# Patient Record
Sex: MALE | Race: WHITE | Employment: OTHER | URBAN - NONMETROPOLITAN AREA
[De-identification: names, ages, dates, MRNs, and addresses within clinical notes are randomized per-mention and may not be internally consistent; named-entity substitution may affect disease eponyms.]

---

## 2019-07-01 RX ORDER — MELOXICAM 15 MG/1
15 TABLET ORAL DAILY
Status: ON HOLD | COMMUNITY
End: 2019-07-11 | Stop reason: HOSPADM

## 2019-07-01 RX ORDER — TIZANIDINE 2 MG/1
2 TABLET ORAL 3 TIMES DAILY
COMMUNITY
End: 2020-01-22 | Stop reason: ALTCHOICE

## 2019-07-01 RX ORDER — PRAVASTATIN SODIUM 10 MG
10 TABLET ORAL NIGHTLY
COMMUNITY

## 2019-07-01 RX ORDER — GABAPENTIN 300 MG/1
300 CAPSULE ORAL 3 TIMES DAILY
COMMUNITY
End: 2020-01-22 | Stop reason: ALTCHOICE

## 2019-07-01 SDOH — HEALTH STABILITY: MENTAL HEALTH: HOW OFTEN DO YOU HAVE A DRINK CONTAINING ALCOHOL?: NEVER

## 2019-07-09 ENCOUNTER — APPOINTMENT (OUTPATIENT)
Dept: GENERAL RADIOLOGY | Age: 69
DRG: 467 | End: 2019-07-09
Attending: ORTHOPAEDIC SURGERY
Payer: MEDICARE

## 2019-07-09 ENCOUNTER — HOSPITAL ENCOUNTER (INPATIENT)
Age: 69
LOS: 3 days | Discharge: NURSING, REHAB OR LONG-TERM CARE | DRG: 467 | End: 2019-07-12
Attending: ORTHOPAEDIC SURGERY | Admitting: ORTHOPAEDIC SURGERY
Payer: MEDICARE

## 2019-07-09 ENCOUNTER — ANESTHESIA (OUTPATIENT)
Dept: OPERATING ROOM | Age: 69
DRG: 467 | End: 2019-07-09
Payer: MEDICARE

## 2019-07-09 ENCOUNTER — ANESTHESIA EVENT (OUTPATIENT)
Dept: OPERATING ROOM | Age: 69
DRG: 467 | End: 2019-07-09
Payer: MEDICARE

## 2019-07-09 VITALS — SYSTOLIC BLOOD PRESSURE: 103 MMHG | DIASTOLIC BLOOD PRESSURE: 58 MMHG | TEMPERATURE: 97.7 F | OXYGEN SATURATION: 94 %

## 2019-07-09 DIAGNOSIS — T84.7XXA INFECTED ORTHOPEDIC IMPLANT, INITIAL ENCOUNTER (HCC): Primary | ICD-10-CM

## 2019-07-09 LAB
ABO: NORMAL
ANTIBODY SCREEN: NORMAL
GLUCOSE BLD-MCNC: 148 MG/DL (ref 70–108)
GLUCOSE BLD-MCNC: 151 MG/DL (ref 70–108)
GLUCOSE BLD-MCNC: 205 MG/DL (ref 70–108)
RH FACTOR: NORMAL

## 2019-07-09 PROCEDURE — 6360000002 HC RX W HCPCS: Performed by: PHYSICIAN ASSISTANT

## 2019-07-09 PROCEDURE — 2580000003 HC RX 258: Performed by: PHYSICIAN ASSISTANT

## 2019-07-09 PROCEDURE — 89060 EXAM SYNOVIAL FLUID CRYSTALS: CPT

## 2019-07-09 PROCEDURE — 3E1U38Z IRRIGATION OF JOINTS USING IRRIGATING SUBSTANCE, PERCUTANEOUS APPROACH: ICD-10-PCS | Performed by: ORTHOPAEDIC SURGERY

## 2019-07-09 PROCEDURE — 82948 REAGENT STRIP/BLOOD GLUCOSE: CPT

## 2019-07-09 PROCEDURE — 86900 BLOOD TYPING SEROLOGIC ABO: CPT

## 2019-07-09 PROCEDURE — 6370000000 HC RX 637 (ALT 250 FOR IP): Performed by: PHYSICIAN ASSISTANT

## 2019-07-09 PROCEDURE — L1851 KO SINGLE UPRIGHT PREFAB OTS: HCPCS | Performed by: ORTHOPAEDIC SURGERY

## 2019-07-09 PROCEDURE — 87158 CULTURE TYPING ADDED METHOD: CPT

## 2019-07-09 PROCEDURE — 2709999900 HC NON-CHARGEABLE SUPPLY: Performed by: ORTHOPAEDIC SURGERY

## 2019-07-09 PROCEDURE — 6360000002 HC RX W HCPCS: Performed by: NURSE ANESTHETIST, CERTIFIED REGISTERED

## 2019-07-09 PROCEDURE — 3700000001 HC ADD 15 MINUTES (ANESTHESIA): Performed by: ORTHOPAEDIC SURGERY

## 2019-07-09 PROCEDURE — 87116 MYCOBACTERIA CULTURE: CPT

## 2019-07-09 PROCEDURE — 3600000015 HC SURGERY LEVEL 5 ADDTL 15MIN: Performed by: ORTHOPAEDIC SURGERY

## 2019-07-09 PROCEDURE — 0SPD0JZ REMOVAL OF SYNTHETIC SUBSTITUTE FROM LEFT KNEE JOINT, OPEN APPROACH: ICD-10-PCS | Performed by: ORTHOPAEDIC SURGERY

## 2019-07-09 PROCEDURE — 0SRD0EZ REPLACEMENT OF LEFT KNEE JOINT WITH ARTICULATING SPACER, OPEN APPROACH: ICD-10-PCS | Performed by: ORTHOPAEDIC SURGERY

## 2019-07-09 PROCEDURE — 86901 BLOOD TYPING SEROLOGIC RH(D): CPT

## 2019-07-09 PROCEDURE — 2720000010 HC SURG SUPPLY STERILE: Performed by: ORTHOPAEDIC SURGERY

## 2019-07-09 PROCEDURE — 88112 CYTOPATH CELL ENHANCE TECH: CPT

## 2019-07-09 PROCEDURE — 3600000005 HC SURGERY LEVEL 5 BASE: Performed by: ORTHOPAEDIC SURGERY

## 2019-07-09 PROCEDURE — 7100000000 HC PACU RECOVERY - FIRST 15 MIN: Performed by: ORTHOPAEDIC SURGERY

## 2019-07-09 PROCEDURE — 3700000000 HC ANESTHESIA ATTENDED CARE: Performed by: ORTHOPAEDIC SURGERY

## 2019-07-09 PROCEDURE — 6360000002 HC RX W HCPCS: Performed by: ORTHOPAEDIC SURGERY

## 2019-07-09 PROCEDURE — 87205 SMEAR GRAM STAIN: CPT

## 2019-07-09 PROCEDURE — 6360000002 HC RX W HCPCS: Performed by: INTERNAL MEDICINE

## 2019-07-09 PROCEDURE — C1713 ANCHOR/SCREW BN/BN,TIS/BN: HCPCS | Performed by: ORTHOPAEDIC SURGERY

## 2019-07-09 PROCEDURE — 87070 CULTURE OTHR SPECIMN AEROBIC: CPT

## 2019-07-09 PROCEDURE — 6370000000 HC RX 637 (ALT 250 FOR IP): Performed by: ORTHOPAEDIC SURGERY

## 2019-07-09 PROCEDURE — L1810 KO ELASTIC WITH JOINTS: HCPCS | Performed by: ORTHOPAEDIC SURGERY

## 2019-07-09 PROCEDURE — 2500000003 HC RX 250 WO HCPCS: Performed by: ORTHOPAEDIC SURGERY

## 2019-07-09 PROCEDURE — 88305 TISSUE EXAM BY PATHOLOGIST: CPT

## 2019-07-09 PROCEDURE — 87102 FUNGUS ISOLATION CULTURE: CPT

## 2019-07-09 PROCEDURE — 2500000003 HC RX 250 WO HCPCS: Performed by: PHYSICIAN ASSISTANT

## 2019-07-09 PROCEDURE — 2580000003 HC RX 258: Performed by: ORTHOPAEDIC SURGERY

## 2019-07-09 PROCEDURE — 1200000000 HC SEMI PRIVATE

## 2019-07-09 PROCEDURE — 86850 RBC ANTIBODY SCREEN: CPT

## 2019-07-09 PROCEDURE — 87075 CULTR BACTERIA EXCEPT BLOOD: CPT

## 2019-07-09 PROCEDURE — 73560 X-RAY EXAM OF KNEE 1 OR 2: CPT

## 2019-07-09 PROCEDURE — 36415 COLL VENOUS BLD VENIPUNCTURE: CPT

## 2019-07-09 PROCEDURE — 7100000001 HC PACU RECOVERY - ADDTL 15 MIN: Performed by: ORTHOPAEDIC SURGERY

## 2019-07-09 DEVICE — CEMENT BNE 40GM W/ GENT HI VISC RADPQ FOR REV SURG: Type: IMPLANTABLE DEVICE | Site: KNEE | Status: FUNCTIONAL

## 2019-07-09 RX ORDER — MORPHINE SULFATE 2 MG/ML
2 INJECTION, SOLUTION INTRAMUSCULAR; INTRAVENOUS
Status: ACTIVE | OUTPATIENT
Start: 2019-07-09 | End: 2019-07-10

## 2019-07-09 RX ORDER — MIDAZOLAM HYDROCHLORIDE 1 MG/ML
INJECTION INTRAMUSCULAR; INTRAVENOUS PRN
Status: DISCONTINUED | OUTPATIENT
Start: 2019-07-09 | End: 2019-07-09 | Stop reason: SDUPTHER

## 2019-07-09 RX ORDER — PYRIDOSTIGMINE BROMIDE 60 MG/1
TABLET ORAL
COMMUNITY

## 2019-07-09 RX ORDER — DEXTROSE MONOHYDRATE 25 G/50ML
12.5 INJECTION, SOLUTION INTRAVENOUS PRN
Status: DISCONTINUED | OUTPATIENT
Start: 2019-07-09 | End: 2019-07-12 | Stop reason: HOSPADM

## 2019-07-09 RX ORDER — SODIUM CHLORIDE 9 MG/ML
INJECTION, SOLUTION INTRAVENOUS CONTINUOUS
Status: DISCONTINUED | OUTPATIENT
Start: 2019-07-09 | End: 2019-07-09

## 2019-07-09 RX ORDER — ACETAMINOPHEN 500 MG
1000 TABLET ORAL ONCE
Status: COMPLETED | OUTPATIENT
Start: 2019-07-09 | End: 2019-07-09

## 2019-07-09 RX ORDER — SODIUM CHLORIDE 0.9 % (FLUSH) 0.9 %
10 SYRINGE (ML) INJECTION EVERY 12 HOURS SCHEDULED
Status: DISCONTINUED | OUTPATIENT
Start: 2019-07-09 | End: 2019-07-09 | Stop reason: HOSPADM

## 2019-07-09 RX ORDER — GABAPENTIN 300 MG/1
300 CAPSULE ORAL 3 TIMES DAILY
Status: DISCONTINUED | OUTPATIENT
Start: 2019-07-09 | End: 2019-07-12 | Stop reason: HOSPADM

## 2019-07-09 RX ORDER — SODIUM CHLORIDE 0.9 % (FLUSH) 0.9 %
10 SYRINGE (ML) INJECTION PRN
Status: DISCONTINUED | OUTPATIENT
Start: 2019-07-09 | End: 2019-07-09 | Stop reason: HOSPADM

## 2019-07-09 RX ORDER — TRAMADOL HYDROCHLORIDE 50 MG/1
50 TABLET ORAL EVERY 6 HOURS PRN
Status: DISCONTINUED | OUTPATIENT
Start: 2019-07-09 | End: 2019-07-12 | Stop reason: HOSPADM

## 2019-07-09 RX ORDER — NICOTINE POLACRILEX 4 MG
15 LOZENGE BUCCAL PRN
Status: DISCONTINUED | OUTPATIENT
Start: 2019-07-09 | End: 2019-07-12 | Stop reason: HOSPADM

## 2019-07-09 RX ORDER — ONDANSETRON 2 MG/ML
4 INJECTION INTRAMUSCULAR; INTRAVENOUS EVERY 6 HOURS PRN
Status: DISCONTINUED | OUTPATIENT
Start: 2019-07-09 | End: 2019-07-12 | Stop reason: HOSPADM

## 2019-07-09 RX ORDER — FENTANYL CITRATE 50 UG/ML
INJECTION, SOLUTION INTRAMUSCULAR; INTRAVENOUS PRN
Status: DISCONTINUED | OUTPATIENT
Start: 2019-07-09 | End: 2019-07-09 | Stop reason: SDUPTHER

## 2019-07-09 RX ORDER — DEXTROSE MONOHYDRATE 50 MG/ML
100 INJECTION, SOLUTION INTRAVENOUS PRN
Status: DISCONTINUED | OUTPATIENT
Start: 2019-07-09 | End: 2019-07-12 | Stop reason: HOSPADM

## 2019-07-09 RX ORDER — TIZANIDINE 4 MG/1
2 TABLET ORAL 3 TIMES DAILY
Status: DISCONTINUED | OUTPATIENT
Start: 2019-07-09 | End: 2019-07-12 | Stop reason: HOSPADM

## 2019-07-09 RX ORDER — GABAPENTIN 300 MG/1
300 CAPSULE ORAL ONCE
Status: DISCONTINUED | OUTPATIENT
Start: 2019-07-09 | End: 2019-07-09 | Stop reason: HOSPADM

## 2019-07-09 RX ORDER — SODIUM CHLORIDE 0.9 % (FLUSH) 0.9 %
10 SYRINGE (ML) INJECTION EVERY 12 HOURS SCHEDULED
Status: DISCONTINUED | OUTPATIENT
Start: 2019-07-09 | End: 2019-07-12 | Stop reason: HOSPADM

## 2019-07-09 RX ORDER — PROMETHAZINE HYDROCHLORIDE 25 MG/ML
12.5 INJECTION, SOLUTION INTRAMUSCULAR; INTRAVENOUS
Status: DISCONTINUED | OUTPATIENT
Start: 2019-07-09 | End: 2019-07-09 | Stop reason: HOSPADM

## 2019-07-09 RX ORDER — SODIUM CHLORIDE 0.9 % (FLUSH) 0.9 %
10 SYRINGE (ML) INJECTION PRN
Status: DISCONTINUED | OUTPATIENT
Start: 2019-07-09 | End: 2019-07-12 | Stop reason: HOSPADM

## 2019-07-09 RX ORDER — LABETALOL 20 MG/4 ML (5 MG/ML) INTRAVENOUS SYRINGE
10 EVERY 10 MIN PRN
Status: DISCONTINUED | OUTPATIENT
Start: 2019-07-09 | End: 2019-07-09 | Stop reason: HOSPADM

## 2019-07-09 RX ORDER — DEXAMETHASONE SODIUM PHOSPHATE 4 MG/ML
8 INJECTION, SOLUTION INTRA-ARTICULAR; INTRALESIONAL; INTRAMUSCULAR; INTRAVENOUS; SOFT TISSUE ONCE
Status: COMPLETED | OUTPATIENT
Start: 2019-07-09 | End: 2019-07-09

## 2019-07-09 RX ORDER — DOCUSATE SODIUM 100 MG/1
100 CAPSULE, LIQUID FILLED ORAL 2 TIMES DAILY
Status: DISCONTINUED | OUTPATIENT
Start: 2019-07-09 | End: 2019-07-12 | Stop reason: HOSPADM

## 2019-07-09 RX ORDER — MORPHINE SULFATE 2 MG/ML
1 INJECTION, SOLUTION INTRAMUSCULAR; INTRAVENOUS EVERY 5 MIN PRN
Status: DISCONTINUED | OUTPATIENT
Start: 2019-07-09 | End: 2019-07-09 | Stop reason: HOSPADM

## 2019-07-09 RX ORDER — VANCOMYCIN HYDROCHLORIDE 1 G/20ML
INJECTION, POWDER, LYOPHILIZED, FOR SOLUTION INTRAVENOUS PRN
Status: DISCONTINUED | OUTPATIENT
Start: 2019-07-09 | End: 2019-07-09 | Stop reason: ALTCHOICE

## 2019-07-09 RX ORDER — FAMOTIDINE 20 MG/1
20 TABLET, FILM COATED ORAL 2 TIMES DAILY
Status: DISCONTINUED | OUTPATIENT
Start: 2019-07-09 | End: 2019-07-12 | Stop reason: HOSPADM

## 2019-07-09 RX ORDER — SODIUM CHLORIDE 9 MG/ML
INJECTION, SOLUTION INTRAVENOUS CONTINUOUS
Status: DISCONTINUED | OUTPATIENT
Start: 2019-07-09 | End: 2019-07-12 | Stop reason: HOSPADM

## 2019-07-09 RX ORDER — PRAVASTATIN SODIUM 10 MG
10 TABLET ORAL NIGHTLY
Status: DISCONTINUED | OUTPATIENT
Start: 2019-07-09 | End: 2019-07-12 | Stop reason: HOSPADM

## 2019-07-09 RX ORDER — PYRIDOSTIGMINE BROMIDE 60 MG/1
30 TABLET ORAL PRN
Status: DISCONTINUED | OUTPATIENT
Start: 2019-07-09 | End: 2019-07-09

## 2019-07-09 RX ORDER — PYRIDOSTIGMINE BROMIDE 60 MG/1
60 TABLET ORAL 3 TIMES DAILY PRN
Status: DISCONTINUED | OUTPATIENT
Start: 2019-07-09 | End: 2019-07-12 | Stop reason: HOSPADM

## 2019-07-09 RX ORDER — HYDROCODONE BITARTRATE AND ACETAMINOPHEN 5; 325 MG/1; MG/1
1 TABLET ORAL EVERY 4 HOURS PRN
Status: DISCONTINUED | OUTPATIENT
Start: 2019-07-09 | End: 2019-07-12 | Stop reason: HOSPADM

## 2019-07-09 RX ORDER — CELECOXIB 100 MG/1
100 CAPSULE ORAL ONCE
Status: COMPLETED | OUTPATIENT
Start: 2019-07-09 | End: 2019-07-09

## 2019-07-09 RX ORDER — FENTANYL CITRATE 50 UG/ML
50 INJECTION, SOLUTION INTRAMUSCULAR; INTRAVENOUS EVERY 5 MIN PRN
Status: DISCONTINUED | OUTPATIENT
Start: 2019-07-09 | End: 2019-07-09 | Stop reason: HOSPADM

## 2019-07-09 RX ORDER — BUPIVACAINE HYDROCHLORIDE 5 MG/ML
INJECTION, SOLUTION PERINEURAL PRN
Status: DISCONTINUED | OUTPATIENT
Start: 2019-07-09 | End: 2019-07-09 | Stop reason: ALTCHOICE

## 2019-07-09 RX ORDER — TRANEXAMIC ACID 100 MG/ML
INJECTION, SOLUTION INTRAVENOUS PRN
Status: DISCONTINUED | OUTPATIENT
Start: 2019-07-09 | End: 2019-07-09 | Stop reason: ALTCHOICE

## 2019-07-09 RX ORDER — PYRIDOSTIGMINE BROMIDE 60 MG/1
180 TABLET ORAL EVERY MORNING
Status: DISCONTINUED | OUTPATIENT
Start: 2019-07-10 | End: 2019-07-12 | Stop reason: HOSPADM

## 2019-07-09 RX ORDER — PROPOFOL 10 MG/ML
INJECTION, EMULSION INTRAVENOUS CONTINUOUS PRN
Status: DISCONTINUED | OUTPATIENT
Start: 2019-07-09 | End: 2019-07-09 | Stop reason: SDUPTHER

## 2019-07-09 RX ORDER — PROPOFOL 10 MG/ML
INJECTION, EMULSION INTRAVENOUS PRN
Status: DISCONTINUED | OUTPATIENT
Start: 2019-07-09 | End: 2019-07-09 | Stop reason: SDUPTHER

## 2019-07-09 RX ADMIN — INSULIN LISPRO 2 UNITS: 100 INJECTION, SOLUTION INTRAVENOUS; SUBCUTANEOUS at 16:39

## 2019-07-09 RX ADMIN — MIDAZOLAM HYDROCHLORIDE 2 MG: 1 INJECTION, SOLUTION INTRAMUSCULAR; INTRAVENOUS at 11:35

## 2019-07-09 RX ADMIN — TRANEXAMIC ACID 1 G: 1 INJECTION, SOLUTION INTRAVENOUS at 12:00

## 2019-07-09 RX ADMIN — SODIUM CHLORIDE: 9 INJECTION, SOLUTION INTRAVENOUS at 10:34

## 2019-07-09 RX ADMIN — GABAPENTIN 300 MG: 300 CAPSULE ORAL at 21:30

## 2019-07-09 RX ADMIN — FENTANYL CITRATE 50 MCG: 50 INJECTION INTRAMUSCULAR; INTRAVENOUS at 11:43

## 2019-07-09 RX ADMIN — PROPOFOL 40 MG: 10 INJECTION, EMULSION INTRAVENOUS at 11:57

## 2019-07-09 RX ADMIN — INSULIN LISPRO 2 UNITS: 100 INJECTION, SOLUTION INTRAVENOUS; SUBCUTANEOUS at 21:34

## 2019-07-09 RX ADMIN — PROPOFOL 40 MG: 10 INJECTION, EMULSION INTRAVENOUS at 12:06

## 2019-07-09 RX ADMIN — RIVAROXABAN 10 MG: 10 TABLET, FILM COATED ORAL at 21:30

## 2019-07-09 RX ADMIN — Medication 10 ML: at 21:31

## 2019-07-09 RX ADMIN — ONDANSETRON 4 MG: 2 INJECTION INTRAMUSCULAR; INTRAVENOUS at 16:32

## 2019-07-09 RX ADMIN — PROPOFOL 125 MCG/KG/MIN: 10 INJECTION, EMULSION INTRAVENOUS at 11:57

## 2019-07-09 RX ADMIN — SODIUM CHLORIDE: 9 INJECTION, SOLUTION INTRAVENOUS at 15:13

## 2019-07-09 RX ADMIN — Medication 3 G: at 21:12

## 2019-07-09 RX ADMIN — DOCUSATE SODIUM 100 MG: 100 CAPSULE, LIQUID FILLED ORAL at 21:33

## 2019-07-09 RX ADMIN — FENTANYL CITRATE 50 MCG: 50 INJECTION INTRAMUSCULAR; INTRAVENOUS at 11:35

## 2019-07-09 RX ADMIN — PRAVASTATIN SODIUM 10 MG: 10 TABLET ORAL at 21:30

## 2019-07-09 RX ADMIN — FAMOTIDINE 20 MG: 20 TABLET ORAL at 21:33

## 2019-07-09 RX ADMIN — ACETAMINOPHEN 1000 MG: 500 TABLET ORAL at 10:52

## 2019-07-09 RX ADMIN — DEXAMETHASONE SODIUM PHOSPHATE 8 MG: 4 INJECTION, SOLUTION INTRA-ARTICULAR; INTRALESIONAL; INTRAMUSCULAR; INTRAVENOUS; SOFT TISSUE at 10:53

## 2019-07-09 RX ADMIN — CELECOXIB 100 MG: 100 CAPSULE ORAL at 10:53

## 2019-07-09 RX ADMIN — Medication 2 G: at 12:00

## 2019-07-09 RX ADMIN — TIZANIDINE 2 MG: 4 TABLET ORAL at 21:28

## 2019-07-09 ASSESSMENT — PULMONARY FUNCTION TESTS
PIF_VALUE: 0

## 2019-07-09 ASSESSMENT — PAIN SCALES - GENERAL
PAINLEVEL_OUTOF10: 8
PAINLEVEL_OUTOF10: 8
PAINLEVEL_OUTOF10: 0
PAINLEVEL_OUTOF10: 8
PAINLEVEL_OUTOF10: 0

## 2019-07-09 ASSESSMENT — PAIN DESCRIPTION - DESCRIPTORS: DESCRIPTORS: ACHING

## 2019-07-09 ASSESSMENT — PAIN - FUNCTIONAL ASSESSMENT: PAIN_FUNCTIONAL_ASSESSMENT: 0-10

## 2019-07-09 NOTE — H&P
[address] [address]  History and Physical Update    Pt Name: Katlin Edgar  MRN: [id number]  YOB: 1950  Date of evaluation: 7/9/2019    I have examined the patient and reviewed the H&P/Consult and there are no changes to the patient or plans.       Samantha Rubio  Electronically signed 7/9/2019 at 11:36 AM
06/24/2019 11:37:07     [email or web address]  Job#: [id number]     Doc#: [id number]    CC:

## 2019-07-10 LAB
ANION GAP SERPL CALCULATED.3IONS-SCNC: 12 MEQ/L (ref 8–16)
BUN BLDV-MCNC: 24 MG/DL (ref 7–22)
C-REACTIVE PROTEIN: 2.04 MG/DL (ref 0–1)
CALCIUM SERPL-MCNC: 8.8 MG/DL (ref 8.5–10.5)
CHLORIDE BLD-SCNC: 103 MEQ/L (ref 98–111)
CO2: 22 MEQ/L (ref 23–33)
CREAT SERPL-MCNC: 1 MG/DL (ref 0.4–1.2)
CRYSTALS, FLUID: NORMAL
ERYTHROCYTE [DISTWIDTH] IN BLOOD BY AUTOMATED COUNT: 14.6 % (ref 11.5–14.5)
ERYTHROCYTE [DISTWIDTH] IN BLOOD BY AUTOMATED COUNT: 45.1 FL (ref 35–45)
GFR SERPL CREATININE-BSD FRML MDRD: 74 ML/MIN/1.73M2
GLUCOSE BLD-MCNC: 290 MG/DL (ref 70–108)
HCT VFR BLD CALC: 36.1 % (ref 42–52)
HEMOGLOBIN: 11.9 GM/DL (ref 14–18)
MCH RBC QN AUTO: 27.9 PG (ref 26–33)
MCHC RBC AUTO-ENTMCNC: 33 GM/DL (ref 32.2–35.5)
MCV RBC AUTO: 84.7 FL (ref 80–94)
PLATELET # BLD: 182 THOU/MM3 (ref 130–400)
PMV BLD AUTO: 10.2 FL (ref 9.4–12.4)
POTASSIUM SERPL-SCNC: 4.7 MEQ/L (ref 3.5–5.2)
RBC # BLD: 4.26 MILL/MM3 (ref 4.7–6.1)
SEDIMENTATION RATE, ERYTHROCYTE: 23 MM/HR (ref 0–10)
SODIUM BLD-SCNC: 137 MEQ/L (ref 135–145)
WBC # BLD: 10.4 THOU/MM3 (ref 4.8–10.8)

## 2019-07-10 PROCEDURE — 97166 OT EVAL MOD COMPLEX 45 MIN: CPT

## 2019-07-10 PROCEDURE — 2580000003 HC RX 258: Performed by: ORTHOPAEDIC SURGERY

## 2019-07-10 PROCEDURE — 2580000003 HC RX 258: Performed by: INTERNAL MEDICINE

## 2019-07-10 PROCEDURE — 6370000000 HC RX 637 (ALT 250 FOR IP): Performed by: ORTHOPAEDIC SURGERY

## 2019-07-10 PROCEDURE — 97530 THERAPEUTIC ACTIVITIES: CPT

## 2019-07-10 PROCEDURE — 85027 COMPLETE CBC AUTOMATED: CPT

## 2019-07-10 PROCEDURE — 36415 COLL VENOUS BLD VENIPUNCTURE: CPT

## 2019-07-10 PROCEDURE — 51798 US URINE CAPACITY MEASURE: CPT

## 2019-07-10 PROCEDURE — 86140 C-REACTIVE PROTEIN: CPT

## 2019-07-10 PROCEDURE — 85651 RBC SED RATE NONAUTOMATED: CPT

## 2019-07-10 PROCEDURE — 1200000000 HC SEMI PRIVATE

## 2019-07-10 PROCEDURE — 97163 PT EVAL HIGH COMPLEX 45 MIN: CPT

## 2019-07-10 PROCEDURE — 97110 THERAPEUTIC EXERCISES: CPT

## 2019-07-10 PROCEDURE — 97116 GAIT TRAINING THERAPY: CPT

## 2019-07-10 PROCEDURE — 80048 BASIC METABOLIC PNL TOTAL CA: CPT

## 2019-07-10 PROCEDURE — 97535 SELF CARE MNGMENT TRAINING: CPT

## 2019-07-10 PROCEDURE — 6360000002 HC RX W HCPCS: Performed by: INTERNAL MEDICINE

## 2019-07-10 RX ORDER — ACETAMINOPHEN 325 MG/1
650 TABLET ORAL EVERY 4 HOURS PRN
Status: DISCONTINUED | OUTPATIENT
Start: 2019-07-10 | End: 2019-07-12 | Stop reason: HOSPADM

## 2019-07-10 RX ADMIN — FAMOTIDINE 20 MG: 20 TABLET ORAL at 21:11

## 2019-07-10 RX ADMIN — GABAPENTIN 300 MG: 300 CAPSULE ORAL at 15:01

## 2019-07-10 RX ADMIN — Medication 3 G: at 12:01

## 2019-07-10 RX ADMIN — Medication 10 ML: at 21:13

## 2019-07-10 RX ADMIN — INSULIN LISPRO 6 UNITS: 100 INJECTION, SOLUTION INTRAVENOUS; SUBCUTANEOUS at 09:12

## 2019-07-10 RX ADMIN — INSULIN LISPRO 4 UNITS: 100 INJECTION, SOLUTION INTRAVENOUS; SUBCUTANEOUS at 21:15

## 2019-07-10 RX ADMIN — ACETAMINOPHEN 650 MG: 325 TABLET ORAL at 20:28

## 2019-07-10 RX ADMIN — TIZANIDINE 2 MG: 4 TABLET ORAL at 15:01

## 2019-07-10 RX ADMIN — ACETAMINOPHEN 650 MG: 325 TABLET ORAL at 13:48

## 2019-07-10 RX ADMIN — DOCUSATE SODIUM 100 MG: 100 CAPSULE, LIQUID FILLED ORAL at 21:11

## 2019-07-10 RX ADMIN — PYRIDOSTIGMINE BROMIDE 180 MG: 60 TABLET ORAL at 09:13

## 2019-07-10 RX ADMIN — INSULIN HUMAN 160 UNITS: 100 INJECTION, SUSPENSION SUBCUTANEOUS at 09:12

## 2019-07-10 RX ADMIN — TIZANIDINE 2 MG: 4 TABLET ORAL at 21:11

## 2019-07-10 RX ADMIN — PRAVASTATIN SODIUM 10 MG: 10 TABLET ORAL at 21:11

## 2019-07-10 RX ADMIN — DOCUSATE SODIUM 100 MG: 100 CAPSULE, LIQUID FILLED ORAL at 09:13

## 2019-07-10 RX ADMIN — METFORMIN HYDROCHLORIDE 500 MG: 500 TABLET ORAL at 09:13

## 2019-07-10 RX ADMIN — HYDROCODONE BITARTRATE AND ACETAMINOPHEN 1 TABLET: 5; 325 TABLET ORAL at 21:24

## 2019-07-10 RX ADMIN — Medication 3 G: at 21:08

## 2019-07-10 RX ADMIN — FAMOTIDINE 20 MG: 20 TABLET ORAL at 09:13

## 2019-07-10 RX ADMIN — TIZANIDINE 2 MG: 4 TABLET ORAL at 09:13

## 2019-07-10 RX ADMIN — RIVAROXABAN 10 MG: 10 TABLET, FILM COATED ORAL at 20:26

## 2019-07-10 RX ADMIN — GABAPENTIN 300 MG: 300 CAPSULE ORAL at 21:11

## 2019-07-10 RX ADMIN — GABAPENTIN 300 MG: 300 CAPSULE ORAL at 09:13

## 2019-07-10 RX ADMIN — Medication 3 G: at 04:30

## 2019-07-10 ASSESSMENT — PAIN DESCRIPTION - LOCATION
LOCATION: KNEE

## 2019-07-10 ASSESSMENT — PAIN - FUNCTIONAL ASSESSMENT
PAIN_FUNCTIONAL_ASSESSMENT: PREVENTS OR INTERFERES SOME ACTIVE ACTIVITIES AND ADLS
PAIN_FUNCTIONAL_ASSESSMENT: PREVENTS OR INTERFERES SOME ACTIVE ACTIVITIES AND ADLS

## 2019-07-10 ASSESSMENT — PAIN DESCRIPTION - PAIN TYPE
TYPE: SURGICAL PAIN

## 2019-07-10 ASSESSMENT — PAIN SCALES - GENERAL
PAINLEVEL_OUTOF10: 2
PAINLEVEL_OUTOF10: 3
PAINLEVEL_OUTOF10: 2
PAINLEVEL_OUTOF10: 0
PAINLEVEL_OUTOF10: 0
PAINLEVEL_OUTOF10: 1

## 2019-07-10 ASSESSMENT — PAIN DESCRIPTION - DESCRIPTORS
DESCRIPTORS: DULL
DESCRIPTORS: ACHING

## 2019-07-10 ASSESSMENT — PAIN DESCRIPTION - PROGRESSION
CLINICAL_PROGRESSION: GRADUALLY IMPROVING
CLINICAL_PROGRESSION: GRADUALLY IMPROVING

## 2019-07-10 ASSESSMENT — PAIN DESCRIPTION - ONSET
ONSET: GRADUAL
ONSET: GRADUAL

## 2019-07-10 ASSESSMENT — PAIN DESCRIPTION - ORIENTATION
ORIENTATION: LEFT

## 2019-07-10 ASSESSMENT — PAIN DESCRIPTION - FREQUENCY
FREQUENCY: INTERMITTENT
FREQUENCY: INTERMITTENT

## 2019-07-10 NOTE — PROGRESS NOTES
[address] [address]  INPATIENT PHYSICAL THERAPY  EVALUATION  [facility] ORTHOPEDICS 7K - 7K-28/028-A    Time In: 8694  Time Out: 9856  Timed Code Treatment Minutes: 45 Minutes  Minutes: 48          Date: 7/10/2019  Patient Name: Nikki South,  Gender:  male        MRN: [id number]  : 1950  (77 y.o.)      Referring Practitioner: Dr Briseida Lemons  Diagnosis: Infected orthopedic implant  Additional Pertinent Hx: Pt admitted with infected TKR so came in for sx 7-9 for removal of hardware and I and D of left knee and placement of antibiotic spacer. H/O mysthenia gravis, DM, 6 back sx's. Past Medical History:   Diagnosis Date    Arthritis     Diabetes mellitus ([facility]     History of blood transfusion     History of thymectomy     Hyperlipidemia     Myasthenia gravis ([facility]     Thyroid disease      Past Surgical History:   Procedure Laterality Date    APPENDECTOMY      BACK SURGERY      x6    CARPAL TUNNEL RELEASE      CHOLECYSTECTOMY      JOINT REPLACEMENT Left     hip and knee    THYMECTOMY      THYROIDECTOMY, PARTIAL         Restrictions/Precautions:  Weight Bearing, Surgical Protocols, Up as Tolerated, Fall Risk           Left Lower Extremity Weight Bearing: Weight Bearing As Tolerated        Other position/activity restrictions: hiinged brace on when ambulating( to continue with this )-confirming with ortho if allowed to have off when in bed, WBAT left  Left Lower Extremity Brace: Knee Brace  LLE Brace Type: hinged knee brace 0-90 with ambulation for sure     Subjective:  Chart Reviewed: Yes  Patient assessed for rehabilitation services?: Yes  Family / Caregiver Present: No  Subjective: Pt in bed motivatedf for therapy and wanting to get up and move in the room and then stay up in chair. Pt had brace on left LE. This was readjusted by the therapist prior to getting OOB.      General:  Overall Orientation Status: Within Normal Limits  Follows Commands: Within Functional Limits    Vision: Within

## 2019-07-10 NOTE — PROGRESS NOTES
Progress Note    Subjective:     Post-Operative Day: 1 Status Post left 1st stage revision Knee with abx spacer   Systemic or Specific Complaints:NO pain. Dysuria requiring gaffney catheter   Culture - no growth     Objective:   VITALS:  /70   Pulse 66   Temp 97.9 °F (36.6 °C) (Oral)   Resp 16   Ht 6' 3\" (1.905 m)   Wt 265 lb (120.2 kg)   SpO2 95%   BMI 33.12 kg/m²        General: alert, appears stated age and cooperative   Wound: No Drainage   DVT Exam: Negative Amada's sign. Knee swollen but thigh soft to palpation. Moving foot and ankle. Good distal pulses. Data Review  CBC:   Lab Results   Component Value Date    WBC 10.4 07/10/2019    RBC 4.26 07/10/2019    HGB 11.9 07/10/2019    HCT 36.1 07/10/2019     07/10/2019     BMP: No results found for: NA, K, CL, CO2, BUN, CREATININE, GLUCOSE    Assessment:     Status Post left 1st stage revision knee. Doing well postoperatively. Plan:      1:  Continue Total Knee post-op protocol.    2:  Continue Deep venous thrombosis prophylaxis  3:  Continue physical therapy  4:  Continue Pain Control  5:  Continue abx per infectious dz

## 2019-07-10 NOTE — PROGRESS NOTES
[address]  INPATIENT OCCUPATIONAL THERAPY  [facility] ORTHOPEDICS 7K  EVALUATION    Time:   Time In: 4682  Time Out: 1450  Timed Code Treatment Minutes: 35 Minutes  Minutes: 43          Date: 7/10/2019  Patient Name: Sunita Madera,   Gender: male      MRN: [id number]  : 1950  (77 y.o.)  Referring Practitioner: Dr. Dominick Badillo  Diagnosis: infected orthopedic implant  Additional Pertinent Hx: Pt admitted with infected TKR so came in for sx 7-9 for removal of hardware and I and D of left knee and placement of antibiotic spacer. H/O mysthenia gravis, DM, 6 back sx's.      Restrictions/Precautions:  Restrictions/Precautions: Weight Bearing, Surgical Protocols, Up as Tolerated, Fall Risk  Left Lower Extremity Weight Bearing: Weight Bearing As Tolerated  Required Braces or Orthoses  Left Lower Extremity Brace: Knee Brace  LLE Brace Type: hinged knee brace 0-90 with ambulation for sure   Position Activity Restriction  Other position/activity restrictions: hiinged brace on when ambulating( to continue with this )-confirming with ortho if allowed to have off when in bed, WBAT left    Subjective  Chart Reviewed: Yes, Operative Notes, Orders, Progress Notes, History and Physical  Patient assessed for rehabilitation services?: Yes  Family / Caregiver Present: Yes    Subjective: Pleasant, cooperative, talkative  Comments: RN ok'd OT    Pain:  Pain Assessment  Patient Currently in Pain: Yes  Pain Assessment: 0-10  Pain Level: 2  Pain Type: Surgical pain  Pain Location: Knee  Pain Orientation: Left    Social/Functional History:  Lives With: Spouse  Type of Home: House  Home Layout: One level, Work area in basement  Home Access: Stairs to enter with rails(3ste)  Entrance Stairs - Number of Steps: has platform steps that are 3 inches aas well as another entrance has 3 standard steps   Home Equipment: 4 wheeled walker, Standard walker, Cane, Long-handled shoehorn, Electric scooter   Bathroom Shower/Tub: Walk-in shower  Bathroom Toilet: Handicap height  Bathroom Accessibility: Accessible       ADL Assistance: Independent(on bad days pt's wife will assist with socks and shoes)  Homemaking Assistance: Independent(pt does exterior, 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)  Ambulation Assistance: Independent(with cane)  Transfer Assistance: Independent    Active : Yes  Occupation: Retired  Additional Comments: Pt uses scooter to [employer], was using cane prior to admit    Cognition/Orientation:  Overall Orientation Status: Within Functional Limits  Overall Cognitive Status: WFL    ADL;s:  LE Dressing: Maximum assistance(donning hinged knee brace)  Toileting: (gaffney)       Functional Mobility:  Bed mobility  Supine to Sit: Minimal assistance(at LLE, HOB raised)  Sit to Supine: Minimal assistance(at LLE)  Scooting: Contact guard assistance(to EOB)    Functional Mobility  Functional - Mobility Device: Standard Walker  Activity: To/from bathroom  Assist Level: Contact guard assistance  Functional Mobility Comments: min unsteady, no significant LOB     Balance:  Balance  Sitting Balance: Stand by assistance(sitting EOB)  Standing Balance: Contact guard assistance  Standing Balance  Time: 45 seconds  Activity: prep for mobility    Transfers:  Sit to stand: Contact guard assistance  Stand to sit: Contact guard assistance  Transfer Comments: to/from EOB  Toilet Transfers  Toilet - Technique: Ambulating  Equipment Used: Extra wide bedside commode  Toilet Transfer: Contact guard assistance  Toilet Transfers Comments: cues for safety    Upper Extremity Assessment:   LUE AROM : WFL  RUE AROM : WFL    LUE Strength  Gross LUE Strength: WFL  RUE Strength  Gross RUE Strength: WFL            Activity Tolerance: Patient Tolerated treatment well       Assessment:  Assessment: Pt would continue to benefit from skilled OT intervention to maximize pt safety and independence with performing self care tasks and functional mobility following L knee surgery and to

## 2019-07-10 NOTE — PROGRESS NOTES
HYDROcodone 5 mg - acetaminophen, glucose, dextrose, glucagon (rDNA), dextrose, traMADol, pyridostigmine      LABS:     CBC:   Recent Labs     07/10/19  0635   WBC 10.4   HGB 11.9*        BMP:    Recent Labs     07/10/19  0635      K 4.7      CO2 22*   BUN 24*   CREATININE 1.0   GLUCOSE 290*     Calcium:  Recent Labs     07/10/19  0635   CALCIUM 8.8     Ionized Calcium:No results for input(s): IONCA in the last 72 hours. Magnesium:No results for input(s): MG in the last 72 hours. Phosphorus:No results for input(s): PHOS in the last 72 hours. BNP:No results for input(s): BNP in the last 72 hours. Glucose:  Recent Labs     07/09/19  1039 07/09/19  1352 07/09/19  1552   POCGLU 148* 151* 205*           Problem list of patient:     Patient Active Problem List   Diagnosis Code    In[facility] T84. 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

## 2019-07-10 NOTE — PROCEDURES
A Bladder scan was performed at 0520 . The patient's last void was at 8 hours ago . The residual amount was measured to be 590 ML. Report of results was given to [facility].

## 2019-07-11 PROCEDURE — 97530 THERAPEUTIC ACTIVITIES: CPT

## 2019-07-11 PROCEDURE — 2580000003 HC RX 258: Performed by: INTERNAL MEDICINE

## 2019-07-11 PROCEDURE — 97116 GAIT TRAINING THERAPY: CPT

## 2019-07-11 PROCEDURE — 1200000000 HC SEMI PRIVATE

## 2019-07-11 PROCEDURE — 6360000002 HC RX W HCPCS: Performed by: INTERNAL MEDICINE

## 2019-07-11 PROCEDURE — 97110 THERAPEUTIC EXERCISES: CPT

## 2019-07-11 PROCEDURE — 6370000000 HC RX 637 (ALT 250 FOR IP): Performed by: ORTHOPAEDIC SURGERY

## 2019-07-11 PROCEDURE — 2580000003 HC RX 258: Performed by: ORTHOPAEDIC SURGERY

## 2019-07-11 RX ORDER — CEFTRIAXONE 500 MG/1
2 INJECTION, POWDER, FOR SOLUTION INTRAMUSCULAR; INTRAVENOUS EVERY 24 HOURS
Qty: 70000 MG | Refills: 0 | Status: SHIPPED | OUTPATIENT
Start: 2019-07-11 | End: 2019-07-12 | Stop reason: HOSPADM

## 2019-07-11 RX ORDER — HYDROCODONE BITARTRATE AND ACETAMINOPHEN 5; 325 MG/1; MG/1
1 TABLET ORAL EVERY 4 HOURS PRN
Qty: 42 TABLET | Refills: 0 | Status: SHIPPED | OUTPATIENT
Start: 2019-07-11 | End: 2019-07-18

## 2019-07-11 RX ADMIN — INSULIN LISPRO 2 UNITS: 100 INJECTION, SOLUTION INTRAVENOUS; SUBCUTANEOUS at 09:26

## 2019-07-11 RX ADMIN — METFORMIN HYDROCHLORIDE 500 MG: 500 TABLET ORAL at 09:22

## 2019-07-11 RX ADMIN — INSULIN HUMAN 160 UNITS: 100 INJECTION, SUSPENSION SUBCUTANEOUS at 09:27

## 2019-07-11 RX ADMIN — FAMOTIDINE 20 MG: 20 TABLET ORAL at 09:31

## 2019-07-11 RX ADMIN — RIVAROXABAN 10 MG: 10 TABLET, FILM COATED ORAL at 17:02

## 2019-07-11 RX ADMIN — HYDROCODONE BITARTRATE AND ACETAMINOPHEN 1 TABLET: 5; 325 TABLET ORAL at 15:34

## 2019-07-11 RX ADMIN — DOCUSATE SODIUM 100 MG: 100 CAPSULE, LIQUID FILLED ORAL at 21:03

## 2019-07-11 RX ADMIN — FAMOTIDINE 20 MG: 20 TABLET ORAL at 21:04

## 2019-07-11 RX ADMIN — TIZANIDINE 2 MG: 4 TABLET ORAL at 15:27

## 2019-07-11 RX ADMIN — DOCUSATE SODIUM 100 MG: 100 CAPSULE, LIQUID FILLED ORAL at 09:31

## 2019-07-11 RX ADMIN — HYDROCODONE BITARTRATE AND ACETAMINOPHEN 1 TABLET: 5; 325 TABLET ORAL at 07:41

## 2019-07-11 RX ADMIN — ACETAMINOPHEN 650 MG: 325 TABLET ORAL at 18:52

## 2019-07-11 RX ADMIN — Medication 3 G: at 04:30

## 2019-07-11 RX ADMIN — INSULIN LISPRO 2 UNITS: 100 INJECTION, SOLUTION INTRAVENOUS; SUBCUTANEOUS at 17:00

## 2019-07-11 RX ADMIN — GABAPENTIN 300 MG: 300 CAPSULE ORAL at 21:04

## 2019-07-11 RX ADMIN — GABAPENTIN 300 MG: 300 CAPSULE ORAL at 09:23

## 2019-07-11 RX ADMIN — Medication 3 G: at 21:00

## 2019-07-11 RX ADMIN — PRAVASTATIN SODIUM 10 MG: 10 TABLET ORAL at 21:03

## 2019-07-11 RX ADMIN — INSULIN LISPRO 6 UNITS: 100 INJECTION, SOLUTION INTRAVENOUS; SUBCUTANEOUS at 12:10

## 2019-07-11 RX ADMIN — HYDROCODONE BITARTRATE AND ACETAMINOPHEN 1 TABLET: 5; 325 TABLET ORAL at 19:30

## 2019-07-11 RX ADMIN — TIZANIDINE 2 MG: 4 TABLET ORAL at 09:23

## 2019-07-11 RX ADMIN — Medication 10 ML: at 21:00

## 2019-07-11 RX ADMIN — GABAPENTIN 300 MG: 300 CAPSULE ORAL at 15:27

## 2019-07-11 RX ADMIN — Medication 3 G: at 12:10

## 2019-07-11 RX ADMIN — TIZANIDINE 2 MG: 4 TABLET ORAL at 21:03

## 2019-07-11 RX ADMIN — Medication 10 ML: at 09:32

## 2019-07-11 RX ADMIN — PYRIDOSTIGMINE BROMIDE 180 MG: 60 TABLET ORAL at 09:23

## 2019-07-11 RX ADMIN — HYDROCODONE BITARTRATE AND ACETAMINOPHEN 1 TABLET: 5; 325 TABLET ORAL at 02:26

## 2019-07-11 ASSESSMENT — PAIN SCALES - GENERAL
PAINLEVEL_OUTOF10: 6
PAINLEVEL_OUTOF10: 2
PAINLEVEL_OUTOF10: 2
PAINLEVEL_OUTOF10: 3
PAINLEVEL_OUTOF10: 10
PAINLEVEL_OUTOF10: 7
PAINLEVEL_OUTOF10: 2
PAINLEVEL_OUTOF10: 6
PAINLEVEL_OUTOF10: 3
PAINLEVEL_OUTOF10: 4

## 2019-07-11 ASSESSMENT — PAIN - FUNCTIONAL ASSESSMENT
PAIN_FUNCTIONAL_ASSESSMENT: ACTIVITIES ARE NOT PREVENTED
PAIN_FUNCTIONAL_ASSESSMENT: ACTIVITIES ARE NOT PREVENTED
PAIN_FUNCTIONAL_ASSESSMENT: PREVENTS OR INTERFERES SOME ACTIVE ACTIVITIES AND ADLS
PAIN_FUNCTIONAL_ASSESSMENT: ACTIVITIES ARE NOT PREVENTED
PAIN_FUNCTIONAL_ASSESSMENT: 0-10

## 2019-07-11 ASSESSMENT — PAIN DESCRIPTION - DESCRIPTORS
DESCRIPTORS: ACHING

## 2019-07-11 ASSESSMENT — PAIN DESCRIPTION - LOCATION
LOCATION: KNEE

## 2019-07-11 ASSESSMENT — PAIN DESCRIPTION - PROGRESSION
CLINICAL_PROGRESSION: NOT CHANGED

## 2019-07-11 ASSESSMENT — PAIN DESCRIPTION - ORIENTATION
ORIENTATION: LEFT

## 2019-07-11 ASSESSMENT — PAIN DESCRIPTION - PAIN TYPE
TYPE: ACUTE PAIN;SURGICAL PAIN

## 2019-07-11 ASSESSMENT — PAIN DESCRIPTION - ONSET
ONSET: ON-GOING

## 2019-07-11 ASSESSMENT — PAIN DESCRIPTION - FREQUENCY
FREQUENCY: CONTINUOUS
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
## 2019-07-11 NOTE — PROGRESS NOTES
Progress note: Infectious diseases    Patient - Deedee Shahid,  Age - 76 y.o.    - 1950      Room Number - 7K-28/028-A   MRN -  [id number]   Acct # - [de-identified]  Date of Admission -  2019  9:23 AM    SUBJECTIVE:   No new issues  Culture fluid so far negative. Synovial fluid from the joint shows staph aureus: culture so far negative. Patient wants to go home  OBJECTIVE   VITALS    height is 6' 3\" (1.905 m) and weight is 265 lb (120.2 kg). His oral temperature is 97.9 °F (36.6 °C). His blood pressure is 135/64 and his pulse is 66. His respiration is 18 and oxygen saturation is 95%. Wt Readings from Last 3 Encounters:   19 265 lb (120.2 kg)       I/O (24 Hours)    Intake/Output Summary (Last 24 hours) at 2019 1408  Last data filed at 2019 1321  Gross per 24 hour   Intake 3090.63 ml   Output 4075 ml   Net -984.37 ml       General Appearance  Awake, alert, oriented,  not  In acute distress. HEENT - normocephalic, atraumatic, pink conjunctiva,  anicteric sclera  Neck - Supple, no mass  Lungs -  Bilateral   air entry, no rhonchi, no wheeze  Cardiovascular - Heart sounds are normal.     Abdomen - soft, not distended, non tender.   Neurologic - oriented  Skin - No bruising or bleeding  Extremities - dressed left knee (post surgery)    MEDICATIONS:      gabapentin  300 mg Oral TID    insulin NPH  160 Units Subcutaneous QAM    metFORMIN  500 mg Oral Daily with breakfast    pravastatin  10 mg Oral Nightly    tiZANidine  2 mg Oral TID    sodium chloride flush  10 mL Intravenous 2 times per day    docusate sodium  100 mg Oral BID    famotidine  20 mg Oral BID    ceFAZolin (ANCEF) IVPB  3 g Intravenous Q8H    rivaroxaban  10 mg Oral Daily    insulin lispro  0-12 Units Subcutaneous TID WC    insulin lispro  0-6 Units Subcutaneous Nightly    pyridostigmine  180 mg Oral QAM      sodium

## 2019-07-11 NOTE — PROGRESS NOTES
Second call with message left with Dr. Jeff Moseley office regarding culture report request by Dr. Jaycob Conrad. Await call back.

## 2019-07-11 NOTE — PROGRESS NOTES
Message left with Dr. Nydia Pat office regarding needing culture report at this time. Await call back.

## 2019-07-12 VITALS
TEMPERATURE: 98 F | SYSTOLIC BLOOD PRESSURE: 120 MMHG | RESPIRATION RATE: 16 BRPM | DIASTOLIC BLOOD PRESSURE: 63 MMHG | OXYGEN SATURATION: 98 % | HEIGHT: 75 IN | WEIGHT: 265 LBS | HEART RATE: 66 BPM | BODY MASS INDEX: 32.95 KG/M2

## 2019-07-12 PROCEDURE — 6370000000 HC RX 637 (ALT 250 FOR IP): Performed by: ORTHOPAEDIC SURGERY

## 2019-07-12 PROCEDURE — 6360000002 HC RX W HCPCS: Performed by: INTERNAL MEDICINE

## 2019-07-12 PROCEDURE — 2580000003 HC RX 258: Performed by: ORTHOPAEDIC SURGERY

## 2019-07-12 PROCEDURE — 97535 SELF CARE MNGMENT TRAINING: CPT

## 2019-07-12 PROCEDURE — 97110 THERAPEUTIC EXERCISES: CPT

## 2019-07-12 PROCEDURE — 97530 THERAPEUTIC ACTIVITIES: CPT

## 2019-07-12 PROCEDURE — 97116 GAIT TRAINING THERAPY: CPT

## 2019-07-12 RX ADMIN — ACETAMINOPHEN 650 MG: 325 TABLET ORAL at 08:27

## 2019-07-12 RX ADMIN — PYRIDOSTIGMINE BROMIDE 180 MG: 60 TABLET ORAL at 08:28

## 2019-07-12 RX ADMIN — FAMOTIDINE 20 MG: 20 TABLET ORAL at 08:27

## 2019-07-12 RX ADMIN — GABAPENTIN 300 MG: 300 CAPSULE ORAL at 08:27

## 2019-07-12 RX ADMIN — HYDROCODONE BITARTRATE AND ACETAMINOPHEN 1 TABLET: 5; 325 TABLET ORAL at 05:44

## 2019-07-12 RX ADMIN — HYDROCODONE BITARTRATE AND ACETAMINOPHEN 1 TABLET: 5; 325 TABLET ORAL at 14:36

## 2019-07-12 RX ADMIN — METFORMIN HYDROCHLORIDE 500 MG: 500 TABLET ORAL at 08:27

## 2019-07-12 RX ADMIN — GABAPENTIN 300 MG: 300 CAPSULE ORAL at 14:36

## 2019-07-12 RX ADMIN — TIZANIDINE 2 MG: 4 TABLET ORAL at 08:28

## 2019-07-12 RX ADMIN — HYDROCODONE BITARTRATE AND ACETAMINOPHEN 1 TABLET: 5; 325 TABLET ORAL at 01:43

## 2019-07-12 RX ADMIN — INSULIN HUMAN 160 UNITS: 100 INJECTION, SUSPENSION SUBCUTANEOUS at 11:00

## 2019-07-12 RX ADMIN — Medication 3 G: at 04:12

## 2019-07-12 RX ADMIN — Medication 10 ML: at 08:28

## 2019-07-12 RX ADMIN — DOCUSATE SODIUM 100 MG: 100 CAPSULE, LIQUID FILLED ORAL at 08:27

## 2019-07-12 ASSESSMENT — PAIN SCALES - GENERAL
PAINLEVEL_OUTOF10: 1
PAINLEVEL_OUTOF10: 6
PAINLEVEL_OUTOF10: 1
PAINLEVEL_OUTOF10: 4
PAINLEVEL_OUTOF10: 1

## 2019-07-12 ASSESSMENT — PAIN DESCRIPTION - LOCATION: LOCATION: HEAD

## 2019-07-12 ASSESSMENT — PAIN DESCRIPTION - ORIENTATION: ORIENTATION: ANTERIOR

## 2019-07-12 ASSESSMENT — PAIN DESCRIPTION - DESCRIPTORS: DESCRIPTORS: NAGGING

## 2019-07-12 ASSESSMENT — PAIN DESCRIPTION - FREQUENCY: FREQUENCY: CONTINUOUS

## 2019-07-12 ASSESSMENT — PAIN DESCRIPTION - PAIN TYPE: TYPE: ACUTE PAIN

## 2019-07-12 NOTE — DISCHARGE INSTR - COC
[facility]    · Phone: [phone]  · Fax: [phone]     Dialysis Facility (if applicable)   · Name:  · Address:  · Dialysis Schedule:  · Phone:  · Fax:    / signature: Electronically signed by MAXI John on 7/12/2019 at 10:49 AM     PHYSICIAN SECTION    Prognosis: Good    Condition at Discharge: Stable    Rehab Potential (if transferring to Rehab): Good    Recommended Labs or Other Treatments After Discharge: none    Physician Certification: I certify the above information and transfer of Yovany Velarde  is necessary for the continuing treatment of the diagnosis listed and that he requires [facility] for greater 30 days.      Update Admission H&P: No change in H&P    PHYSICIAN SIGNATURE:  Electronically signed by Les Savage MD on 7/12/19 at 12:31 PM

## 2019-07-12 NOTE — PROGRESS NOTES
FUNCTIONAL MOBILITY:  Assistive Device: 4 Wheeled Walker  Assist Level:  Contact Guard Assistance. Distance: To and from bathroom  Completed functional mobility to/from BR within pt room at fair pace, no LOB noted. Pt requires min cues for walker safety maneuvering within tight spaces- seated rest break after trial of mobility, min fatigue noted and requesting to lie back in bed. ADDITIONAL ACTIVITIES:  Completed BUE exercises x10 reps x1 set using mod resistance band in all joints/planes to increase strength and endurance required for ADLs. Pt required rest break between each exercise and min v/c for proper technique. ASSESSMENT:  Activity Tolerance:  Patient tolerance of  treatment: good. Discharge Recommendations: Continue to assess pending progress, Home with assist PRN  Equipment Recommendations: Other: monitor for LHAE and BSC  Plan: Times per week: 6x  Current Treatment Recommendations: Strengthening, Functional Mobility Training, Endurance Training, Balance Training, Self-Care / ADL, Safety Education & Training, Patient/Caregiver Education & Training, Equipment Evaluation, Education, & procurement, Home Management Training    Patient Education  Patient Education: Safety with transfers and mobility, ADL strategies, BUE exercises. Goals  Short term goals  Time Frame for Short term goals: By discharge  Short term goal 1: Pt to complete LB ADLs using LHAE with no greater than SBA for inc indep with self cares  Short term goal 2: Pt to demo standing tolerance greater than 4 mins with 1-2 UE release at SBA for inc indep with grooming  Short term goal 3: Pt to complete HH distance mobility and various ADL transfers at SBA with 0 cues for safety for inc indep with toileting  Long term goals  Time Frame for Long term goals : NA d/t ELOS    Following session, patient left in safe position with all fall risk precautions in place.

## 2019-07-12 NOTE — PROGRESS NOTES
Report called to Larry Bullard at [facility]. Blue packet for ECF given to patient.  Wife transferring patient

## 2019-07-12 NOTE — CARE COORDINATION
7/12/19, 9:27 AM    DISCHARGE BARRIERS      Spoke with Duane and his wife. They are feeling that an ecf stay would be best before returning home, as Duane's mobility is limited. They prefer [facility] [place], second choice is [facility]. Referral made to [facility] [place], however, facility does not have a bed open until next Wednesday at the earliest.   Referral made to [facility]. Discussed plan to continue solaris outpt at [facility], with wife providing transportation, and IV antibiotic plan. [facility] will review for possible admission.

## 2019-07-14 LAB
AEROBIC CULTURE: NORMAL
ANAEROBIC CULTURE: NORMAL
GRAM STAIN RESULT: NORMAL

## 2019-07-15 LAB
A/G RATIO: 1.6
ALBUMIN SERPL-MCNC: 3.5 G/DL
ALP BLD-CCNC: 79 U/L
ALT SERPL-CCNC: 12 U/L
AST SERPL-CCNC: 17 U/L
BASOPHILS ABSOLUTE: 0.1 /ΜL
BASOPHILS RELATIVE PERCENT: 1.9 %
BILIRUB SERPL-MCNC: 0.6 MG/DL (ref 0.1–1.4)
BILIRUBIN DIRECT: 0.1 MG/DL
BILIRUBIN, INDIRECT: NORMAL
BUN BLDV-MCNC: 19 MG/DL
CALCIUM SERPL-MCNC: 9 MG/DL
CHLORIDE BLD-SCNC: 103 MMOL/L
CO2: 27 MMOL/L
CREAT SERPL-MCNC: 0.9 MG/DL
EOSINOPHILS ABSOLUTE: 0.4 /ΜL
EOSINOPHILS RELATIVE PERCENT: 6.2 %
GFR CALCULATED: 84
GLOBULIN: NORMAL
GLUCOSE BLD-MCNC: 194 MG/DL
HCT VFR BLD CALC: 38.6 % (ref 41–53)
HEMOGLOBIN: 12.4 G/DL (ref 13.5–17.5)
LYMPHOCYTES ABSOLUTE: 1 /ΜL
LYMPHOCYTES RELATIVE PERCENT: 16.2 %
MCH RBC QN AUTO: 27 PG
MCHC RBC AUTO-ENTMCNC: 32.1 G/DL
MCV RBC AUTO: 84.1 FL
MONOCYTES ABSOLUTE: 0.4 /ΜL
MONOCYTES RELATIVE PERCENT: 6.7 %
NEUTROPHILS ABSOLUTE: 4.3 /ΜL
NEUTROPHILS RELATIVE PERCENT: 69 %
PLATELET # BLD: 200 K/ΜL
PMV BLD AUTO: 8.9 FL
POTASSIUM SERPL-SCNC: 4 MMOL/L
PROTEIN TOTAL: 5.7 G/DL
RBC # BLD: 4.59 10^6/ΜL
SODIUM BLD-SCNC: 138 MMOL/L
WBC # BLD: 6.3 10^3/ML

## 2019-07-22 LAB
BASOPHILS ABSOLUTE: ABNORMAL /ΜL
BASOPHILS RELATIVE PERCENT: 0.6 %
BUN BLDV-MCNC: 17 MG/DL
CALCIUM SERPL-MCNC: 9 MG/DL
CHLORIDE BLD-SCNC: 104 MMOL/L
CO2: 32 MMOL/L
CREAT SERPL-MCNC: 0.9 MG/DL
EOSINOPHILS ABSOLUTE: 0.3 /ΜL
EOSINOPHILS RELATIVE PERCENT: 4.9 %
GFR CALCULATED: 84
GLUCOSE BLD-MCNC: 186 MG/DL
HCT VFR BLD CALC: 36.3 % (ref 41–53)
HEMOGLOBIN: 11.9 G/DL (ref 13.5–17.5)
LYMPHOCYTES ABSOLUTE: 0.9 /ΜL
LYMPHOCYTES RELATIVE PERCENT: 15 %
MCH RBC QN AUTO: 27.3 PG
MCHC RBC AUTO-ENTMCNC: 32.9 G/DL
MCV RBC AUTO: 83 FL
MONOCYTES ABSOLUTE: 0.4 /ΜL
MONOCYTES RELATIVE PERCENT: 7.3 %
NEUTROPHILS ABSOLUTE: 4.3 /ΜL
NEUTROPHILS RELATIVE PERCENT: 72.2 %
PLATELET # BLD: 217 K/ΜL
PMV BLD AUTO: 8.5 FL
POTASSIUM SERPL-SCNC: 4.1 MMOL/L
RBC # BLD: 4.37 10^6/ΜL
SODIUM BLD-SCNC: 139 MMOL/L
WBC # BLD: 5.9 10^3/ML

## 2019-08-06 ENCOUNTER — OFFICE VISIT (OUTPATIENT)
Dept: INFECTIOUS DISEASES | Age: 69
End: 2019-08-06
Payer: MEDICARE

## 2019-08-06 VITALS
WEIGHT: 264.99 LBS | HEIGHT: 75 IN | DIASTOLIC BLOOD PRESSURE: 71 MMHG | BODY MASS INDEX: 32.95 KG/M2 | SYSTOLIC BLOOD PRESSURE: 128 MMHG | TEMPERATURE: 98.5 F | HEART RATE: 76 BPM

## 2019-08-06 DIAGNOSIS — T84.7XXA INFECTED ORTHOPEDIC IMPLANT, INITIAL ENCOUNTER (HCC): ICD-10-CM

## 2019-08-06 DIAGNOSIS — T84.7XXA INFECTED ORTHOPEDIC IMPLANT, INITIAL ENCOUNTER (HCC): Primary | ICD-10-CM

## 2019-08-06 PROCEDURE — 99213 OFFICE O/P EST LOW 20 MIN: CPT | Performed by: INTERNAL MEDICINE

## 2019-08-06 RX ORDER — CYCLOBENZAPRINE HCL 10 MG
10 TABLET ORAL 3 TIMES DAILY PRN
COMMUNITY

## 2019-08-06 RX ORDER — ACETAMINOPHEN 325 MG/1
650 TABLET ORAL EVERY 4 HOURS PRN
Status: ON HOLD | COMMUNITY
End: 2019-09-05 | Stop reason: HOSPADM

## 2019-08-06 RX ORDER — DOCUSATE SODIUM 100 MG/1
100 CAPSULE, LIQUID FILLED ORAL DAILY PRN
COMMUNITY
End: 2019-11-27 | Stop reason: ALTCHOICE

## 2019-08-06 NOTE — PROGRESS NOTES
[facility] Infectious Disease         Progress Note      Duane Scheiderer  MEDICAL RECORD NUMBER:  [id number]  AGE: 76 y.o. GENDER: male  : 1950  EPISODE DATE:  2019    Subjective:     Chief Complaint   Patient presents with    Follow-Up from Hospital     Infected left knee prosthesis          HISTORY of PRESENT ILLNESS HPI  Patient is a 66-year-old male patient who has infected left knee prosthesis. He had first stage revision. He is currently on IV Ancef. He is at the nursing home. His blood work was reviewed. He has history of myasthenia gravis he had hx of thymectomy , he is  on Solar[employer]. .  He has no fever or chills he is diabetic and has hyperlipidemia. His blood glucose has been high  After his insulin was adjusted.     PAST MEDICAL HISTORY        Diagnosis Date    Arthritis     Diabetes mellitus ([address]     History of blood transfusion     History of thymectomy     Hyperlipidemia     [facility]ia [facility]     Thyroid disease        PAST SURGICAL HISTORY    Past Surgical History:   Procedure Laterality Date    APPENDECTOMY      BACK SURGERY      x6    CARPAL TUNNEL RELEASE      CHOLECYSTECTOMY      JOINT REPLACEMENT Left     hip and knee    REVISION TOTAL KNEE ARTHROPLASTY Left 2019    FIRST STAGE REVISION LEFT TOTAL KNEE ARTHROPLASTY, ORIF LEFT PATELLA [facility]         FAMILY HISTORY    Family History   Problem Relation Age of Onset    Diabetes Mother     Heart Disease Mother        SOCIAL HISTORY    Social History     Tobacco Use    Smoking status: Never Smoker    Smokeless tobacco: Never Used   Substance Use Topics    Alcohol use: Never     Frequency: Never    Drug use: Never       ALLERGIES    Allergies   Allergen Reactions    Latex Itching and Rash    Bee Venom Shortness Of Breath    Dilaudid [Hydromorphone Hcl] Other (See Comments)     Convulsions      Adhesive Tape patient and signed by patient or POA.              Electronically signed by Zeina Cox MD,FACP@ TD@ at 1:39 PM

## 2019-08-19 LAB
FUNGUS IDENTIFIED: ABNORMAL
FUNGUS IDENTIFIED: ABNORMAL
FUNGUS SMEAR: ABNORMAL
MISC REFERENCE: NORMAL
ORGANISM: ABNORMAL

## 2019-08-26 LAB — AFB CULTURE & SMEAR: NORMAL

## 2019-08-28 NOTE — PROGRESS NOTES
NPO after midnight  [employer] and drivers license  Wear comfortable clean clothing  Do not bring jewelry   Shower night before and morning of surgery with a liquid antibacterial soap  Bring medications in original bottles  Follow all instructions give by your physician   needed at discharge  Call [phone] for any questions

## 2019-08-28 NOTE — PROGRESS NOTES
In preparation for their surgical procedure above patient was screened for Obstructive Sleep Apnea (LENCHO) using the STOP-Bang Questionnaire by the Pre-Admission Testing department. This is a pre-surgical screening tool for patient safety and serves as a recommendation, this WILL NOT cause cancellation of surgery.     STOP-Bang Questionnaire  * Do you currently see a pulmonologist?  No       Has sleep apnea but does not use his cpap

## 2019-08-29 DIAGNOSIS — T84.7XXA INFECTED ORTHOPEDIC IMPLANT, INITIAL ENCOUNTER (HCC): ICD-10-CM

## 2019-09-03 ENCOUNTER — APPOINTMENT (OUTPATIENT)
Dept: GENERAL RADIOLOGY | Age: 69
DRG: 468 | End: 2019-09-03
Attending: ORTHOPAEDIC SURGERY
Payer: MEDICARE

## 2019-09-03 ENCOUNTER — ANESTHESIA (OUTPATIENT)
Dept: OPERATING ROOM | Age: 69
DRG: 468 | End: 2019-09-03
Payer: MEDICARE

## 2019-09-03 ENCOUNTER — HOSPITAL ENCOUNTER (INPATIENT)
Age: 69
LOS: 3 days | Discharge: HOME | DRG: 468 | End: 2019-09-06
Attending: ORTHOPAEDIC SURGERY | Admitting: ORTHOPAEDIC SURGERY
Payer: MEDICARE

## 2019-09-03 ENCOUNTER — ANESTHESIA EVENT (OUTPATIENT)
Dept: OPERATING ROOM | Age: 69
DRG: 468 | End: 2019-09-03
Payer: MEDICARE

## 2019-09-03 VITALS
TEMPERATURE: 96.8 F | OXYGEN SATURATION: 99 % | DIASTOLIC BLOOD PRESSURE: 114 MMHG | RESPIRATION RATE: 2 BRPM | SYSTOLIC BLOOD PRESSURE: 160 MMHG

## 2019-09-03 DIAGNOSIS — T84.019A FAILED TOTAL JOINT REPLACEMENT, INITIAL ENCOUNTER (HCC): ICD-10-CM

## 2019-09-03 DIAGNOSIS — T84.7XXA INFECTED ORTHOPEDIC IMPLANT, INITIAL ENCOUNTER (HCC): Primary | ICD-10-CM

## 2019-09-03 LAB
ABO: NORMAL
ANTIBODY SCREEN: NORMAL
CHARACTER,SYN.FL: ABNORMAL
COLOR FLUID: ABNORMAL
CRYSTALS, FLUID: ABNORMAL
INTERPRETATION: ABNORMAL
MONONUCLEAR CELLS SYNOVIAL FLUID: 33.5 % (ref 0–74)
PATHOLOGIST REVIEW: ABNORMAL
POLYMORPHONUCLEAR CELLS SYNOVIAL FLUID: 66.5 % (ref 0–24)
RH FACTOR: NORMAL
TOTAL NUCLEATED CELLS SYNOVIAL: 6422 /CUMM (ref 0–150)
TOTAL VOLUME RECEIVED SYNOVIAL: 8 ML

## 2019-09-03 PROCEDURE — 2580000003 HC RX 258: Performed by: PHYSICIAN ASSISTANT

## 2019-09-03 PROCEDURE — 2580000003 HC RX 258: Performed by: ANESTHESIOLOGY

## 2019-09-03 PROCEDURE — 2720000010 HC SURG SUPPLY STERILE: Performed by: ORTHOPAEDIC SURGERY

## 2019-09-03 PROCEDURE — 3600000015 HC SURGERY LEVEL 5 ADDTL 15MIN: Performed by: ORTHOPAEDIC SURGERY

## 2019-09-03 PROCEDURE — C1713 ANCHOR/SCREW BN/BN,TIS/BN: HCPCS | Performed by: ORTHOPAEDIC SURGERY

## 2019-09-03 PROCEDURE — 6360000002 HC RX W HCPCS: Performed by: ORTHOPAEDIC SURGERY

## 2019-09-03 PROCEDURE — 7100000000 HC PACU RECOVERY - FIRST 15 MIN: Performed by: ORTHOPAEDIC SURGERY

## 2019-09-03 PROCEDURE — 6360000002 HC RX W HCPCS: Performed by: ANESTHESIOLOGY

## 2019-09-03 PROCEDURE — 87077 CULTURE AEROBIC IDENTIFY: CPT

## 2019-09-03 PROCEDURE — 87075 CULTR BACTERIA EXCEPT BLOOD: CPT

## 2019-09-03 PROCEDURE — 87070 CULTURE OTHR SPECIMN AEROBIC: CPT

## 2019-09-03 PROCEDURE — 7100000001 HC PACU RECOVERY - ADDTL 15 MIN: Performed by: ORTHOPAEDIC SURGERY

## 2019-09-03 PROCEDURE — C1776 JOINT DEVICE (IMPLANTABLE): HCPCS | Performed by: ORTHOPAEDIC SURGERY

## 2019-09-03 PROCEDURE — 6370000000 HC RX 637 (ALT 250 FOR IP): Performed by: ORTHOPAEDIC SURGERY

## 2019-09-03 PROCEDURE — 2500000003 HC RX 250 WO HCPCS: Performed by: ANESTHESIOLOGY

## 2019-09-03 PROCEDURE — 86850 RBC ANTIBODY SCREEN: CPT

## 2019-09-03 PROCEDURE — 3700000000 HC ANESTHESIA ATTENDED CARE: Performed by: ORTHOPAEDIC SURGERY

## 2019-09-03 PROCEDURE — 89060 EXAM SYNOVIAL FLUID CRYSTALS: CPT

## 2019-09-03 PROCEDURE — 0SPD08Z REMOVAL OF SPACER FROM LEFT KNEE JOINT, OPEN APPROACH: ICD-10-PCS | Performed by: ORTHOPAEDIC SURGERY

## 2019-09-03 PROCEDURE — 87158 CULTURE TYPING ADDED METHOD: CPT

## 2019-09-03 PROCEDURE — 87102 FUNGUS ISOLATION CULTURE: CPT

## 2019-09-03 PROCEDURE — 64447 NJX AA&/STRD FEMORAL NRV IMG: CPT | Performed by: ANESTHESIOLOGY

## 2019-09-03 PROCEDURE — 87206 SMEAR FLUORESCENT/ACID STAI: CPT

## 2019-09-03 PROCEDURE — 3700000001 HC ADD 15 MINUTES (ANESTHESIA): Performed by: ORTHOPAEDIC SURGERY

## 2019-09-03 PROCEDURE — 3600000005 HC SURGERY LEVEL 5 BASE: Performed by: ORTHOPAEDIC SURGERY

## 2019-09-03 PROCEDURE — 89050 BODY FLUID CELL COUNT: CPT

## 2019-09-03 PROCEDURE — 73560 X-RAY EXAM OF KNEE 1 OR 2: CPT

## 2019-09-03 PROCEDURE — 87205 SMEAR GRAM STAIN: CPT

## 2019-09-03 PROCEDURE — 86901 BLOOD TYPING SEROLOGIC RH(D): CPT

## 2019-09-03 PROCEDURE — 36415 COLL VENOUS BLD VENIPUNCTURE: CPT

## 2019-09-03 PROCEDURE — 86900 BLOOD TYPING SEROLOGIC ABO: CPT

## 2019-09-03 PROCEDURE — 88305 TISSUE EXAM BY PATHOLOGIST: CPT

## 2019-09-03 PROCEDURE — 0SRD069 REPLACEMENT OF LEFT KNEE JOINT WITH OXIDIZED ZIRCONIUM ON POLYETHYLENE SYNTHETIC SUBSTITUTE, CEMENTED, OPEN APPROACH: ICD-10-PCS | Performed by: ORTHOPAEDIC SURGERY

## 2019-09-03 PROCEDURE — 6370000000 HC RX 637 (ALT 250 FOR IP): Performed by: PHYSICIAN ASSISTANT

## 2019-09-03 PROCEDURE — 2500000003 HC RX 250 WO HCPCS: Performed by: ORTHOPAEDIC SURGERY

## 2019-09-03 PROCEDURE — 1200000000 HC SEMI PRIVATE

## 2019-09-03 PROCEDURE — 88331 PATH CONSLTJ SURG 1 BLK 1SPC: CPT

## 2019-09-03 PROCEDURE — 2709999900 HC NON-CHARGEABLE SUPPLY: Performed by: ORTHOPAEDIC SURGERY

## 2019-09-03 PROCEDURE — 2709999900 HC NON-CHARGEABLE SUPPLY

## 2019-09-03 PROCEDURE — 87186 SC STD MICRODIL/AGAR DIL: CPT

## 2019-09-03 PROCEDURE — 3E0T3BZ INTRODUCTION OF ANESTHETIC AGENT INTO PERIPHERAL NERVES AND PLEXI, PERCUTANEOUS APPROACH: ICD-10-PCS | Performed by: ANESTHESIOLOGY

## 2019-09-03 DEVICE — LEGION OFFSET COUPLER 2MM
Type: IMPLANTABLE DEVICE | Site: KNEE | Status: FUNCTIONAL
Brand: LEGION

## 2019-09-03 DEVICE — GENESIS II POSTERIOR STABILIZED                                    HIGH FLEXION INSERT SIZE 5-6 11MM
Type: IMPLANTABLE DEVICE | Site: KNEE | Status: FUNCTIONAL
Brand: GENESIS II

## 2019-09-03 DEVICE — LEGION PRESSFIT STEM 18MM X 160MM
Type: IMPLANTABLE DEVICE | Site: KNEE | Status: FUNCTIONAL
Brand: LEGION

## 2019-09-03 DEVICE — VERSABOND 40 GRAMS FORMULATION 2
Type: IMPLANTABLE DEVICE | Site: KNEE | Status: FUNCTIONAL
Brand: VERSABOND

## 2019-09-03 DEVICE — LEGION REVISION TIBIAL BASEPLATE                                    SIZE 6 LEFT
Type: IMPLANTABLE DEVICE | Site: KNEE | Status: FUNCTIONAL
Brand: LEGION

## 2019-09-03 DEVICE — LEGION PRESSFIT STEM 16MM X 160MM
Type: IMPLANTABLE DEVICE | Site: KNEE | Status: FUNCTIONAL
Brand: LEGION

## 2019-09-03 DEVICE — LEGION SCREW-ON DISTAL FEMORAL                                    WEDGE SIZE 7 5MM
Type: IMPLANTABLE DEVICE | Site: KNEE | Status: FUNCTIONAL
Brand: LEGION

## 2019-09-03 DEVICE — LEGION OXINIUM CONSTRAINED FEMORAL                                    SIZE 7 LEFT
Type: IMPLANTABLE DEVICE | Site: KNEE | Status: FUNCTIONAL
Brand: LEGION

## 2019-09-03 RX ORDER — DEXTROSE MONOHYDRATE 25 G/50ML
INJECTION, SOLUTION INTRAVENOUS PRN
Status: DISCONTINUED | OUTPATIENT
Start: 2019-09-03 | End: 2019-09-03 | Stop reason: SDUPTHER

## 2019-09-03 RX ORDER — BUPIVACAINE HYDROCHLORIDE 5 MG/ML
INJECTION, SOLUTION EPIDURAL; INTRACAUDAL
Status: DISCONTINUED | OUTPATIENT
Start: 2019-09-03 | End: 2019-09-03 | Stop reason: SDUPTHER

## 2019-09-03 RX ORDER — HYDROCODONE BITARTRATE AND ACETAMINOPHEN 5; 325 MG/1; MG/1
1 TABLET ORAL EVERY 4 HOURS PRN
Status: DISCONTINUED | OUTPATIENT
Start: 2019-09-03 | End: 2019-09-04

## 2019-09-03 RX ORDER — CELECOXIB 100 MG/1
100 CAPSULE ORAL ONCE
Status: COMPLETED | OUTPATIENT
Start: 2019-09-03 | End: 2019-09-03

## 2019-09-03 RX ORDER — ACETAMINOPHEN 500 MG
1000 TABLET ORAL ONCE
Status: COMPLETED | OUTPATIENT
Start: 2019-09-03 | End: 2019-09-03

## 2019-09-03 RX ORDER — BUPIVACAINE HYDROCHLORIDE 5 MG/ML
INJECTION, SOLUTION EPIDURAL; INTRACAUDAL PRN
Status: DISCONTINUED | OUTPATIENT
Start: 2019-09-03 | End: 2019-09-03 | Stop reason: ALTCHOICE

## 2019-09-03 RX ORDER — FENTANYL CITRATE 50 UG/ML
50 INJECTION, SOLUTION INTRAMUSCULAR; INTRAVENOUS EVERY 5 MIN PRN
Status: DISCONTINUED | OUTPATIENT
Start: 2019-09-03 | End: 2019-09-03 | Stop reason: HOSPADM

## 2019-09-03 RX ORDER — FENTANYL CITRATE 50 UG/ML
INJECTION, SOLUTION INTRAMUSCULAR; INTRAVENOUS PRN
Status: DISCONTINUED | OUTPATIENT
Start: 2019-09-03 | End: 2019-09-03 | Stop reason: SDUPTHER

## 2019-09-03 RX ORDER — LIDOCAINE HYDROCHLORIDE 10 MG/ML
INJECTION, SOLUTION INFILTRATION; PERINEURAL PRN
Status: DISCONTINUED | OUTPATIENT
Start: 2019-09-03 | End: 2019-09-03 | Stop reason: SDUPTHER

## 2019-09-03 RX ORDER — SODIUM CHLORIDE 9 MG/ML
INJECTION, SOLUTION INTRAVENOUS CONTINUOUS
Status: DISCONTINUED | OUTPATIENT
Start: 2019-09-03 | End: 2019-09-03

## 2019-09-03 RX ORDER — PYRIDOSTIGMINE BROMIDE 60 MG/1
30 TABLET ORAL 2 TIMES DAILY
Status: DISCONTINUED | OUTPATIENT
Start: 2019-09-03 | End: 2019-09-03 | Stop reason: ALTCHOICE

## 2019-09-03 RX ORDER — TRAMADOL HYDROCHLORIDE 50 MG/1
50 TABLET ORAL EVERY 6 HOURS PRN
Status: DISCONTINUED | OUTPATIENT
Start: 2019-09-03 | End: 2019-09-06 | Stop reason: HOSPADM

## 2019-09-03 RX ORDER — MORPHINE SULFATE 2 MG/ML
2 INJECTION, SOLUTION INTRAMUSCULAR; INTRAVENOUS
Status: DISPENSED | OUTPATIENT
Start: 2019-09-03 | End: 2019-09-04

## 2019-09-03 RX ORDER — TRANEXAMIC ACID 100 MG/ML
INJECTION, SOLUTION INTRAVENOUS PRN
Status: DISCONTINUED | OUTPATIENT
Start: 2019-09-03 | End: 2019-09-03 | Stop reason: ALTCHOICE

## 2019-09-03 RX ORDER — DEXTROSE MONOHYDRATE 25 G/50ML
12.5 INJECTION, SOLUTION INTRAVENOUS PRN
Status: DISCONTINUED | OUTPATIENT
Start: 2019-09-03 | End: 2019-09-06 | Stop reason: HOSPADM

## 2019-09-03 RX ORDER — NICOTINE POLACRILEX 4 MG
15 LOZENGE BUCCAL PRN
Status: DISCONTINUED | OUTPATIENT
Start: 2019-09-03 | End: 2019-09-06 | Stop reason: HOSPADM

## 2019-09-03 RX ORDER — DOCUSATE SODIUM 100 MG/1
100 CAPSULE, LIQUID FILLED ORAL 2 TIMES DAILY
Status: DISCONTINUED | OUTPATIENT
Start: 2019-09-03 | End: 2019-09-06 | Stop reason: HOSPADM

## 2019-09-03 RX ORDER — PROMETHAZINE HYDROCHLORIDE 25 MG/ML
12.5 INJECTION, SOLUTION INTRAMUSCULAR; INTRAVENOUS
Status: DISCONTINUED | OUTPATIENT
Start: 2019-09-03 | End: 2019-09-03 | Stop reason: HOSPADM

## 2019-09-03 RX ORDER — TIZANIDINE 4 MG/1
2 TABLET ORAL 3 TIMES DAILY
Status: DISCONTINUED | OUTPATIENT
Start: 2019-09-03 | End: 2019-09-06 | Stop reason: HOSPADM

## 2019-09-03 RX ORDER — ROCURONIUM BROMIDE 10 MG/ML
INJECTION, SOLUTION INTRAVENOUS PRN
Status: DISCONTINUED | OUTPATIENT
Start: 2019-09-03 | End: 2019-09-03 | Stop reason: SDUPTHER

## 2019-09-03 RX ORDER — GABAPENTIN 300 MG/1
300 CAPSULE ORAL ONCE
Status: COMPLETED | OUTPATIENT
Start: 2019-09-03 | End: 2019-09-03

## 2019-09-03 RX ORDER — SODIUM CHLORIDE 0.9 % (FLUSH) 0.9 %
10 SYRINGE (ML) INJECTION PRN
Status: DISCONTINUED | OUTPATIENT
Start: 2019-09-03 | End: 2019-09-03

## 2019-09-03 RX ORDER — PYRIDOSTIGMINE BROMIDE 60 MG/1
180 TABLET ORAL DAILY
Status: DISCONTINUED | OUTPATIENT
Start: 2019-09-03 | End: 2019-09-06 | Stop reason: HOSPADM

## 2019-09-03 RX ORDER — PRAVASTATIN SODIUM 10 MG
10 TABLET ORAL NIGHTLY
Status: DISCONTINUED | OUTPATIENT
Start: 2019-09-03 | End: 2019-09-06 | Stop reason: HOSPADM

## 2019-09-03 RX ORDER — LABETALOL 20 MG/4 ML (5 MG/ML) INTRAVENOUS SYRINGE
5 EVERY 10 MIN PRN
Status: DISCONTINUED | OUTPATIENT
Start: 2019-09-03 | End: 2019-09-03 | Stop reason: HOSPADM

## 2019-09-03 RX ORDER — PROPOFOL 10 MG/ML
INJECTION, EMULSION INTRAVENOUS CONTINUOUS PRN
Status: DISCONTINUED | OUTPATIENT
Start: 2019-09-03 | End: 2019-09-03 | Stop reason: SDUPTHER

## 2019-09-03 RX ORDER — SODIUM CHLORIDE 0.9 % (FLUSH) 0.9 %
10 SYRINGE (ML) INJECTION EVERY 12 HOURS SCHEDULED
Status: DISCONTINUED | OUTPATIENT
Start: 2019-09-03 | End: 2019-09-06 | Stop reason: HOSPADM

## 2019-09-03 RX ORDER — MIDAZOLAM HYDROCHLORIDE 1 MG/ML
INJECTION INTRAMUSCULAR; INTRAVENOUS PRN
Status: DISCONTINUED | OUTPATIENT
Start: 2019-09-03 | End: 2019-09-03 | Stop reason: SDUPTHER

## 2019-09-03 RX ORDER — VANCOMYCIN HYDROCHLORIDE 500 MG/10ML
INJECTION, POWDER, LYOPHILIZED, FOR SOLUTION INTRAVENOUS PRN
Status: DISCONTINUED | OUTPATIENT
Start: 2019-09-03 | End: 2019-09-03 | Stop reason: ALTCHOICE

## 2019-09-03 RX ORDER — ONDANSETRON 2 MG/ML
4 INJECTION INTRAMUSCULAR; INTRAVENOUS EVERY 6 HOURS PRN
Status: DISCONTINUED | OUTPATIENT
Start: 2019-09-03 | End: 2019-09-06 | Stop reason: HOSPADM

## 2019-09-03 RX ORDER — GABAPENTIN 300 MG/1
300 CAPSULE ORAL 3 TIMES DAILY
Status: DISCONTINUED | OUTPATIENT
Start: 2019-09-03 | End: 2019-09-06 | Stop reason: HOSPADM

## 2019-09-03 RX ORDER — SODIUM CHLORIDE 0.9 % (FLUSH) 0.9 %
10 SYRINGE (ML) INJECTION PRN
Status: DISCONTINUED | OUTPATIENT
Start: 2019-09-03 | End: 2019-09-06 | Stop reason: HOSPADM

## 2019-09-03 RX ORDER — SODIUM CHLORIDE 9 MG/ML
INJECTION, SOLUTION INTRAVENOUS CONTINUOUS
Status: DISCONTINUED | OUTPATIENT
Start: 2019-09-03 | End: 2019-09-06 | Stop reason: HOSPADM

## 2019-09-03 RX ORDER — DOCUSATE SODIUM 100 MG/1
100 CAPSULE, LIQUID FILLED ORAL DAILY PRN
Status: DISCONTINUED | OUTPATIENT
Start: 2019-09-03 | End: 2019-09-06 | Stop reason: HOSPADM

## 2019-09-03 RX ORDER — PYRIDOSTIGMINE BROMIDE 60 MG/1
60 TABLET ORAL NIGHTLY PRN
Status: DISCONTINUED | OUTPATIENT
Start: 2019-09-03 | End: 2019-09-06 | Stop reason: HOSPADM

## 2019-09-03 RX ORDER — MORPHINE SULFATE 0.5 MG/ML
INJECTION, SOLUTION EPIDURAL; INTRATHECAL; INTRAVENOUS PRN
Status: DISCONTINUED | OUTPATIENT
Start: 2019-09-03 | End: 2019-09-03 | Stop reason: SDUPTHER

## 2019-09-03 RX ORDER — MEPERIDINE HYDROCHLORIDE 25 MG/ML
12.5 INJECTION INTRAMUSCULAR; INTRAVENOUS; SUBCUTANEOUS EVERY 5 MIN PRN
Status: DISCONTINUED | OUTPATIENT
Start: 2019-09-03 | End: 2019-09-03 | Stop reason: HOSPADM

## 2019-09-03 RX ORDER — FAMOTIDINE 20 MG/1
20 TABLET, FILM COATED ORAL 2 TIMES DAILY
Status: DISCONTINUED | OUTPATIENT
Start: 2019-09-03 | End: 2019-09-06 | Stop reason: HOSPADM

## 2019-09-03 RX ORDER — PROPOFOL 10 MG/ML
INJECTION, EMULSION INTRAVENOUS PRN
Status: DISCONTINUED | OUTPATIENT
Start: 2019-09-03 | End: 2019-09-03 | Stop reason: SDUPTHER

## 2019-09-03 RX ORDER — TRAMADOL HYDROCHLORIDE 50 MG/1
50 TABLET ORAL EVERY 6 HOURS PRN
COMMUNITY

## 2019-09-03 RX ORDER — DEXTROSE MONOHYDRATE 50 MG/ML
100 INJECTION, SOLUTION INTRAVENOUS PRN
Status: DISCONTINUED | OUTPATIENT
Start: 2019-09-03 | End: 2019-09-06 | Stop reason: HOSPADM

## 2019-09-03 RX ORDER — SODIUM CHLORIDE 0.9 % (FLUSH) 0.9 %
10 SYRINGE (ML) INJECTION EVERY 12 HOURS SCHEDULED
Status: DISCONTINUED | OUTPATIENT
Start: 2019-09-03 | End: 2019-09-03

## 2019-09-03 RX ORDER — FENTANYL CITRATE 50 UG/ML
25 INJECTION, SOLUTION INTRAMUSCULAR; INTRAVENOUS EVERY 5 MIN PRN
Status: DISCONTINUED | OUTPATIENT
Start: 2019-09-03 | End: 2019-09-03 | Stop reason: HOSPADM

## 2019-09-03 RX ADMIN — ROCURONIUM BROMIDE 40 MG: 10 INJECTION INTRAVENOUS at 10:18

## 2019-09-03 RX ADMIN — FENTANYL CITRATE 50 MCG: 50 INJECTION INTRAMUSCULAR; INTRAVENOUS at 13:10

## 2019-09-03 RX ADMIN — Medication 2 G: at 18:13

## 2019-09-03 RX ADMIN — HYDROMORPHONE HYDROCHLORIDE 0.5 MG: 1 INJECTION, SOLUTION INTRAMUSCULAR; INTRAVENOUS; SUBCUTANEOUS at 13:35

## 2019-09-03 RX ADMIN — FENTANYL CITRATE 100 MCG: 50 INJECTION INTRAMUSCULAR; INTRAVENOUS at 10:49

## 2019-09-03 RX ADMIN — MIDAZOLAM HYDROCHLORIDE 2 MG: 1 INJECTION, SOLUTION INTRAMUSCULAR; INTRAVENOUS at 09:56

## 2019-09-03 RX ADMIN — FENTANYL CITRATE: 50 INJECTION INTRAMUSCULAR; INTRAVENOUS at 12:45

## 2019-09-03 RX ADMIN — TIZANIDINE 2 MG: 4 TABLET ORAL at 18:11

## 2019-09-03 RX ADMIN — RIVAROXABAN 10 MG: 10 TABLET, FILM COATED ORAL at 20:32

## 2019-09-03 RX ADMIN — BUPIVACAINE HYDROCHLORIDE 30 ML: 5 INJECTION, SOLUTION EPIDURAL; INTRACAUDAL; PERINEURAL at 13:15

## 2019-09-03 RX ADMIN — PRAVASTATIN SODIUM 10 MG: 10 TABLET ORAL at 20:33

## 2019-09-03 RX ADMIN — MORPHINE SULFATE 2 MG: 2 INJECTION, SOLUTION INTRAMUSCULAR; INTRAVENOUS at 19:29

## 2019-09-03 RX ADMIN — DEXTROSE MONOHYDRATE 10 G: 25 INJECTION, SOLUTION INTRAVENOUS at 11:50

## 2019-09-03 RX ADMIN — HYDROCODONE BITARTRATE AND ACETAMINOPHEN 1 TABLET: 5; 325 TABLET ORAL at 22:50

## 2019-09-03 RX ADMIN — FENTANYL CITRATE 75 MCG: 50 INJECTION INTRAMUSCULAR; INTRAVENOUS at 10:19

## 2019-09-03 RX ADMIN — SUGAMMADEX 200 MG: 100 INJECTION, SOLUTION INTRAVENOUS at 12:18

## 2019-09-03 RX ADMIN — GABAPENTIN 300 MG: 300 CAPSULE ORAL at 18:11

## 2019-09-03 RX ADMIN — TIZANIDINE 2 MG: 4 TABLET ORAL at 20:33

## 2019-09-03 RX ADMIN — PROPOFOL 150 MCG/KG/MIN: 10 INJECTION, EMULSION INTRAVENOUS at 10:18

## 2019-09-03 RX ADMIN — PROPOFOL 200 MG: 10 INJECTION, EMULSION INTRAVENOUS at 10:18

## 2019-09-03 RX ADMIN — SODIUM CHLORIDE: 9 INJECTION, SOLUTION INTRAVENOUS at 08:01

## 2019-09-03 RX ADMIN — FENTANYL CITRATE 25 MCG: 50 INJECTION INTRAMUSCULAR; INTRAVENOUS at 10:23

## 2019-09-03 RX ADMIN — MORPHINE SULFATE 5 MG: 0.5 INJECTION, SOLUTION EPIDURAL; INTRATHECAL; INTRAVENOUS at 10:24

## 2019-09-03 RX ADMIN — GABAPENTIN 300 MG: 300 CAPSULE ORAL at 08:02

## 2019-09-03 RX ADMIN — GABAPENTIN 300 MG: 300 CAPSULE ORAL at 20:33

## 2019-09-03 RX ADMIN — LABETALOL 20 MG/4 ML (5 MG/ML) INTRAVENOUS SYRINGE 5 MG: at 13:10

## 2019-09-03 RX ADMIN — FENTANYL CITRATE 100 MCG: 50 INJECTION INTRAMUSCULAR; INTRAVENOUS at 10:27

## 2019-09-03 RX ADMIN — CELECOXIB 100 MG: 100 CAPSULE ORAL at 08:02

## 2019-09-03 RX ADMIN — DOCUSATE SODIUM 100 MG: 100 CAPSULE, LIQUID FILLED ORAL at 20:33

## 2019-09-03 RX ADMIN — FENTANYL CITRATE 0.5 MCG: 50 INJECTION INTRAMUSCULAR; INTRAVENOUS at 09:56

## 2019-09-03 RX ADMIN — FAMOTIDINE 20 MG: 20 TABLET ORAL at 20:33

## 2019-09-03 RX ADMIN — ACETAMINOPHEN 1000 MG: 500 TABLET ORAL at 08:02

## 2019-09-03 RX ADMIN — LIDOCAINE HYDROCHLORIDE 40 MG: 10 INJECTION, SOLUTION INFILTRATION; PERINEURAL at 10:18

## 2019-09-03 RX ADMIN — FENTANYL CITRATE 25 MCG: 50 INJECTION INTRAMUSCULAR; INTRAVENOUS at 13:30

## 2019-09-03 RX ADMIN — HYDROCODONE BITARTRATE AND ACETAMINOPHEN 1 TABLET: 5; 325 TABLET ORAL at 18:11

## 2019-09-03 RX ADMIN — FENTANYL CITRATE 50 MCG: 50 INJECTION INTRAMUSCULAR; INTRAVENOUS at 12:00

## 2019-09-03 ASSESSMENT — PULMONARY FUNCTION TESTS
PIF_VALUE: 20
PIF_VALUE: 21
PIF_VALUE: 23
PIF_VALUE: 21
PIF_VALUE: 20
PIF_VALUE: 21
PIF_VALUE: 21
PIF_VALUE: 0
PIF_VALUE: 21
PIF_VALUE: 0
PIF_VALUE: 21
PIF_VALUE: 21
PIF_VALUE: 2
PIF_VALUE: 21
PIF_VALUE: 11
PIF_VALUE: 23
PIF_VALUE: 21
PIF_VALUE: 0
PIF_VALUE: 21
PIF_VALUE: 21
PIF_VALUE: 20
PIF_VALUE: 21
PIF_VALUE: 20
PIF_VALUE: 21
PIF_VALUE: 21
PIF_VALUE: 19
PIF_VALUE: 20
PIF_VALUE: 21
PIF_VALUE: 20
PIF_VALUE: 21
PIF_VALUE: 21
PIF_VALUE: 19
PIF_VALUE: 21
PIF_VALUE: 1
PIF_VALUE: 21
PIF_VALUE: 18
PIF_VALUE: 21
PIF_VALUE: 20
PIF_VALUE: 21
PIF_VALUE: 20
PIF_VALUE: 21
PIF_VALUE: 20
PIF_VALUE: 21
PIF_VALUE: 0
PIF_VALUE: 21
PIF_VALUE: 22
PIF_VALUE: 20
PIF_VALUE: 21
PIF_VALUE: 20
PIF_VALUE: 1
PIF_VALUE: 1
PIF_VALUE: 21
PIF_VALUE: 22
PIF_VALUE: 21
PIF_VALUE: 22
PIF_VALUE: 21
PIF_VALUE: 21
PIF_VALUE: 20
PIF_VALUE: 20
PIF_VALUE: 21
PIF_VALUE: 20
PIF_VALUE: 20
PIF_VALUE: 21
PIF_VALUE: 20
PIF_VALUE: 21
PIF_VALUE: 21
PIF_VALUE: 19
PIF_VALUE: 21
PIF_VALUE: 20
PIF_VALUE: 21
PIF_VALUE: 21
PIF_VALUE: 20
PIF_VALUE: 11
PIF_VALUE: 21
PIF_VALUE: 13
PIF_VALUE: 3
PIF_VALUE: 21
PIF_VALUE: 20
PIF_VALUE: 21
PIF_VALUE: 21
PIF_VALUE: 22
PIF_VALUE: 20
PIF_VALUE: 0
PIF_VALUE: 21

## 2019-09-03 ASSESSMENT — PAIN DESCRIPTION - LOCATION
LOCATION: KNEE

## 2019-09-03 ASSESSMENT — PAIN SCALES - GENERAL
PAINLEVEL_OUTOF10: 6
PAINLEVEL_OUTOF10: 10
PAINLEVEL_OUTOF10: 6
PAINLEVEL_OUTOF10: 8
PAINLEVEL_OUTOF10: 10
PAINLEVEL_OUTOF10: 6
PAINLEVEL_OUTOF10: 6
PAINLEVEL_OUTOF10: 3
PAINLEVEL_OUTOF10: 7
PAINLEVEL_OUTOF10: 10
PAINLEVEL_OUTOF10: 10
PAINLEVEL_OUTOF10: 8

## 2019-09-03 ASSESSMENT — PAIN DESCRIPTION - DESCRIPTORS
DESCRIPTORS: SHARP;ACHING
DESCRIPTORS: ACHING

## 2019-09-03 ASSESSMENT — PAIN DESCRIPTION - ONSET: ONSET: GRADUAL

## 2019-09-03 ASSESSMENT — PAIN DESCRIPTION - PAIN TYPE
TYPE: SURGICAL PAIN

## 2019-09-03 ASSESSMENT — PAIN DESCRIPTION - ORIENTATION
ORIENTATION: LEFT

## 2019-09-03 ASSESSMENT — PAIN - FUNCTIONAL ASSESSMENT
PAIN_FUNCTIONAL_ASSESSMENT: 0-10
PAIN_FUNCTIONAL_ASSESSMENT: ACTIVITIES ARE NOT PREVENTED

## 2019-09-03 ASSESSMENT — PAIN DESCRIPTION - FREQUENCY: FREQUENCY: INTERMITTENT

## 2019-09-03 ASSESSMENT — PAIN DESCRIPTION - PROGRESSION
CLINICAL_PROGRESSION: GRADUALLY IMPROVING

## 2019-09-03 NOTE — ANESTHESIA PRE PROCEDURE
Smokeless tobacco: Never Used   Substance Use Topics    Alcohol use: Never     Frequency: Never                                Counseling given: Not Answered      Vital Signs (Current):   Vitals:    08/28/19 1440 09/03/19 0727   BP:  (!) 152/71   Pulse:  84   Resp:  16   Temp:  97.8 °F (36.6 °C)   TempSrc:  Temporal   SpO2:  96%   Weight: 259 lb (117.5 kg) 259 lb 6.4 oz (117.7 kg)   Height: 6' 3\" (1.905 m) 6' 3\" (1.905 m)                                              BP Readings from Last 3 Encounters:   09/03/19 (!) 152/71   08/06/19 128/71   07/12/19 120/63       NPO Status: Time of last liquid consumption: 2100                        Time of last solid consumption: 2100                        Date of last liquid consumption: 09/02/19                        Date of last solid food consumption: 09/02/19    BMI:   Wt Readings from Last 3 Encounters:   09/03/19 259 lb 6.4 oz (117.7 kg)   08/06/19 264 lb 15.9 oz (120.2 kg)   07/09/19 265 lb (120.2 kg)     Body mass index is 32.42 kg/m². CBC:   Lab Results   Component Value Date    WBC 5.9 07/22/2019    RBC 4.37 07/22/2019    HGB 11.9 07/22/2019    HCT 36.3 07/22/2019    MCV 83.0 07/22/2019     07/22/2019       CMP:   Lab Results   Component Value Date     07/22/2019    K 4.1 07/22/2019     07/22/2019    CO2 32 07/22/2019    BUN 17 07/22/2019    CREATININE 0.9 07/22/2019    AGRATIO 1.6 07/15/2019    LABGLOM 84 07/22/2019    LABGLOM 74 07/10/2019    GLUCOSE 186 07/22/2019    PROT 5.7 07/15/2019    CALCIUM 9.0 07/22/2019    BILITOT 0.6 07/15/2019    ALKPHOS 79 07/15/2019    AST 17 07/15/2019    ALT 12 07/15/2019       POC Tests: No results for input(s): POCGLU, POCNA, POCK, POCCL, POCBUN, POCHEMO, POCHCT in the last 72 hours.     Coags: No results found for: PROTIME, INR, APTT    HCG (If Applicable): No results found for: PREGTESTUR, PREGSERUM, HCG, HCGQUANT     ABGs: No results found for: PHART, PO2ART, PQT1QDH, YTB0URT, BEART, Y5DJYYIC     Type &

## 2019-09-03 NOTE — ANESTHESIA POSTPROCEDURE EVALUATION
Department of Anesthesiology  Postprocedure Note    Patient: Landon Verma  MRN: [id number]  YOB: 1950  Date of evaluation: 9/3/2019  Time:  2:32 PM     Procedure Summary     Date:  09/03/19 [facility] / [facility]  [place] [facility]    Anesthesia Start:  3633 Anesthesia Stop:  4694    Procedure:  SECOND STAGE REVISION LEFT TOTAL KNEE (Left Knee) Diagnosis:  (INFECTED LEFT TOTAL KNEE W/ SPACER IN PLACE)    Surgeon:  Deangelo Bang MD Responsible Provider:  Zoey Kumar DO    Anesthesia Type:  spinal ASA Status:  3          Anesthesia Type: spinal    Rakesh Phase I: Rakesh Score: 8    Rakesh Phase II:      Last vitals: Reviewed and per EMR flowsheets.        Anesthesia Post Evaluation    Patient location during evaluation: PACU  Patient participation: complete - patient participated  Level of consciousness: awake and alert  Airway patency: patent  Nausea & Vomiting: no nausea and no vomiting  Complications: no  Cardiovascular status: hemodynamically stable  Respiratory status: acceptable  Hydration status: stable

## 2019-09-03 NOTE — CONSULTS
Oral BID    ceFAZolin (ANCEF) IVPB  2 g Intravenous Q8H    rivaroxaban  10 mg Oral Daily    HYDROmorphone         Continuous Infusions:   sodium chloride      dextrose       PRN Meds:docusate sodium, sodium chloride flush, morphine, ondansetron, traMADol, HYDROcodone 5 mg - acetaminophen, glucose, dextrose, glucagon (rDNA), dextrose  Allergies:   ALLERGIES:    Latex; Bee venom; Dilaudid [hydromorphone hcl]; and Adhesive tape        SOCIAL HISTORY:     TOBACCO:   reports that he has never smoked. He has never used smokeless tobacco.     ETOH:   reports that he does not drink alcohol. Patient currently lives with family        FAMILY HISTORY:         Problem Relation Age of Onset    Diabetes Mother     Heart Disease Mother        REVIEW OF SYSTEMS:     Constitutional: no fever, no night sweats, no fatigue. Head: no head ache , no head injury, no migranes. Eye: no blurring of vision, no double vision. Ears: no hearing difficulty, no tinnitus  Mouth/throat: no ulceration, dental caries , dysphagia  Lungs: no cough no chest pain  CVS: no palpitation, no chest pain, no shortness of breath  GI: no abdominal pain, no nausea , no vomiting, no constipation  DARWIN: no dysuria, frequency and urgency, no hematuria, no kidney stones  Musculoskeletal:+joint pain, swelling , stiffness,  Endocrine: no polyuria, polydipsia, no cold or heat intolerance  Hematology: no anemia, no easy brusing or bleeding, no hx of clotting disorder  Dermatology: no skin rash, no eczema, no pruritis,  Psychiatry: no depression, no anxiety,no panic attacks, no suicide ideation    PHYSICAL EXAM:     BP (!) 156/79   Pulse 67   Temp 97.6 °F (36.4 °C) (Axillary)   Resp 12   Ht 6' 3\" (1.905 m)   Wt 259 lb 6.4 oz (117.7 kg)   SpO2 99%   BMI 32.42 kg/m²   General:  Awake, alert, not in distress. HEENT: pink conjunctiva, unicteric sclera, moist oral mucosa. Chest:  Clear   Cardiovascular:  RRR ,S1S2, no murmur or gallop.   Abdomen:  Soft, non tender to palpation. Extremities: left knee dressed post surgical wound  Skin:  Warm and dry. CNS:oriented              Problem list of patient      Patient Active Problem List   Diagnosis Code    Infected orthopedic implant, initial encounter ([facility] [address] T84. 7XXA    Failed total joint replacement ([facility] [address] T84.019A           Impression and Recommendation:   Failed left knee : had second stage revision: continue iv antibiotic. Will continue oral antibiotic due to his immunosuppresion       Thank you Michelle Richter MD for allowing me to participate in this patient's care.     Pete Reid MD,FACP 9/3/2019 4:45 PM

## 2019-09-04 PROBLEM — E11.8 CONTROLLED TYPE 2 DIABETES MELLITUS WITH COMPLICATION, WITH LONG-TERM CURRENT USE OF INSULIN (HCC): Status: ACTIVE | Noted: 2019-09-04

## 2019-09-04 PROBLEM — Z79.4 CONTROLLED TYPE 2 DIABETES MELLITUS WITH COMPLICATION, WITH LONG-TERM CURRENT USE OF INSULIN (HCC): Status: ACTIVE | Noted: 2019-09-04

## 2019-09-04 LAB
AFB SMEAR: NORMAL
ANION GAP SERPL CALCULATED.3IONS-SCNC: 12 MEQ/L (ref 8–16)
BUN BLDV-MCNC: 16 MG/DL (ref 7–22)
CALCIUM SERPL-MCNC: 8.5 MG/DL (ref 8.5–10.5)
CHLORIDE BLD-SCNC: 104 MEQ/L (ref 98–111)
CO2: 22 MEQ/L (ref 23–33)
CREAT SERPL-MCNC: 0.9 MG/DL (ref 0.4–1.2)
ERYTHROCYTE [DISTWIDTH] IN BLOOD BY AUTOMATED COUNT: 14.3 % (ref 11.5–14.5)
ERYTHROCYTE [DISTWIDTH] IN BLOOD BY AUTOMATED COUNT: 44 FL (ref 35–45)
GFR SERPL CREATININE-BSD FRML MDRD: 84 ML/MIN/1.73M2
GLUCOSE BLD-MCNC: 198 MG/DL (ref 70–108)
HCT VFR BLD CALC: 35.7 % (ref 42–52)
HEMOGLOBIN: 11.2 GM/DL (ref 14–18)
MCH RBC QN AUTO: 26.8 PG (ref 26–33)
MCHC RBC AUTO-ENTMCNC: 31.4 GM/DL (ref 32.2–35.5)
MCV RBC AUTO: 85.4 FL (ref 80–94)
PLATELET # BLD: 173 THOU/MM3 (ref 130–400)
PMV BLD AUTO: 10.4 FL (ref 9.4–12.4)
POTASSIUM SERPL-SCNC: 3.9 MEQ/L (ref 3.5–5.2)
RBC # BLD: 4.18 MILL/MM3 (ref 4.7–6.1)
SODIUM BLD-SCNC: 138 MEQ/L (ref 135–145)
WBC # BLD: 7.6 THOU/MM3 (ref 4.8–10.8)

## 2019-09-04 PROCEDURE — 6370000000 HC RX 637 (ALT 250 FOR IP): Performed by: ORTHOPAEDIC SURGERY

## 2019-09-04 PROCEDURE — 97530 THERAPEUTIC ACTIVITIES: CPT

## 2019-09-04 PROCEDURE — 36415 COLL VENOUS BLD VENIPUNCTURE: CPT

## 2019-09-04 PROCEDURE — 97110 THERAPEUTIC EXERCISES: CPT

## 2019-09-04 PROCEDURE — 97162 PT EVAL MOD COMPLEX 30 MIN: CPT

## 2019-09-04 PROCEDURE — 6360000002 HC RX W HCPCS: Performed by: ORTHOPAEDIC SURGERY

## 2019-09-04 PROCEDURE — 6370000000 HC RX 637 (ALT 250 FOR IP): Performed by: PHYSICIAN ASSISTANT

## 2019-09-04 PROCEDURE — 80048 BASIC METABOLIC PNL TOTAL CA: CPT

## 2019-09-04 PROCEDURE — 2580000003 HC RX 258: Performed by: ORTHOPAEDIC SURGERY

## 2019-09-04 PROCEDURE — 97166 OT EVAL MOD COMPLEX 45 MIN: CPT

## 2019-09-04 PROCEDURE — 99222 1ST HOSP IP/OBS MODERATE 55: CPT | Performed by: PHYSICIAN ASSISTANT

## 2019-09-04 PROCEDURE — 85027 COMPLETE CBC AUTOMATED: CPT

## 2019-09-04 PROCEDURE — 1200000000 HC SEMI PRIVATE

## 2019-09-04 RX ORDER — HYDROCODONE BITARTRATE AND ACETAMINOPHEN 5; 325 MG/1; MG/1
2 TABLET ORAL EVERY 4 HOURS PRN
Status: DISCONTINUED | OUTPATIENT
Start: 2019-09-04 | End: 2019-09-06 | Stop reason: HOSPADM

## 2019-09-04 RX ADMIN — Medication 2 G: at 00:10

## 2019-09-04 RX ADMIN — INSULIN LISPRO 4 UNITS: 100 INJECTION, SOLUTION INTRAVENOUS; SUBCUTANEOUS at 21:09

## 2019-09-04 RX ADMIN — INSULIN HUMAN 110 UNITS: 100 INJECTION, SUSPENSION SUBCUTANEOUS at 09:42

## 2019-09-04 RX ADMIN — MORPHINE SULFATE 2 MG: 2 INJECTION, SOLUTION INTRAMUSCULAR; INTRAVENOUS at 03:36

## 2019-09-04 RX ADMIN — TIZANIDINE 2 MG: 4 TABLET ORAL at 09:41

## 2019-09-04 RX ADMIN — METFORMIN HYDROCHLORIDE 500 MG: 500 TABLET ORAL at 09:41

## 2019-09-04 RX ADMIN — Medication 10 ML: at 10:54

## 2019-09-04 RX ADMIN — GABAPENTIN 300 MG: 300 CAPSULE ORAL at 09:41

## 2019-09-04 RX ADMIN — SODIUM CHLORIDE: 9 INJECTION, SOLUTION INTRAVENOUS at 00:10

## 2019-09-04 RX ADMIN — INSULIN LISPRO 2 UNITS: 100 INJECTION, SOLUTION INTRAVENOUS; SUBCUTANEOUS at 16:30

## 2019-09-04 RX ADMIN — Medication 2 G: at 16:28

## 2019-09-04 RX ADMIN — HYDROCODONE BITARTRATE AND ACETAMINOPHEN 1 TABLET: 5; 325 TABLET ORAL at 14:53

## 2019-09-04 RX ADMIN — INSULIN LISPRO 3 UNITS: 100 INJECTION, SOLUTION INTRAVENOUS; SUBCUTANEOUS at 11:46

## 2019-09-04 RX ADMIN — FAMOTIDINE 20 MG: 20 TABLET ORAL at 09:40

## 2019-09-04 RX ADMIN — TRAMADOL HYDROCHLORIDE 50 MG: 50 TABLET, FILM COATED ORAL at 09:40

## 2019-09-04 RX ADMIN — HYDROCODONE BITARTRATE AND ACETAMINOPHEN 1 TABLET: 5; 325 TABLET ORAL at 06:17

## 2019-09-04 RX ADMIN — DOCUSATE SODIUM 100 MG: 100 CAPSULE, LIQUID FILLED ORAL at 21:04

## 2019-09-04 RX ADMIN — PYRIDOSTIGMINE BROMIDE 180 MG: 60 TABLET ORAL at 09:40

## 2019-09-04 RX ADMIN — HYDROCODONE BITARTRATE AND ACETAMINOPHEN 1 TABLET: 5; 325 TABLET ORAL at 10:51

## 2019-09-04 RX ADMIN — TIZANIDINE 2 MG: 4 TABLET ORAL at 21:05

## 2019-09-04 RX ADMIN — METFORMIN HYDROCHLORIDE 500 MG: 500 TABLET ORAL at 16:29

## 2019-09-04 RX ADMIN — HYDROCODONE BITARTRATE AND ACETAMINOPHEN 2 TABLET: 5; 325 TABLET ORAL at 18:55

## 2019-09-04 RX ADMIN — DOCUSATE SODIUM 100 MG: 100 CAPSULE, LIQUID FILLED ORAL at 09:40

## 2019-09-04 RX ADMIN — PRAVASTATIN SODIUM 10 MG: 10 TABLET ORAL at 21:04

## 2019-09-04 RX ADMIN — GABAPENTIN 300 MG: 300 CAPSULE ORAL at 13:50

## 2019-09-04 RX ADMIN — GABAPENTIN 300 MG: 300 CAPSULE ORAL at 21:04

## 2019-09-04 RX ADMIN — Medication 10 ML: at 09:56

## 2019-09-04 RX ADMIN — RIVAROXABAN 10 MG: 10 TABLET, FILM COATED ORAL at 18:56

## 2019-09-04 RX ADMIN — Medication 2 G: at 09:42

## 2019-09-04 RX ADMIN — TIZANIDINE 2 MG: 4 TABLET ORAL at 13:51

## 2019-09-04 RX ADMIN — FAMOTIDINE 20 MG: 20 TABLET ORAL at 21:04

## 2019-09-04 ASSESSMENT — PAIN SCALES - GENERAL
PAINLEVEL_OUTOF10: 6
PAINLEVEL_OUTOF10: 10
PAINLEVEL_OUTOF10: 6
PAINLEVEL_OUTOF10: 9
PAINLEVEL_OUTOF10: 8
PAINLEVEL_OUTOF10: 7
PAINLEVEL_OUTOF10: 4
PAINLEVEL_OUTOF10: 4
PAINLEVEL_OUTOF10: 10
PAINLEVEL_OUTOF10: 4
PAINLEVEL_OUTOF10: 5
PAINLEVEL_OUTOF10: 6
PAINLEVEL_OUTOF10: 10

## 2019-09-04 ASSESSMENT — PAIN DESCRIPTION - FREQUENCY
FREQUENCY: CONTINUOUS
FREQUENCY: INTERMITTENT
FREQUENCY: CONTINUOUS

## 2019-09-04 ASSESSMENT — PAIN DESCRIPTION - PAIN TYPE
TYPE: SURGICAL PAIN

## 2019-09-04 ASSESSMENT — PAIN DESCRIPTION - PROGRESSION
CLINICAL_PROGRESSION: GRADUALLY WORSENING
CLINICAL_PROGRESSION: GRADUALLY WORSENING
CLINICAL_PROGRESSION: NOT CHANGED
CLINICAL_PROGRESSION: GRADUALLY WORSENING
CLINICAL_PROGRESSION: NOT CHANGED
CLINICAL_PROGRESSION: GRADUALLY WORSENING
CLINICAL_PROGRESSION: GRADUALLY WORSENING
CLINICAL_PROGRESSION: GRADUALLY IMPROVING
CLINICAL_PROGRESSION: GRADUALLY WORSENING

## 2019-09-04 ASSESSMENT — PAIN DESCRIPTION - DESCRIPTORS
DESCRIPTORS: ACHING;CONSTANT
DESCRIPTORS: ACHING
DESCRIPTORS: ACHING;BURNING

## 2019-09-04 ASSESSMENT — PAIN DESCRIPTION - LOCATION
LOCATION: KNEE

## 2019-09-04 ASSESSMENT — PAIN DESCRIPTION - ONSET
ONSET: GRADUAL
ONSET: ON-GOING

## 2019-09-04 ASSESSMENT — PAIN DESCRIPTION - ORIENTATION
ORIENTATION: LEFT

## 2019-09-04 ASSESSMENT — PAIN - FUNCTIONAL ASSESSMENT
PAIN_FUNCTIONAL_ASSESSMENT: PREVENTS OR INTERFERES SOME ACTIVE ACTIVITIES AND ADLS

## 2019-09-04 NOTE — CARE COORDINATION
9/4/19, 7:33 AM      Duane Scheiderer       Admitted from: [facility]   9/3/2019/ [place] day: 1   Location: -12/012-A Reason for admit: Failed total joint replacement, sequela [T84.019S] Status: IP  Admit order signed?: yes  PMH:  has a past medical history of Arthritis, Diabetes mellitus ([facility] [address] Enlarged prostate, History of blood transfusion, History of thymectomy, Hyperlipidemia, Myasthenia gravis ([facility] [address] Retention of urine, Sleep apnea, Thyroid disease, and Torn rotator cuff. Procedure: 09/03/19  SECOND STAGE REVISION LEFT TOTAL KNEE by Dr. Mira San  Pertinent abnormal Imaging:  na  Medications:  Scheduled Meds:   insulin lispro  0-6 Units Subcutaneous TID WC    insulin lispro  0-3 Units Subcutaneous Nightly    gabapentin  300 mg Oral TID    insulin NPH  110 Units Subcutaneous QAM    metFORMIN  500 mg Oral BID    pravastatin  10 mg Oral Nightly    tiZANidine  2 mg Oral TID    sodium chloride flush  10 mL Intravenous 2 times per day    docusate sodium  100 mg Oral BID    famotidine  20 mg Oral BID    ceFAZolin (ANCEF) IVPB  2 g Intravenous Q8H    rivaroxaban  10 mg Oral Daily    pyridostigmine  180 mg Oral Daily     Continuous Infusions:   sodium chloride 125 mL/hr at 09/04/19 0010    dextrose        Pertinent Info/Orders/Treatment Plan:  POD #1 -N/V checks, accu checks as ordered with ISS, IV Ancef, PT and OT evals, wound care, remove gaffney catheter, IP consult to hospitalist, has rt mediport,       Diet: DIET GENERAL;   Smoking status:  reports that he has never smoked.  He has never used smokeless tobacco.   PCP: Young Latham DO  Readmission: no  Readmission Risk Score: 9%    Discharge Planning  Current Residence:  Private Residence  Living Arrangements:  Spouse/Significant Other   Support Systems:  [employer], Spouse/Significant Other  Current Services PTA:   none  Potential Assistance Needed:  Justice Sebastian, Alethea PT/OT  Potential Assistance Purchasing

## 2019-09-04 NOTE — PLAN OF CARE
Problem: DISCHARGE BARRIERS  Goal: Patient's continuum of care needs are met  Outcome: Met This Shift  Note:   Pt care needs met this shift. Problem: Falls - Risk of:  Goal: Will remain free from falls  Description  Will remain free from falls  Outcome: Ongoing  Note:   Pt free from falls this shift. Fall prevention measures in place. Problem: Pain:  Goal: Pain level will decrease  Description  Pain level will decrease  Outcome: Ongoing  Note:   Pt report pain at  10/10 on scale. Pt states oral/iv pain medication, ice therapy and repositioning are  helping to achieve pain goal of a 5/10 on scale. Problem: Cardiovascular  Goal: No DVT, peripheral vascular complications  Outcome: Ongoing  Note:   Denies chest pain and calf tenderness. No s/s of DVTs. VS stable. Problem: Respiratory  Goal: No pulmonary complications  Outcome: Ongoing  Note:   No pulmonary complication this shift. Lung sounds clear throughout. Problem: GI  Goal: No bowel complications  Outcome: Ongoing  Note:   No bowel complications this shift. BS hypoactive. Taking prescribed medications to assist with BM       Problem:   Goal: Adequate urinary output  Outcome: Ongoing  Note:   Pt voiding adequate amount of urine this shift. Problem: Nutrition  Goal: Optimal nutrition therapy  Outcome: Ongoing  Note:   Pt on carb control diet. No n/v noted     Problem: Skin Integrity/Risk  Goal: No skin breakdown during hospitalization  Outcome: Ongoing  Note:   Dressing dry and intact. No skin breakdown noted. Pt able to reposition self in bed. Care plan reviewed with patient. Patient verbalizes understanding of the plan of care and contribute to goal setting.

## 2019-09-04 NOTE — PROGRESS NOTES
flush, ondansetron, traMADol, glucose, dextrose, glucagon (rDNA), dextrose, pyridostigmine **AND** pyridostigmine      LABS:     CBC:   Recent Labs     09/04/19  0639   WBC 7.6   HGB 11.2*        BMP:    Recent Labs     09/04/19  0639      K 3.9      CO2 22*   BUN 16   CREATININE 0.9   GLUCOSE 198*     Calcium:  Recent Labs     09/04/19  0639   CALCIUM 8.5           Problem list of patient:     Patient Active Problem List   Diagnosis Code    Infected orthopedic implant, initial encounter ([facility] [address] T84. 7XXA    Failed total joint replacement ([facility] [address] T84.019A    Controlled type 2 diabetes mellitus with complication, with long-term current use of insulin ([facility] E11.8, Z79.4         ASSESSMENT/PLAN   Left knee second stage revision: culture so far negative.  Continue current treatment      Fab Alcantar MD, FACP 9/4/2019 5:47 PM

## 2019-09-04 NOTE — PROGRESS NOTES
[address]  INPATIENT OCCUPATIONAL THERAPY  [facility] ORTHOPEDICS 7K  EVALUATION    Time:    Time In:   Time Out: 1131  Timed Code Treatment Minutes: 15 Minutes  Minutes: 26          Date: 2019  Patient Name: Liu Baxter,   Gender: male      MRN: [id number]  : 1950  (77 y.o.)  Referring Practitioner: Dr. Ariana Hallman   Diagnosis: failed total joint replacement   Additional Pertinent Hx: 76 y.o. male who was admitted to the hospital for second stage revision of the left knee. He was treated with iv antibiotics 6 weeks @ SNF. INFECTED LEFT TOTAL KNEE W/ SPACER IN PLACE. SECOND STAGE REVISION LEFT TOTAL KNEE. L TKA s/p 9/3/19     Restrictions/Precautions:  Restrictions/Precautions: Weight Bearing, Fall Risk, General Precautions, Femoral Block  Date and Time: 9/3/19 @ 1315. Expires 9/5/15 @ 1315  Left Lower Extremity Weight Bearing: Weight Bearing As Tolerated  Position Activity Restriction  Other position/activity restrictions: L hip ER, pillow to lateral thigh to decrease ER. Subjective  Chart Reviewed: Yes, History and Physical, Orders, Progress Notes, Operative Notes  Patient assessed for rehabilitation services?: Yes    Subjective: Pt lyingi n bed initially refusing any therapy d/t incrased pain in left quad area. Pt then agreable to EOB tasks.      Pain:  Pain Assessment  Patient Currently in Pain: Yes  Pain Assessment: 0-10  Pain Level: 10  Pain Type: Surgical pain  Pain Location: Knee(nd into quad muscle)  Pain Orientation: Left  Pain Descriptors: Aching  Pain Frequency: Continuous    Social/Functional History:  Lives With: Spouse(father in law abd brother in law)  Type of Home: House  Home Layout: One level  Home Access: Stairs to enter without rails  Entrance Stairs - Number of Steps: 3  Home Equipment: 4 wheeled walker, Crutches, Standard walker, Reacher   Bathroom Shower/Tub: Walk-in shower  Bathroom Toilet: Handicap height  Bathroom Accessibility: Accessible    Receives Help

## 2019-09-04 NOTE — CARE COORDINATION
DISCHARGE BARRIERS  9/4/19, 1:14 PM    Reason for Referral: discharge needs   Mental Status:  Alert, oriented   Decision Making:  Makes own decisions   Family/Social/Home Environment:  Duane lives at home with his wife. He has been independent with his care, and has support from family. Duane hopes to return home at discharge, but will consider ecf if needed  Current Services: outpt PT at [facility]  Current Equipment: walker   Payment Source: medicare  Concerns or Barriers to Discharge: hopes to return home, will consider ecf if necessary   Collabrative List of ECF/HH were provided: declined, prefers [facility]     Teach Back Method used with patient  regarding care plan and discharge plan  Patient  verbalizes understanding of the plan of care and contributes  to goal setting. Anticipated Needs/Discharge Plan:  Spoke with Duane about discharge plan. He hopes to return home with his wife, with outpt PT. If he needs ecf, he prefers [facility], and he is agreeable to referral.   Referral made to [facility]. Facility will continue to review, depending on needs at discharge.       Electronically signed by MAXI Goodman on 9/4/2019 at 1:14 PM

## 2019-09-04 NOTE — PROGRESS NOTES
level  Home Access: Stairs to enter without rails  Entrance Stairs - Number of Steps: 3  Home Equipment: 4 wheeled walker, Crutches, Standard walker, Reacher     Bathroom Shower/Tub: Walk-in shower  Bathroom Toilet: Handicap height  Bathroom Accessibility: Accessible    Receives Help From: Family  ADL Assistance: Independent     Ambulation Assistance: Independent  Transfer Assistance: Independent    Active : Yes  Occupation: Retired  Additional Comments: SNF 6 weeks for IV antibiotics and home for 2 weeks prior to return for L TKA. Ambulating with rolator PTA. Pt stating he was able to complete his own ADL tasks but if he did have any difficulty his spouse would assist him    OBJECTIVE:  Range of Motion:  Right Lower Extremity: WFL  Left Lower Extremity: Impaired - knee AAROM 18-72 degrees    Strength:  Right Lower Extremity: WFL  Left Lower Extremity: Impaired - 2-/5 grossly    Balance:  Static Sitting Balance:  Stand By Assistance, Sat EOB ~3 min  Static Standing Balance: Contact Guard Assistance  Dynamic Standing Balance: Contact Guard Assistance    Bed Mobility:  Rolling to Right: Minimal Assistance, with increased time for completion, MIN A with L LE   Supine to Sit: Minimal Assistance, with increased time for completion, MIN A with L LE  Scooting: Contact Guard Assistance, with verbal cues , with increased time for completion    Transfers:  Sit to Stand: [employer], with increased time for completion, with verbal cues, From EOB and toilet. Verbal cueing required for proper transfer  Stand to [place] [address], with increased time for completion, with verbal cues, To toilet and recliner. Verbal cues for proper positioning and arm placement. Ambulation:  Minimal Assistance, with cues for safety, with verbal cues , with increased time for completion  Distance: 5' X 1, 15' X 2  Surface: Level Tile  Device:Rolling Walker  Gait Deviations:   Forward Flexed Posture, Slow Callie,

## 2019-09-04 NOTE — OP NOTE
assisted throughout the  procedure with positioning, draping, retraction, wound closure,  dressing, and splint application.         Cassia Dyson M.D.    D: 09/03/2019 12:13:17       T: 09/03/2019 13:22:59     [email or web address]  Job#: [id number]     Doc#: [id number]    CC:

## 2019-09-05 PROCEDURE — 6370000000 HC RX 637 (ALT 250 FOR IP): Performed by: PHYSICIAN ASSISTANT

## 2019-09-05 PROCEDURE — 97110 THERAPEUTIC EXERCISES: CPT

## 2019-09-05 PROCEDURE — 2580000003 HC RX 258: Performed by: ORTHOPAEDIC SURGERY

## 2019-09-05 PROCEDURE — 6370000000 HC RX 637 (ALT 250 FOR IP): Performed by: ORTHOPAEDIC SURGERY

## 2019-09-05 PROCEDURE — 6360000002 HC RX W HCPCS: Performed by: ORTHOPAEDIC SURGERY

## 2019-09-05 PROCEDURE — 99232 SBSQ HOSP IP/OBS MODERATE 35: CPT | Performed by: INTERNAL MEDICINE

## 2019-09-05 PROCEDURE — 1200000000 HC SEMI PRIVATE

## 2019-09-05 PROCEDURE — 97116 GAIT TRAINING THERAPY: CPT

## 2019-09-05 PROCEDURE — 97535 SELF CARE MNGMENT TRAINING: CPT

## 2019-09-05 PROCEDURE — 97530 THERAPEUTIC ACTIVITIES: CPT

## 2019-09-05 PROCEDURE — 6370000000 HC RX 637 (ALT 250 FOR IP): Performed by: INTERNAL MEDICINE

## 2019-09-05 RX ORDER — CEPHALEXIN 500 MG/1
500 CAPSULE ORAL 3 TIMES DAILY
Qty: 42 CAPSULE | Refills: 0 | Status: SHIPPED | OUTPATIENT
Start: 2019-09-05 | End: 2019-09-06 | Stop reason: SDUPTHER

## 2019-09-05 RX ORDER — HYDROCODONE BITARTRATE AND ACETAMINOPHEN 5; 325 MG/1; MG/1
2 TABLET ORAL EVERY 4 HOURS PRN
Qty: 42 TABLET | Refills: 0 | Status: SHIPPED | OUTPATIENT
Start: 2019-09-05 | End: 2019-09-12

## 2019-09-05 RX ADMIN — METFORMIN HYDROCHLORIDE 500 MG: 500 TABLET ORAL at 17:06

## 2019-09-05 RX ADMIN — FAMOTIDINE 20 MG: 20 TABLET ORAL at 08:38

## 2019-09-05 RX ADMIN — Medication 2 G: at 00:06

## 2019-09-05 RX ADMIN — GABAPENTIN 300 MG: 300 CAPSULE ORAL at 08:36

## 2019-09-05 RX ADMIN — HYDROCODONE BITARTRATE AND ACETAMINOPHEN 2 TABLET: 5; 325 TABLET ORAL at 21:42

## 2019-09-05 RX ADMIN — TIZANIDINE 2 MG: 4 TABLET ORAL at 15:44

## 2019-09-05 RX ADMIN — HYDROCODONE BITARTRATE AND ACETAMINOPHEN 1 TABLET: 5; 325 TABLET ORAL at 05:02

## 2019-09-05 RX ADMIN — INSULIN LISPRO 4 UNITS: 100 INJECTION, SOLUTION INTRAVENOUS; SUBCUTANEOUS at 08:42

## 2019-09-05 RX ADMIN — Medication 2 G: at 15:42

## 2019-09-05 RX ADMIN — TRAMADOL HYDROCHLORIDE 50 MG: 50 TABLET, FILM COATED ORAL at 06:13

## 2019-09-05 RX ADMIN — TIZANIDINE 2 MG: 4 TABLET ORAL at 21:42

## 2019-09-05 RX ADMIN — DOCUSATE SODIUM 100 MG: 100 CAPSULE, LIQUID FILLED ORAL at 21:42

## 2019-09-05 RX ADMIN — FAMOTIDINE 20 MG: 20 TABLET ORAL at 21:42

## 2019-09-05 RX ADMIN — INSULIN LISPRO 6 UNITS: 100 INJECTION, SOLUTION INTRAVENOUS; SUBCUTANEOUS at 11:58

## 2019-09-05 RX ADMIN — METFORMIN HYDROCHLORIDE 500 MG: 500 TABLET ORAL at 08:35

## 2019-09-05 RX ADMIN — GABAPENTIN 300 MG: 300 CAPSULE ORAL at 15:41

## 2019-09-05 RX ADMIN — DOCUSATE SODIUM 100 MG: 100 CAPSULE, LIQUID FILLED ORAL at 08:37

## 2019-09-05 RX ADMIN — PYRIDOSTIGMINE BROMIDE 180 MG: 60 TABLET ORAL at 08:36

## 2019-09-05 RX ADMIN — GABAPENTIN 300 MG: 300 CAPSULE ORAL at 21:42

## 2019-09-05 RX ADMIN — RIVAROXABAN 10 MG: 10 TABLET, FILM COATED ORAL at 17:06

## 2019-09-05 RX ADMIN — PRAVASTATIN SODIUM 10 MG: 10 TABLET ORAL at 21:42

## 2019-09-05 RX ADMIN — HYDROCODONE BITARTRATE AND ACETAMINOPHEN 2 TABLET: 5; 325 TABLET ORAL at 10:47

## 2019-09-05 RX ADMIN — Medication 2 G: at 08:32

## 2019-09-05 RX ADMIN — INSULIN LISPRO 1 UNITS: 100 INJECTION, SOLUTION INTRAVENOUS; SUBCUTANEOUS at 21:30

## 2019-09-05 RX ADMIN — HYDROCODONE BITARTRATE AND ACETAMINOPHEN 1 TABLET: 5; 325 TABLET ORAL at 00:06

## 2019-09-05 RX ADMIN — TIZANIDINE 2 MG: 4 TABLET ORAL at 08:35

## 2019-09-05 RX ADMIN — INSULIN HUMAN 110 UNITS: 100 INJECTION, SUSPENSION SUBCUTANEOUS at 08:42

## 2019-09-05 RX ADMIN — HYDROCODONE BITARTRATE AND ACETAMINOPHEN 2 TABLET: 5; 325 TABLET ORAL at 15:42

## 2019-09-05 RX ADMIN — Medication 10 ML: at 21:45

## 2019-09-05 ASSESSMENT — PAIN DESCRIPTION - PROGRESSION
CLINICAL_PROGRESSION: NOT CHANGED
CLINICAL_PROGRESSION: NOT CHANGED

## 2019-09-05 ASSESSMENT — PAIN SCALES - GENERAL
PAINLEVEL_OUTOF10: 2
PAINLEVEL_OUTOF10: 5
PAINLEVEL_OUTOF10: 5
PAINLEVEL_OUTOF10: 6
PAINLEVEL_OUTOF10: 4
PAINLEVEL_OUTOF10: 7
PAINLEVEL_OUTOF10: 4
PAINLEVEL_OUTOF10: 2
PAINLEVEL_OUTOF10: 2
PAINLEVEL_OUTOF10: 4
PAINLEVEL_OUTOF10: 7
PAINLEVEL_OUTOF10: 8
PAINLEVEL_OUTOF10: 4

## 2019-09-05 ASSESSMENT — PAIN DESCRIPTION - FREQUENCY
FREQUENCY: INTERMITTENT
FREQUENCY: CONTINUOUS

## 2019-09-05 ASSESSMENT — PAIN DESCRIPTION - ONSET
ONSET: ON-GOING
ONSET: ON-GOING

## 2019-09-05 ASSESSMENT — PAIN DESCRIPTION - DESCRIPTORS
DESCRIPTORS: ACHING
DESCRIPTORS: ACHING

## 2019-09-05 ASSESSMENT — PAIN DESCRIPTION - ORIENTATION
ORIENTATION: LEFT
ORIENTATION: LEFT

## 2019-09-05 ASSESSMENT — PAIN DESCRIPTION - PAIN TYPE
TYPE: SURGICAL PAIN
TYPE: SURGICAL PAIN

## 2019-09-05 ASSESSMENT — PAIN DESCRIPTION - LOCATION
LOCATION: KNEE
LOCATION: KNEE

## 2019-09-05 NOTE — PROGRESS NOTES
Patients dressing was changed today. Patient has zip tie closure on his left knee. Incision is clean, dry, and intact. No drainage or odor present. Incision painted with betadine, and covered with 2 ABD pads. Cloth sleeve placed around knee to keep in place. Patient states no further questions/complaints at this time.

## 2019-09-05 NOTE — PROGRESS NOTES
[address] [facility]  Occupational Therapy  Daily Note  Time:    Time In: 1119  Time Out: 1142  Timed Code Treatment Minutes: 23 Minutes  Minutes: 23          Date: 2019  Patient Name: Abigail Chaudhari,   Gender: male      Room: -12/012-A  MRN: [id number]  : 1950  (77 y.o.)  Referring Practitioner: Dr. Mima Pierre   Diagnosis: failed total joint replacement   Additional Pertinent Hx: 76 y.o. male who was admitted to the hospital for second stage revision of the left knee. He was treated with iv antibiotics 6 weeks @ SNF. INFECTED LEFT TOTAL KNEE W/ SPACER IN PLACE. SECOND STAGE REVISION LEFT TOTAL KNEE. L TKA s/p 9/3/19     Restrictions/Precautions:  Restrictions/Precautions: Weight Bearing, Fall Risk, General Precautions, Femoral Block  Date and Time: 9/3/19 @ 1315. Expires 9/5/15 @ 1315  Left Lower Extremity Weight Bearing: Weight Bearing As Tolerated  Required Braces or Orthoses  Left Lower Extremity Brace: Knee Immobilizer(X 48 hours post block or until block wears off)  Position Activity Restriction  Other position/activity restrictions: L hip ER, pillow to lateral thigh to decrease ER. SUBJECTIVE:  Cooperative, talkative    PAIN: no number given/10: mild pain in LLE    COGNITION: WFL    ADL:   Grooming: Supervision. stood at sink x 3 min for brushing teeth. Education on foot placement to prevent ER of L hip & twisting L knee in standing         BALANCE:  Standing Balance: Supervision    TRANSFERS:  Sit to Stand:  Stand By Assistance. FUNCTIONAL MOBILITY:  Assistive Device: Roll-A-Bout  Assist Level:  Stand By Assistance. Distance: To and from bathroom  + 100ft in hallway         ASSESSMENT:  Activity Tolerance:  Patient tolerance of  treatment: good.         Discharge Recommendations: Home with assist PRN  Equipment Recommendations: Equipment Needed: No  Plan: Times per week: 6x  Current Treatment Recommendations: Strengthening, Functional Mobility Training,

## 2019-09-05 NOTE — PROGRESS NOTES
with functional mobility. Pt given HEP handout for seated LE exercises. Stairs:  Contact Guard Assistance  Number of Steps: 4  Height: 6\" step with One Handrail (R). Pt navigated 1 platform step CGA with rolator. ASSESSMENT:  Assessment: Patient progressing toward established goals. Activity Tolerance:  Patient tolerance of  treatment: good. Pt ambulated to restroom, ambulated in [place], navigated steps, and performed stimulated car transfer prior to ambulating back to room and performing seated exercises in the recliner. Pt motivated to work with therapy in order to go home. PT to continue to increase strength and functional mobility. Equipment Recommendations:Equipment Needed: No  Discharge Recommendations: Home with Home health PT, Home with assist PRN(Wife home to help with care)    Plan: Times per week: BID 6 X O, 1 X QD  Current Treatment Recommendations: Strengthening, Functional Mobility Training, Home Exercise Program, Safety Education & Training, Gait Training, Transfer Training, Stair training, ROM    Patient Education  Patient Education: Home Exercise Program, Gait, Stairs, Car Transfers    Goals:  Patient goals : to go home  Short term goals  Time Frame for Short term goals: by discharge  Short term goal 1: Pt to perform bed mobility SBA to enable entry/exit of bed  Short term goal 2: Pt to transfer sit <> stand SBA to increase functional mobility  Short term goal 3: Pt to ambulate >100 feet with RW SBA to facilitate community mobility  Short term goal 4: Pt to navigate 3 steps without HR SBA to enable entry/exit of home  Short term goal 5: Pt to perform car transfer SBA to enable entry/exit of vehicle following D/C  Long term goals  Time Frame for Long term goals : N/A secondary to short ELOS. Following session, patient left in safe position with all fall risk precautions in place.

## 2019-09-05 NOTE — PLAN OF CARE
Problem: Falls - Risk of:  Goal: Will remain free from falls  Description  Will remain free from falls  9/5/2019 0212 by Clover Dakin, RN  Outcome: Ongoing  Note:   Patient using call light appropriately to call for assistance. Patient is compliant with use of non-skid slippers. Patient reports understanding of fall prevention when discussed. Bed alarm is in place. Problem: Pain:  Goal: Pain level will decrease  Description  Pain level will decrease  9/5/2019 0212 by Clover Dakin, RN  Outcome: Ongoing  Note:   Patient reports pain at 4 on scale. Patient states oral medication helping to achieve pain goal of a 2 on scale. Patient repositioned for comfort and pillows used for support. Problem: Cardiovascular  Goal: No DVT, peripheral vascular complications  5/6/4978 0150 by Clover Dakin, RN  Outcome: Ongoing  Note:   Patient without s/s of DVT. Patient able to wear jose hose and SCDs to help prevent development of DVT. Problem: Respiratory  Goal: No pulmonary complications  6/2/5621 0069 by Clover Dakin, RN  Outcome: Ongoing  Note:   Patient is 96% on room air, denies any shortness of breath, and lung sounds are clear. Problem: GI  Goal: No bowel complications  6/3/7081 6589 by Clover Dakin, RN  Outcome: Ongoing  Note:   Patient with bowel sounds, passing flatus, and without nausea. Taking prescribed medications to assist with bowel movement. Patient states he usually goes every three days at home. Problem:   Goal: Adequate urinary output  9/5/2019 0212 by Clover Dakin, RN  Outcome: Ongoing  Note:   Patient has adequate urinary output at this time. Urine is yellow and clear. Frequency noted per patient, denies any burning upon urination. Problem: Nutrition  Goal: Optimal nutrition therapy  9/5/2019 0212 by Clover Dakin, RN  Outcome: Ongoing  Note:   Patient states he has a good appetite, denies any nausea at this time, and is taking in adequate fluid intake.      Problem: Skin

## 2019-09-05 NOTE — PROGRESS NOTES
Progress note: Infectious diseases    Patient - Joshua Soto,  Age - 76 y.o.    - 1950      Room Number - 7K-12/012-A   MRN -  [id number]   Acct # - [de-identified]  Date of Admission -  9/3/2019  7:07 AM    SUBJECTIVE:   He has no new issues. culture from the knee is negative. OBJECTIVE   VITALS    height is 6' 3\" (1.905 m) and weight is 259 lb 6.4 oz (117.7 kg). His oral temperature is 98.1 °F (36.7 °C). His blood pressure is 138/63 and his pulse is 71. His respiration is 16 and oxygen saturation is 99%. Wt Readings from Last 3 Encounters:   19 259 lb 6.4 oz (117.7 kg)   19 264 lb 15.9 oz (120.2 kg)   19 265 lb (120.2 kg)       I/O (24 Hours)    Intake/Output Summary (Last 24 hours) at 2019 1649  Last data filed at 2019 1424  Gross per 24 hour   Intake 1610 ml   Output 1100 ml   Net 510 ml       General Appearance  Awake, alert, oriented,  not  In acute distress  HEENT - normocephalic, atraumatic,    Neck - Supple, no mass  Lungs -  Bilateral   air entry, no rhonchi, no wheeze.   Cardiovascular - Heart sounds are normal.   Abdomen - soft, not distended, nontender,   Neurologic -oriented  Skin - No bruising or bleeding  Extremities - dressed left knee, there is swelling of the knee, no drainage    MEDICATIONS:      insulin lispro  0-12 Units Subcutaneous TID WC    insulin lispro  0-6 Units Subcutaneous Nightly    gabapentin  300 mg Oral TID    insulin NPH  110 Units Subcutaneous QAM    metFORMIN  500 mg Oral BID    pravastatin  10 mg Oral Nightly    tiZANidine  2 mg Oral TID    sodium chloride flush  10 mL Intravenous 2 times per day    docusate sodium  100 mg Oral BID    famotidine  20 mg Oral BID    ceFAZolin (ANCEF) IVPB  2 g Intravenous Q8H    rivaroxaban  10 mg Oral Daily    pyridostigmine  180 mg Oral Daily      sodium chloride 125 mL/hr at 19 0010    dextrose

## 2019-09-05 NOTE — CARE COORDINATION
9/5/19, 10:38 AM       [place] day: 2  Location: [facility]12/012- Reason for admit: Failed total joint replacement, sequela [T84.019S]   Procedure: 9/3/19 surgery by Dr Anthony Torres: [address] of Care: PT/OT. Pain management. Dr Sariah Flanagan for antibiotics plan. Following surgical cultures. PCP: Rajiv Matute DO  Readmission Risk Score: 10%  Discharge Plan: Dr Anthony Torres planning to discharge home - possibly tomorrow if passes PT/OT and Dr Sariah Flanagan determines antibiotics plan. Dr Anthony Torres said that he plans for discharge to home at least before Monday appt in [place] [facility] [employer] for IV infusion appt in [place] [facility] (For myasthenia gravis)  Has DME. Planning OPPT at [facility]. Needed IV antibiotics as inpatient at [facility] in past.   I called our [place] to check benefits for home IV infusion. Await answer. 1120 Update : [facility] confirmed that he has NO benefits for HOME IV antibiotics.

## 2019-09-06 VITALS
WEIGHT: 259.4 LBS | RESPIRATION RATE: 16 BRPM | TEMPERATURE: 98.1 F | SYSTOLIC BLOOD PRESSURE: 137 MMHG | HEIGHT: 75 IN | OXYGEN SATURATION: 100 % | BODY MASS INDEX: 32.25 KG/M2 | HEART RATE: 96 BPM | DIASTOLIC BLOOD PRESSURE: 75 MMHG

## 2019-09-06 PROCEDURE — 97110 THERAPEUTIC EXERCISES: CPT

## 2019-09-06 PROCEDURE — 6370000000 HC RX 637 (ALT 250 FOR IP): Performed by: PHYSICIAN ASSISTANT

## 2019-09-06 PROCEDURE — 99231 SBSQ HOSP IP/OBS SF/LOW 25: CPT | Performed by: INTERNAL MEDICINE

## 2019-09-06 PROCEDURE — 97116 GAIT TRAINING THERAPY: CPT

## 2019-09-06 PROCEDURE — 2580000003 HC RX 258: Performed by: ORTHOPAEDIC SURGERY

## 2019-09-06 PROCEDURE — 97530 THERAPEUTIC ACTIVITIES: CPT

## 2019-09-06 PROCEDURE — 6370000000 HC RX 637 (ALT 250 FOR IP): Performed by: INTERNAL MEDICINE

## 2019-09-06 PROCEDURE — 6360000002 HC RX W HCPCS: Performed by: ORTHOPAEDIC SURGERY

## 2019-09-06 PROCEDURE — 2709999900 HC NON-CHARGEABLE SUPPLY

## 2019-09-06 PROCEDURE — 97535 SELF CARE MNGMENT TRAINING: CPT

## 2019-09-06 PROCEDURE — 6370000000 HC RX 637 (ALT 250 FOR IP): Performed by: ORTHOPAEDIC SURGERY

## 2019-09-06 RX ORDER — CEPHALEXIN 500 MG/1
500 CAPSULE ORAL 4 TIMES DAILY
Qty: 360 CAPSULE | Refills: 0 | Status: SHIPPED | OUTPATIENT
Start: 2019-09-06 | End: 2019-12-05

## 2019-09-06 RX ADMIN — METFORMIN HYDROCHLORIDE 500 MG: 500 TABLET ORAL at 09:50

## 2019-09-06 RX ADMIN — FAMOTIDINE 20 MG: 20 TABLET ORAL at 09:49

## 2019-09-06 RX ADMIN — Medication 2 G: at 00:30

## 2019-09-06 RX ADMIN — HYDROCODONE BITARTRATE AND ACETAMINOPHEN 2 TABLET: 5; 325 TABLET ORAL at 09:49

## 2019-09-06 RX ADMIN — GABAPENTIN 300 MG: 300 CAPSULE ORAL at 09:50

## 2019-09-06 RX ADMIN — INSULIN LISPRO 2 UNITS: 100 INJECTION, SOLUTION INTRAVENOUS; SUBCUTANEOUS at 09:50

## 2019-09-06 RX ADMIN — Medication 2 G: at 14:12

## 2019-09-06 RX ADMIN — HEPARIN 500 UNITS: 100 SYRINGE at 15:14

## 2019-09-06 RX ADMIN — DOCUSATE SODIUM 100 MG: 100 CAPSULE, LIQUID FILLED ORAL at 09:49

## 2019-09-06 RX ADMIN — PYRIDOSTIGMINE BROMIDE 180 MG: 60 TABLET ORAL at 09:49

## 2019-09-06 RX ADMIN — TIZANIDINE 2 MG: 4 TABLET ORAL at 09:50

## 2019-09-06 RX ADMIN — INSULIN HUMAN 110 UNITS: 100 INJECTION, SUSPENSION SUBCUTANEOUS at 09:51

## 2019-09-06 ASSESSMENT — PAIN SCALES - GENERAL
PAINLEVEL_OUTOF10: 7
PAINLEVEL_OUTOF10: 3
PAINLEVEL_OUTOF10: 7
PAINLEVEL_OUTOF10: 3

## 2019-09-06 ASSESSMENT — PAIN DESCRIPTION - ONSET
ONSET: ON-GOING
ONSET: ON-GOING

## 2019-09-06 ASSESSMENT — PAIN DESCRIPTION - ORIENTATION
ORIENTATION: LEFT

## 2019-09-06 ASSESSMENT — PAIN DESCRIPTION - LOCATION
LOCATION: KNEE

## 2019-09-06 ASSESSMENT — PAIN - FUNCTIONAL ASSESSMENT
PAIN_FUNCTIONAL_ASSESSMENT: PREVENTS OR INTERFERES SOME ACTIVE ACTIVITIES AND ADLS

## 2019-09-06 ASSESSMENT — PAIN DESCRIPTION - PAIN TYPE
TYPE: SURGICAL PAIN

## 2019-09-06 ASSESSMENT — PAIN DESCRIPTION - FREQUENCY
FREQUENCY: INTERMITTENT

## 2019-09-06 ASSESSMENT — PAIN DESCRIPTION - DESCRIPTORS
DESCRIPTORS: ACHING

## 2019-09-06 ASSESSMENT — PAIN DESCRIPTION - PROGRESSION
CLINICAL_PROGRESSION: NOT CHANGED

## 2019-09-06 NOTE — PROGRESS NOTES
Postop appearance left knee. **This report has been created using voice recognition software. It may contain minor errors which are inherent in voice recognition technology. ** Final report electronically signed by Dr. Merrick Cardoza on 9/3/2019 1:13 PM    Xr Knee Left (1-2 Views)    Result Date: 9/3/2019  PROCEDURE: XR KNEE LEFT (1-2 VIEWS) CLINICAL INFORMATION: osteoarthritis. Knee prosthesis insertion. COMPARISON: No prior study. TECHNIQUE: Mobile AP and crossfire lateral views of the left knee were obtained. FINDINGS: A total knee prosthesis has been inserted. The prosthetic components are normally related. Soft tissue swelling and irregularity are seen at the operative site anteriorly along with subcutaneous air following recent surgery. . No free bone fragment is seen at the operative site. .. Postop appearance left knee. **This report has been created using voice recognition software. It may contain minor errors which are inherent in voice recognition technology. ** Final report electronically signed by Dr. Merrick Cardoza on 9/3/2019 11:34 AM      DVT prophylaxis: ?  Heparin                              Disposition:    Per Orthopedic Surgery

## 2019-09-06 NOTE — PROGRESS NOTES
dextrose, pyridostigmine **AND** pyridostigmine      LABS:     CBC:   Recent Labs     09/04/19  0639   WBC 7.6   HGB 11.2*        BMP:    Recent Labs     09/04/19  0639      K 3.9      CO2 22*   BUN 16   CREATININE 0.9   GLUCOSE 198*     Calcium:  Recent Labs     09/04/19  0639   CALCIUM 8.5           Problem list of patient:     Patient Active Problem List   Diagnosis Code    Infected orthopedic implant, initial encounter ([facility] T84. 7XXA    Failed total joint replacement ([facility] T84.019A    Controlled type 2 diabetes mellitus with complication, with long-term current use of insulin ([facility]) E11.8, Z79.4         ASSESSMENT/PLAN   Left knee second stage revision: culture so far negative. Explained to patient  Ok with discharge plan       Addendum: on of the cultures is growing staph warneri, oxacillin sensitive: he will be on prolonged oral antibiotic: keflex Q6 hrs  Follow up in 4wks.  At the wound clinic    Pete Reid MD, Gladys Ibrahim 9/6/2019 8:38 AM

## 2019-09-06 NOTE — DISCHARGE INSTR - DIET

## 2019-09-06 NOTE — PROGRESS NOTES
Marcell Cornejo [address] [facility]  Occupational Therapy  Daily Note  Time:   Time In: 910  Time Out: 933  Timed Code Treatment Minutes: 23 Minutes  Minutes: 23          Date: 2019  Patient Name: Joseluis Carranza,   Gender: male      Room: [facility]12/012-A  MRN: [id number]  : 1950  (77 y.o.)  Referring Practitioner: Dr. Gris Julian   Diagnosis: failed total joint replacement   Additional Pertinent Hx: 76 y.o. male who was admitted to the hospital for second stage revision of the left knee. He was treated with iv antibiotics 6 weeks @ SNF. INFECTED LEFT TOTAL KNEE W/ SPACER IN PLACE. SECOND STAGE REVISION LEFT TOTAL KNEE. L TKA s/p 9/3/19     Restrictions/Precautions:  Restrictions/Precautions: Weight Bearing, Fall Risk, General Precautions, Femoral Block  Date and Time: 9/3/19 @ 1315. Expires 9/5/15 @ 1315  Left Lower Extremity Weight Bearing: Weight Bearing As Tolerated  Required Braces or Orthoses  Left Lower Extremity Brace: Knee Immobilizer(X 48 hours post block or until block wears off)  Position Activity Restriction  Other position/activity restrictions: L hip ER, pillow to lateral thigh to decrease ER. SUBJECTIVE: pt up in shower when arrived. Pt agreeable for OT to assist.  Pt has poor safety and does what he wants to do and how he wants to do it     PAIN: pt did not state any pain during session     COGNITION: Decreased Insight, Decreased Problem Solving and Decreased Safety Awareness    ADL:   Bathing: Contact Guard Assistance and with verbal cues . safety, pt has poor safety and does what he wants to do   Upper Extremity [address] and with verbal cues . did in standing and didnt want rollabout in front of him he is very stuck in doing how he wants to do unsafe   Lower Extremity [address], Minimal Assistance, with verbal cues  and with increased time for completion.   pt able to get shorts on sitting on STS then when pt sit EOB he bathroom & with retrieval of ADL items with mod I & 0 vcs for walker safety  Long term goals  Time Frame for Long term goals : not est d/t ELOS     Following session, patient left in safe position with all fall risk precautions in place.

## 2019-09-06 NOTE — PROGRESS NOTES
0930: This RN received call that the patient removed his accessed mediport; this RN informed the RN calling that we will need to re-access the patient and flush 500 units of heparin, then de-access. This RN awaiting call that heparin is ordered and ready. 1430: This RN re-accessed the patient. 1515: This RN flushed the patient's mediport with heparin, and de-accessed his mediport.

## 2019-09-06 NOTE — PROGRESS NOTES
Pt given discharge instructions with permission to allow wife to remain in room during teaching obtained. Multiple rx given. Follow up advised. All personal belongings sent with pt to car via wheelchair.

## 2019-09-06 NOTE — FLOWSHEET NOTE
09/05/19 2100   Encounter Summary   Services provided to: Patient   Referral/Consult From: [facility] Spouse; Children;Family members; Religion/leanna community   Place of [address] [place] [address] Energy Completed   Continue Visiting Yes  (9/5/19 continue support)   Complexity of Encounter Moderate   Length of Encounter 15 minutes   Routine   Type Initial   Assessment Approachable   Intervention Active listening;Nurtured hope;Prayer;Discussed meaning/purpose;Discussed relationship with God;Discussed illness/injury and it's impact   Outcome Expressed gratitude;Engaged in conversation   Spiritual/Yazidi   Type Spiritual support     Pt is a 76year old male. Patient engaged in conversation during this visit. Patient shared with this  that he is feeling much better since his surgery was successful. Patient told this  that he had knee surgery and have had over ten surgeries in the last ten years. Patient said that when he was admitted his pain level was very high. Patient has deep leanna in God and told this  that God has brought him through many crisis including this one. Patient said he has a goal which is to help people give their lives to [employer]. Patient is  and both patient and wife are members of [facility] in [facility].  provided active listening and nurtured hope.  offered emotional and spiritual support.  will follow up for continue support as needed.

## 2019-09-08 LAB
AEROBIC CULTURE: ABNORMAL
AEROBIC CULTURE: NORMAL
ANAEROBIC CULTURE: ABNORMAL
ANAEROBIC CULTURE: NORMAL
GRAM STAIN RESULT: ABNORMAL
GRAM STAIN RESULT: NORMAL
ORGANISM: ABNORMAL

## 2019-10-02 ENCOUNTER — HOSPITAL ENCOUNTER (OUTPATIENT)
Dept: WOUND CARE | Age: 69
Discharge: HOME | End: 2019-10-02
Payer: MEDICARE

## 2019-10-02 VITALS
HEIGHT: 75 IN | DIASTOLIC BLOOD PRESSURE: 66 MMHG | WEIGHT: 259 LBS | TEMPERATURE: 98.1 F | OXYGEN SATURATION: 97 % | BODY MASS INDEX: 32.2 KG/M2 | RESPIRATION RATE: 16 BRPM | SYSTOLIC BLOOD PRESSURE: 141 MMHG | HEART RATE: 68 BPM

## 2019-10-02 DIAGNOSIS — T84.7XXA INFECTED ORTHOPEDIC IMPLANT, INITIAL ENCOUNTER (HCC): Primary | ICD-10-CM

## 2019-10-02 PROCEDURE — 99212 OFFICE O/P EST SF 10 MIN: CPT

## 2019-10-02 ASSESSMENT — PAIN SCALES - GENERAL: PAINLEVEL_OUTOF10: 0

## 2019-10-14 LAB — MISC REFERENCE: NORMAL

## 2019-10-15 LAB
FUNGUS IDENTIFIED: ABNORMAL
FUNGUS IDENTIFIED: ABNORMAL
FUNGUS SMEAR: ABNORMAL
ORGANISM: ABNORMAL

## 2019-10-23 ENCOUNTER — HOSPITAL ENCOUNTER (OUTPATIENT)
Dept: WOUND CARE | Age: 69
Discharge: HOME | End: 2019-10-23
Payer: MEDICARE

## 2019-10-23 ENCOUNTER — TELEPHONE (OUTPATIENT)
Dept: WOUND CARE | Age: 69
End: 2019-10-23

## 2019-10-23 VITALS
BODY MASS INDEX: 32.2 KG/M2 | SYSTOLIC BLOOD PRESSURE: 131 MMHG | OXYGEN SATURATION: 98 % | DIASTOLIC BLOOD PRESSURE: 71 MMHG | HEIGHT: 75 IN | WEIGHT: 259 LBS | HEART RATE: 71 BPM | TEMPERATURE: 97.8 F | RESPIRATION RATE: 18 BRPM

## 2019-10-23 DIAGNOSIS — B39.4 HISTOPLASMA CAPSULATUM INFECTION: Primary | ICD-10-CM

## 2019-10-23 PROCEDURE — 99212 OFFICE O/P EST SF 10 MIN: CPT

## 2019-10-23 RX ORDER — ITRACONAZOLE 100 MG/1
200 CAPSULE ORAL 2 TIMES DAILY
Qty: 168 CAPSULE | Refills: 0 | Status: SHIPPED | OUTPATIENT
Start: 2019-10-23 | End: 2019-12-04

## 2019-11-25 ENCOUNTER — HOSPITAL ENCOUNTER (OUTPATIENT)
Dept: GENERAL RADIOLOGY | Age: 69
Discharge: HOME | End: 2019-11-25
Payer: MEDICARE

## 2019-11-25 ENCOUNTER — HOSPITAL ENCOUNTER (OUTPATIENT)
Age: 69
Discharge: HOME | End: 2019-11-25
Payer: MEDICARE

## 2019-11-25 DIAGNOSIS — B39.4 HISTOPLASMA CAPSULATUM INFECTION: ICD-10-CM

## 2019-11-25 LAB
ANION GAP SERPL CALCULATED.3IONS-SCNC: 12 MEQ/L (ref 8–16)
BUN BLDV-MCNC: 29 MG/DL (ref 7–22)
CALCIUM SERPL-MCNC: 9.3 MG/DL (ref 8.5–10.5)
CHLORIDE BLD-SCNC: 101 MEQ/L (ref 98–111)
CO2: 24 MEQ/L (ref 23–33)
CREAT SERPL-MCNC: 1 MG/DL (ref 0.4–1.2)
GFR SERPL CREATININE-BSD FRML MDRD: 74 ML/MIN/1.73M2
GLUCOSE BLD-MCNC: 144 MG/DL (ref 70–108)
POTASSIUM SERPL-SCNC: 4 MEQ/L (ref 3.5–5.2)
SODIUM BLD-SCNC: 137 MEQ/L (ref 135–145)

## 2019-11-25 PROCEDURE — 87385 HISTOPLASMA CAPSUL AG IA: CPT

## 2019-11-25 PROCEDURE — 36415 COLL VENOUS BLD VENIPUNCTURE: CPT

## 2019-11-25 PROCEDURE — 84154 ASSAY OF PSA FREE: CPT

## 2019-11-25 PROCEDURE — 80048 BASIC METABOLIC PNL TOTAL CA: CPT

## 2019-11-25 PROCEDURE — 71045 X-RAY EXAM CHEST 1 VIEW: CPT

## 2019-11-25 PROCEDURE — 84153 ASSAY OF PSA TOTAL: CPT

## 2019-11-27 ENCOUNTER — HOSPITAL ENCOUNTER (OUTPATIENT)
Dept: WOUND CARE | Age: 69
Discharge: HOME | End: 2019-11-27
Payer: MEDICARE

## 2019-11-27 VITALS
TEMPERATURE: 97.7 F | HEIGHT: 75 IN | RESPIRATION RATE: 16 BRPM | SYSTOLIC BLOOD PRESSURE: 131 MMHG | HEART RATE: 62 BPM | WEIGHT: 270 LBS | DIASTOLIC BLOOD PRESSURE: 60 MMHG | OXYGEN SATURATION: 99 % | BODY MASS INDEX: 33.57 KG/M2

## 2019-11-27 DIAGNOSIS — T84.7XXA INFECTED ORTHOPEDIC IMPLANT, INITIAL ENCOUNTER (HCC): Primary | ICD-10-CM

## 2019-11-27 PROCEDURE — 99212 OFFICE O/P EST SF 10 MIN: CPT

## 2019-11-27 ASSESSMENT — PAIN DESCRIPTION - PAIN TYPE: TYPE: ACUTE PAIN

## 2019-11-27 ASSESSMENT — PAIN DESCRIPTION - ORIENTATION: ORIENTATION: RIGHT

## 2019-11-27 ASSESSMENT — PAIN DESCRIPTION - PROGRESSION: CLINICAL_PROGRESSION: GRADUALLY WORSENING

## 2019-11-27 ASSESSMENT — PAIN SCALES - GENERAL: PAINLEVEL_OUTOF10: 10

## 2019-11-27 ASSESSMENT — PAIN DESCRIPTION - LOCATION: LOCATION: SHOULDER

## 2019-11-27 ASSESSMENT — PAIN DESCRIPTION - ONSET: ONSET: ON-GOING

## 2019-11-27 ASSESSMENT — PAIN DESCRIPTION - FREQUENCY: FREQUENCY: CONTINUOUS

## 2019-11-27 ASSESSMENT — PAIN DESCRIPTION - DESCRIPTORS: DESCRIPTORS: SHARP

## 2019-11-28 LAB — PROSTATE SPECIFIC ANTIGEN FREE: NORMAL

## 2019-12-02 LAB — MISC. #1 REFERENCE GROUP TEST: NORMAL

## 2019-12-09 ENCOUNTER — HOSPITAL ENCOUNTER (OUTPATIENT)
Age: 69
Discharge: HOME | End: 2019-12-09
Payer: MEDICARE

## 2019-12-09 LAB
BASOPHILS # BLD: 0.6 %
BASOPHILS ABSOLUTE: 0 THOU/MM3 (ref 0–0.1)
EOSINOPHIL # BLD: 3.6 %
EOSINOPHILS ABSOLUTE: 0.2 THOU/MM3 (ref 0–0.4)
ERYTHROCYTE [DISTWIDTH] IN BLOOD BY AUTOMATED COUNT: 19.3 % (ref 11.5–14.5)
ERYTHROCYTE [DISTWIDTH] IN BLOOD BY AUTOMATED COUNT: 60.9 FL (ref 35–45)
HCT VFR BLD CALC: 38.6 % (ref 42–52)
HEMOGLOBIN: 12.5 GM/DL (ref 14–18)
IMMATURE GRANS (ABS): 0.02 THOU/MM3 (ref 0–0.07)
IMMATURE GRANULOCYTES: 0.4 %
LYMPHOCYTES # BLD: 20 %
LYMPHOCYTES ABSOLUTE: 1.1 THOU/MM3 (ref 1–4.8)
MCH RBC QN AUTO: 28.9 PG (ref 26–33)
MCHC RBC AUTO-ENTMCNC: 32.4 GM/DL (ref 32.2–35.5)
MCV RBC AUTO: 89.1 FL (ref 80–94)
MONOCYTES # BLD: 7.4 %
MONOCYTES ABSOLUTE: 0.4 THOU/MM3 (ref 0.4–1.3)
NUCLEATED RED BLOOD CELLS: 0 /100 WBC
PLATELET # BLD: 232 THOU/MM3 (ref 130–400)
PMV BLD AUTO: 11.4 FL (ref 9.4–12.4)
RBC # BLD: 4.33 MILL/MM3 (ref 4.7–6.1)
SEG NEUTROPHILS: 68 %
SEGMENTED NEUTROPHILS ABSOLUTE COUNT: 3.6 THOU/MM3 (ref 1.8–7.7)
WBC # BLD: 5.3 THOU/MM3 (ref 4.8–10.8)

## 2019-12-09 PROCEDURE — 85025 COMPLETE CBC W/AUTO DIFF WBC: CPT

## 2019-12-09 PROCEDURE — 36415 COLL VENOUS BLD VENIPUNCTURE: CPT

## 2020-01-22 ENCOUNTER — HOSPITAL ENCOUNTER (OUTPATIENT)
Dept: WOUND CARE | Age: 70
Discharge: HOME | End: 2020-01-22
Payer: MEDICARE

## 2020-01-22 VITALS
SYSTOLIC BLOOD PRESSURE: 109 MMHG | DIASTOLIC BLOOD PRESSURE: 58 MMHG | HEART RATE: 74 BPM | BODY MASS INDEX: 33.57 KG/M2 | OXYGEN SATURATION: 98 % | RESPIRATION RATE: 16 BRPM | WEIGHT: 270 LBS | TEMPERATURE: 96.6 F | HEIGHT: 75 IN

## 2020-01-22 PROCEDURE — 99211 OFF/OP EST MAY X REQ PHY/QHP: CPT

## 2020-01-22 ASSESSMENT — PAIN SCALES - GENERAL: PAINLEVEL_OUTOF10: 0

## 2020-01-22 NOTE — PROGRESS NOTES
[facility]          Progress Note and Procedure Note      Duane Scheiderer  MEDICAL RECORD NUMBER:  [id number]  AGE: 71 y.o. GENDER: male  : 1950  EPISODE DATE:  2020    Subjective:     SUBJECTIVE     Chief Complaint   Patient presents with    Follow-up     antibiotic and antifungal therapy           HISTORY OF PRESENT ILLNESS   He was seen for follow-up visit. 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 Since his last visit he had right shoulder surgery he has no other new complaints. He is currently on treatment with [employer] and IVIG to treat myasthenia gravis.     PAST MEDICAL HISTORY         Diagnosis Date    Arthritis     Diabetes mellitus ([address]     Enlarged prostate     History of blood transfusion     History of thymectomy     Hyperlipidemia     [facility]ia [facility]     Retention of urine     Sleep apnea     does not use CPAP    Thyroid disease     Torn rotator cuff 2019    right         PAST SURGICAL HISTORY     Past Surgical History:   Procedure Laterality Date    APPENDECTOMY      BACK SURGERY      x6    CARPAL TUNNEL RELEASE      CHOLECYSTECTOMY      JOINT REPLACEMENT Left     hip and knee    REVISION TOTAL KNEE ARTHROPLASTY Left 2019    FIRST STAGE REVISION LEFT TOTAL KNEE ARTHROPLASTY, ORIF LEFT PATELLA performed by Sally Bernheim, MD at [address] Left 9/3/2019    SECOND STAGE REVISION LEFT TOTAL KNEE performed by Sally Bernheim, MD at [address] Right 2020    THYMECTOMY      THYROIDECTOMY, PARTIAL       FAMILY HISTORY         Family History   Problem Relation Age of Onset    Diabetes Mother     Heart Disease Mother      SOCIAL HISTORY       Social History     Tobacco Use    Smoking status: Never Smoker    Smokeless tobacco: Never Used   Substance Use Topics    Alcohol use: Never     Frequency: Never    Drug use: Never     ALLERGIES Left knee incision- healed     Follow up visit:   as needed with any new symptoms that occur    Keep next scheduled appointment. Please give 24 hour notice if unable to keep appointment. [phone]     If you experience any of the following, please call the Wound Care Service during business hours: Monday through Friday 8:00 am - 4:30 pm  ([phone]).               *Increase in pain              *Temperature over 101              *Increase in drainage from your wound or a foul odor              *Uncontrolled swelling              *Need for compression bandage changes due to slippage, breakthrough drainage     If you need medical attention outside of business hours, please contact your Primary Care Doctor or go to the nearest emergency room.         Electronically signed by Lucinda Davidson MD on 1/22/2020 at 12:10 PM

## 2020-01-22 NOTE — PLAN OF CARE
Patient presents to wound clinic for follow up of left knee infection. No s/s of infection. Patient afebrile. No open wounds noted. Pt discharged from wound clinic. Finished with antibiotic. Care plan reviewed with patient and spouse. Patient and spouse verbalize understanding of the plan of care and contribute to goal setting.

## 2024-07-10 ENCOUNTER — LAB (OUTPATIENT)
Age: 74
End: 2024-07-10

## 2024-07-10 LAB
ALBUMIN SERPL BCG-MCNC: 4.1 G/DL (ref 3.5–5.1)
ALP SERPL-CCNC: 62 U/L (ref 38–126)
ALT SERPL W/O P-5'-P-CCNC: 17 U/L (ref 11–66)
ANION GAP SERPL CALC-SCNC: 8 MEQ/L (ref 8–16)
AST SERPL-CCNC: 18 U/L (ref 5–40)
BASOPHILS ABSOLUTE: 0 THOU/MM3 (ref 0–0.1)
BASOPHILS NFR BLD AUTO: 0.7 %
BILIRUB SERPL-MCNC: 0.5 MG/DL (ref 0.3–1.2)
BUN SERPL-MCNC: 22 MG/DL (ref 7–22)
CALCIUM SERPL-MCNC: 9.5 MG/DL (ref 8.5–10.5)
CHLORIDE SERPL-SCNC: 106 MEQ/L (ref 98–111)
CHOLEST SERPL-MCNC: 113 MG/DL (ref 100–199)
CO2 SERPL-SCNC: 25 MEQ/L (ref 23–33)
CREAT SERPL-MCNC: 0.9 MG/DL (ref 0.4–1.2)
CREAT UR-MCNC: 190.6 MG/DL
DEPRECATED MEAN GLUCOSE BLD GHB EST-ACNC: 174 MG/DL (ref 70–126)
DEPRECATED RDW RBC AUTO: 45.7 FL (ref 35–45)
EOSINOPHIL NFR BLD AUTO: 8.8 %
EOSINOPHILS ABSOLUTE: 0.5 THOU/MM3 (ref 0–0.4)
ERYTHROCYTE [DISTWIDTH] IN BLOOD BY AUTOMATED COUNT: 13.5 % (ref 11.5–14.5)
GFR SERPL CREATININE-BSD FRML MDRD: 90 ML/MIN/1.73M2
GLUCOSE SERPL-MCNC: 126 MG/DL (ref 70–108)
HBA1C MFR BLD HPLC: 7.8 % (ref 4.4–6.4)
HCT VFR BLD AUTO: 40.2 % (ref 42–52)
HDLC SERPL-MCNC: 48 MG/DL
HGB BLD-MCNC: 13.1 GM/DL (ref 14–18)
IMM GRANULOCYTES # BLD AUTO: 0.01 THOU/MM3 (ref 0–0.07)
IMM GRANULOCYTES NFR BLD AUTO: 0.2 %
LDLC SERPL CALC-MCNC: 49 MG/DL
LYMPHOCYTES ABSOLUTE: 1.1 THOU/MM3 (ref 1–4.8)
LYMPHOCYTES NFR BLD AUTO: 19.5 %
MCH RBC QN AUTO: 30.7 PG (ref 26–33)
MCHC RBC AUTO-ENTMCNC: 32.6 GM/DL (ref 32.2–35.5)
MCV RBC AUTO: 94.1 FL (ref 80–94)
MICROALBUMIN UR-MCNC: < 1.2 MG/DL
MICROALBUMIN/CREAT RATIO PNL UR: 6 MG/G (ref 0–30)
MONOCYTES ABSOLUTE: 0.4 THOU/MM3 (ref 0.4–1.3)
MONOCYTES NFR BLD AUTO: 7.6 %
NEUTROPHILS ABSOLUTE: 3.5 THOU/MM3 (ref 1.8–7.7)
NEUTROPHILS NFR BLD AUTO: 63.2 %
NRBC BLD AUTO-RTO: 0 /100 WBC
PLATELET # BLD AUTO: 185 THOU/MM3 (ref 130–400)
PMV BLD AUTO: 12 FL (ref 9.4–12.4)
POTASSIUM SERPL-SCNC: 4.7 MEQ/L (ref 3.5–5.2)
PROT SERPL-MCNC: 6.2 G/DL (ref 6.1–8)
RBC # BLD AUTO: 4.27 MILL/MM3 (ref 4.7–6.1)
SODIUM SERPL-SCNC: 139 MEQ/L (ref 135–145)
TRIGL SERPL-MCNC: 78 MG/DL (ref 0–199)
WBC # BLD AUTO: 5.5 THOU/MM3 (ref 4.8–10.8)

## 2024-10-22 NOTE — H&P
[address] [address]                            PREOPERATIVE H&P      PATIENT NAME: SCHEIDERER, DUANE             : 1950  MED REC NO: [id number]                       ROOM:   ACCOUNT NO: [id number]                       ADMIT DATE: 2024  PROVIDER: EDUARDO Sheffield      DIAGNOSES:    1. Right hip degenerative joint disease.  2. Status post 2-stage revision of left knee.  3. Chronic obstructive pulmonary disease.  4. Diabetes.  5. Sleep apnea with use of CPAP machine.    CURRENT MEDICATIONS:  Eculizumab, tamsulosin, finasteride, ropinirole, metformin, Novolin, amlodipine, vitamin D2, fluoxetine.    ALLERGIES:  DILAUDID, ADHESIVE TAPE, AND WELLBUTRIN.      PAST SURGICAL HISTORY:  Left knee, left hip, lumbar x6, sinus surgery, cholecystectomy, pancreatitis, 2-stage revision of left knee.    FAMILY HISTORY:  Father with arthritis, CVA, coronary artery disease.    SOCIAL HISTORY:  Nonsmoker.    REVIEW OF SYSTEMS:  Positive for right hip catching, popping, locking pain and stiffness.  Negative for fevers, chills, abdominal pain, chest pain, or shortness of breath.    HISTORY OF PRESENT ILLNESS:  The patient is a pleasant gentleman who started having increasing pain in his right hip.  He is previously a patient of Dr. 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  He has done quite well, but unfortunately he suffers from quite a bit of comorbidities, lumbar surgery x6, and has neuropathy.  He has had a previous left total hip replacement in remote past, which he has done well with, but he is having increasing pain now on his right hip.  Having difficulty bending down tying shoes, crossing his legs, getting in and out of chairs, walking tolerance has decreased secondary to his hip pain.  He sees a neurologist in [place].  Poorly controlled diabetes.  He is on insulin pump.  He tries to keep his A1c

## 2024-11-05 NOTE — PROGRESS NOTES
PAT Call Date: attempted 11/5   Surgery Date: 11/12    Surgeon: Lang   Surgery: rt hip    Any Isolation Precautions? No      Type of Isolation Precaution: Not Applicable   Is patient from a nursing home? No  Name of Nursing Home:   Any equipment assist needed for moving patient? No   Type of Equipment: Not Applicable  Patient last weight: 270lb     Hard Copy on Chart  In EPIC Pending/Notes   Consent -   Within 30 days; signed, dated & timed by patient and physician     [] On Arrival     [] Blood      [] DNR   H&P -   Within 30 days  10/29  [] Physician To Do     Clearance -   Check fax   []Medical     []Cardiac     [] Pulmonary    Orders -   Signed and Dated    10/29  [] Physician To Do    Labs -   Within 3 months  Check fax   [] CBC    [] BMP   [] GFR   [] INR    [] PTT    [] Urine    [] Liver Enzymes    [] MRSA Nasal      Others:     Radiology Studies -   Within 1 year  6/27  [x] Chest X-Ray-ok   [] MRI    [] CT    [] Vascular   [] US     Pulmonary -     [x] LENCHO   [x] CPAP     Cardiac Workup -   Stress Test, Echo, Cath within 18 months  12/11  [x] EKG in care everywhere      [] Cath                 [] Stress Test                      [] Echo/ATTILA   [] CABG   [] Holter Monitor      [] Pacemaker/ICD        Brand:        Where does patient have checked:         Last check:         Rep Notified:

## 2024-11-05 NOTE — PROGRESS NOTES
Left message for FERNIE Nuñez of Dr. Croft, to enquire about doing PAT visit for this patient.  Await response.

## 2024-11-12 ENCOUNTER — HOSPITAL ENCOUNTER (OUTPATIENT)
Age: 74
Discharge: HOME HEALTH | End: 2024-11-15
Attending: ORTHOPAEDIC SURGERY | Admitting: ORTHOPAEDIC SURGERY
Payer: MEDICARE

## 2024-11-12 ENCOUNTER — ANESTHESIA EVENT (OUTPATIENT)
Dept: OPERATING ROOM | Age: 74
End: 2024-11-12
Payer: MEDICARE

## 2024-11-12 ENCOUNTER — ANESTHESIA (OUTPATIENT)
Dept: OPERATING ROOM | Age: 74
End: 2024-11-12
Payer: MEDICARE

## 2024-11-12 ENCOUNTER — APPOINTMENT (OUTPATIENT)
Dept: GENERAL RADIOLOGY | Age: 74
End: 2024-11-12
Attending: ORTHOPAEDIC SURGERY
Payer: MEDICARE

## 2024-11-12 DIAGNOSIS — M16.11 PRIMARY OSTEOARTHRITIS OF RIGHT HIP: Primary | ICD-10-CM

## 2024-11-12 LAB
ABO: NORMAL
ANTIBODY SCREEN: NORMAL
GLUCOSE BLD STRIP.AUTO-MCNC: 137 MG/DL (ref 70–108)
INR PPP: 0.96 (ref 0.85–1.13)
RH FACTOR: NORMAL

## 2024-11-12 PROCEDURE — 6360000002 HC RX W HCPCS: Performed by: STUDENT IN AN ORGANIZED HEALTH CARE EDUCATION/TRAINING PROGRAM

## 2024-11-12 PROCEDURE — 6360000002 HC RX W HCPCS: Performed by: ORTHOPAEDIC SURGERY

## 2024-11-12 PROCEDURE — 2709999900 HC NON-CHARGEABLE SUPPLY: Performed by: ORTHOPAEDIC SURGERY

## 2024-11-12 PROCEDURE — 86850 RBC ANTIBODY SCREEN: CPT

## 2024-11-12 PROCEDURE — 2580000003 HC RX 258: Performed by: ORTHOPAEDIC SURGERY

## 2024-11-12 PROCEDURE — 85610 PROTHROMBIN TIME: CPT

## 2024-11-12 PROCEDURE — 7100000011 HC PHASE II RECOVERY - ADDTL 15 MIN: Performed by: ORTHOPAEDIC SURGERY

## 2024-11-12 PROCEDURE — 36415 COLL VENOUS BLD VENIPUNCTURE: CPT

## 2024-11-12 PROCEDURE — 7100000010 HC PHASE II RECOVERY - FIRST 15 MIN: Performed by: ORTHOPAEDIC SURGERY

## 2024-11-12 PROCEDURE — C1776 JOINT DEVICE (IMPLANTABLE): HCPCS | Performed by: ORTHOPAEDIC SURGERY

## 2024-11-12 PROCEDURE — 7100000001 HC PACU RECOVERY - ADDTL 15 MIN: Performed by: ORTHOPAEDIC SURGERY

## 2024-11-12 PROCEDURE — 6370000000 HC RX 637 (ALT 250 FOR IP): Performed by: ORTHOPAEDIC SURGERY

## 2024-11-12 PROCEDURE — 64447 NJX AA&/STRD FEMORAL NRV IMG: CPT | Performed by: STUDENT IN AN ORGANIZED HEALTH CARE EDUCATION/TRAINING PROGRAM

## 2024-11-12 PROCEDURE — 86900 BLOOD TYPING SEROLOGIC ABO: CPT

## 2024-11-12 PROCEDURE — 6360000002 HC RX W HCPCS: Performed by: NURSE ANESTHETIST, CERTIFIED REGISTERED

## 2024-11-12 PROCEDURE — 3700000000 HC ANESTHESIA ATTENDED CARE: Performed by: ORTHOPAEDIC SURGERY

## 2024-11-12 PROCEDURE — 86901 BLOOD TYPING SEROLOGIC RH(D): CPT

## 2024-11-12 PROCEDURE — 73502 X-RAY EXAM HIP UNI 2-3 VIEWS: CPT

## 2024-11-12 PROCEDURE — 2500000003 HC RX 250 WO HCPCS: Performed by: NURSE ANESTHETIST, CERTIFIED REGISTERED

## 2024-11-12 PROCEDURE — 3600000004 HC SURGERY LEVEL 4 BASE: Performed by: ORTHOPAEDIC SURGERY

## 2024-11-12 PROCEDURE — 2500000003 HC RX 250 WO HCPCS: Performed by: ORTHOPAEDIC SURGERY

## 2024-11-12 PROCEDURE — 7100000000 HC PACU RECOVERY - FIRST 15 MIN: Performed by: ORTHOPAEDIC SURGERY

## 2024-11-12 PROCEDURE — 2580000003 HC RX 258: Performed by: PHYSICIAN ASSISTANT

## 2024-11-12 PROCEDURE — 6370000000 HC RX 637 (ALT 250 FOR IP): Performed by: PHYSICIAN ASSISTANT

## 2024-11-12 PROCEDURE — 6360000002 HC RX W HCPCS

## 2024-11-12 PROCEDURE — 82948 REAGENT STRIP/BLOOD GLUCOSE: CPT

## 2024-11-12 PROCEDURE — 3700000001 HC ADD 15 MINUTES (ANESTHESIA): Performed by: ORTHOPAEDIC SURGERY

## 2024-11-12 PROCEDURE — 3600000014 HC SURGERY LEVEL 4 ADDTL 15MIN: Performed by: ORTHOPAEDIC SURGERY

## 2024-11-12 DEVICE — HIP H1 TOT STD COCR HD IMPL CAPPED H1 SN: Type: IMPLANTABLE DEVICE | Site: HIP | Status: FUNCTIONAL

## 2024-11-12 DEVICE — SYNERGY POROUS HIGH OFFSET FEMORAL                                    COMPONENT SZ 15
Type: IMPLANTABLE DEVICE | Site: HIP | Status: FUNCTIONAL
Brand: SYNERGY

## 2024-11-12 DEVICE — R3 0 HOLE ACETABULAR SHELL 54MM
Type: IMPLANTABLE DEVICE | Site: HIP | Status: FUNCTIONAL
Brand: R3 ACETABULAR

## 2024-11-12 DEVICE — R3 20 DEGREE XLPE ACETABULAR LINER                                    36MM INNER DIAMETER X OUTER DIAMETER 54MM
Type: IMPLANTABLE DEVICE | Site: HIP | Status: FUNCTIONAL
Brand: R3

## 2024-11-12 DEVICE — COBALT CHROME 12/14 TAPER FEMORAL                                    HEAD 36MM +0: Type: IMPLANTABLE DEVICE | Site: HIP | Status: FUNCTIONAL

## 2024-11-12 DEVICE — REFLECTION THREADED HOLE COVER
Type: IMPLANTABLE DEVICE | Site: HIP | Status: FUNCTIONAL
Brand: REFLECTION

## 2024-11-12 RX ORDER — CELECOXIB 100 MG/1
100 CAPSULE ORAL ONCE
Status: COMPLETED | OUTPATIENT
Start: 2024-11-12 | End: 2024-11-12

## 2024-11-12 RX ORDER — FENTANYL CITRATE 50 UG/ML
50 INJECTION, SOLUTION INTRAMUSCULAR; INTRAVENOUS EVERY 5 MIN PRN
Status: COMPLETED | OUTPATIENT
Start: 2024-11-12 | End: 2024-11-12

## 2024-11-12 RX ORDER — SODIUM CHLORIDE 0.9 % (FLUSH) 0.9 %
5-40 SYRINGE (ML) INJECTION PRN
Status: DISCONTINUED | OUTPATIENT
Start: 2024-11-12 | End: 2024-11-12 | Stop reason: HOSPADM

## 2024-11-12 RX ORDER — PYRIDOSTIGMINE BROMIDE 60 MG/1
60 TABLET ORAL 3 TIMES DAILY PRN
Status: DISCONTINUED | OUTPATIENT
Start: 2024-11-12 | End: 2024-11-15 | Stop reason: HOSPADM

## 2024-11-12 RX ORDER — PHENYLEPHRINE HCL IN 0.9% NACL 1 MG/10 ML
SYRINGE (ML) INTRAVENOUS
Status: DISCONTINUED | OUTPATIENT
Start: 2024-11-12 | End: 2024-11-12 | Stop reason: SDUPTHER

## 2024-11-12 RX ORDER — MYCOPHENOLATE MOFETIL 250 MG/1
1000 CAPSULE ORAL 2 TIMES DAILY
Status: DISCONTINUED | OUTPATIENT
Start: 2024-11-12 | End: 2024-11-15 | Stop reason: HOSPADM

## 2024-11-12 RX ORDER — ONDANSETRON 2 MG/ML
INJECTION INTRAMUSCULAR; INTRAVENOUS
Status: DISCONTINUED | OUTPATIENT
Start: 2024-11-12 | End: 2024-11-12 | Stop reason: SDUPTHER

## 2024-11-12 RX ORDER — GABAPENTIN 300 MG/1
900 CAPSULE ORAL 3 TIMES DAILY
Status: ON HOLD | COMMUNITY
End: 2024-11-12 | Stop reason: ALTCHOICE

## 2024-11-12 RX ORDER — RAVULIZUMAB 300 MG/30ML
SOLUTION, CONCENTRATE INTRAVENOUS
COMMUNITY

## 2024-11-12 RX ORDER — ATORVASTATIN CALCIUM 40 MG/1
40 TABLET, FILM COATED ORAL DAILY
COMMUNITY

## 2024-11-12 RX ORDER — ONDANSETRON 2 MG/ML
4 INJECTION INTRAMUSCULAR; INTRAVENOUS EVERY 6 HOURS PRN
Status: DISCONTINUED | OUTPATIENT
Start: 2024-11-12 | End: 2024-11-15 | Stop reason: HOSPADM

## 2024-11-12 RX ORDER — FINASTERIDE 5 MG/1
5 TABLET, FILM COATED ORAL DAILY
Status: DISCONTINUED | OUTPATIENT
Start: 2024-11-12 | End: 2024-11-15 | Stop reason: HOSPADM

## 2024-11-12 RX ORDER — ROPIVACAINE HYDROCHLORIDE 5 MG/ML
INJECTION, SOLUTION EPIDURAL; INFILTRATION; PERINEURAL
Status: DISCONTINUED | OUTPATIENT
Start: 2024-11-12 | End: 2024-11-12 | Stop reason: SDUPTHER

## 2024-11-12 RX ORDER — TAMSULOSIN HYDROCHLORIDE 0.4 MG/1
0.4 CAPSULE ORAL DAILY
Status: DISCONTINUED | OUTPATIENT
Start: 2024-11-12 | End: 2024-11-15 | Stop reason: HOSPADM

## 2024-11-12 RX ORDER — GLYCOPYRROLATE 0.2 MG/ML
INJECTION INTRAMUSCULAR; INTRAVENOUS
Status: DISCONTINUED | OUTPATIENT
Start: 2024-11-12 | End: 2024-11-12 | Stop reason: SDUPTHER

## 2024-11-12 RX ORDER — FLUOXETINE 40 MG/1
40 CAPSULE ORAL DAILY
COMMUNITY

## 2024-11-12 RX ORDER — PYRIDOSTIGMINE BROMIDE 60 MG/1
120 TABLET ORAL EVERY 6 HOURS SCHEDULED
Status: DISCONTINUED | OUTPATIENT
Start: 2024-11-12 | End: 2024-11-12 | Stop reason: SDUPTHER

## 2024-11-12 RX ORDER — ASPIRIN 325 MG
325 TABLET, DELAYED RELEASE (ENTERIC COATED) ORAL 2 TIMES DAILY
Status: DISCONTINUED | OUTPATIENT
Start: 2024-11-12 | End: 2024-11-15 | Stop reason: HOSPADM

## 2024-11-12 RX ORDER — CYCLOBENZAPRINE HCL 10 MG
10 TABLET ORAL EVERY 12 HOURS PRN
Status: DISCONTINUED | OUTPATIENT
Start: 2024-11-12 | End: 2024-11-15 | Stop reason: HOSPADM

## 2024-11-12 RX ORDER — MORPHINE SULFATE 2 MG/ML
2 INJECTION, SOLUTION INTRAMUSCULAR; INTRAVENOUS
Status: ACTIVE | OUTPATIENT
Start: 2024-11-12 | End: 2024-11-13

## 2024-11-12 RX ORDER — FAMOTIDINE 20 MG/1
20 TABLET, FILM COATED ORAL 2 TIMES DAILY
COMMUNITY

## 2024-11-12 RX ORDER — TAMSULOSIN HYDROCHLORIDE 0.4 MG/1
0.4 CAPSULE ORAL DAILY
COMMUNITY

## 2024-11-12 RX ORDER — AMLODIPINE BESYLATE 5 MG/1
5 TABLET ORAL DAILY
Status: DISCONTINUED | OUTPATIENT
Start: 2024-11-12 | End: 2024-11-15 | Stop reason: HOSPADM

## 2024-11-12 RX ORDER — SODIUM CHLORIDE 9 MG/ML
INJECTION, SOLUTION INTRAVENOUS PRN
Status: DISCONTINUED | OUTPATIENT
Start: 2024-11-12 | End: 2024-11-15 | Stop reason: HOSPADM

## 2024-11-12 RX ORDER — TRANEXAMIC ACID 100 MG/ML
INJECTION, SOLUTION INTRAVENOUS
Status: DISCONTINUED | OUTPATIENT
Start: 2024-11-12 | End: 2024-11-12 | Stop reason: SDUPTHER

## 2024-11-12 RX ORDER — SODIUM CHLORIDE 0.9 % (FLUSH) 0.9 %
5-40 SYRINGE (ML) INJECTION EVERY 12 HOURS SCHEDULED
Status: DISCONTINUED | OUTPATIENT
Start: 2024-11-12 | End: 2024-11-15 | Stop reason: HOSPADM

## 2024-11-12 RX ORDER — SODIUM CHLORIDE 0.9 % (FLUSH) 0.9 %
5-40 SYRINGE (ML) INJECTION EVERY 12 HOURS SCHEDULED
Status: DISCONTINUED | OUTPATIENT
Start: 2024-11-12 | End: 2024-11-12 | Stop reason: HOSPADM

## 2024-11-12 RX ORDER — FENTANYL CITRATE 50 UG/ML
INJECTION, SOLUTION INTRAMUSCULAR; INTRAVENOUS
Status: COMPLETED
Start: 2024-11-12 | End: 2024-11-12

## 2024-11-12 RX ORDER — FINASTERIDE 5 MG/1
5 TABLET, FILM COATED ORAL DAILY
COMMUNITY

## 2024-11-12 RX ORDER — POLYETHYLENE GLYCOL 3350 17 G/17G
17 POWDER, FOR SOLUTION ORAL DAILY
Status: DISCONTINUED | OUTPATIENT
Start: 2024-11-12 | End: 2024-11-15 | Stop reason: HOSPADM

## 2024-11-12 RX ORDER — SODIUM CHLORIDE 9 MG/ML
INJECTION, SOLUTION INTRAVENOUS CONTINUOUS
Status: DISCONTINUED | OUTPATIENT
Start: 2024-11-12 | End: 2024-11-12 | Stop reason: HOSPADM

## 2024-11-12 RX ORDER — SODIUM CHLORIDE 9 MG/ML
INJECTION, SOLUTION INTRAVENOUS PRN
Status: DISCONTINUED | OUTPATIENT
Start: 2024-11-12 | End: 2024-11-12 | Stop reason: HOSPADM

## 2024-11-12 RX ORDER — PROPOFOL 10 MG/ML
INJECTION, EMULSION INTRAVENOUS
Status: DISCONTINUED | OUTPATIENT
Start: 2024-11-12 | End: 2024-11-12 | Stop reason: SDUPTHER

## 2024-11-12 RX ORDER — DIPHENHYDRAMINE HYDROCHLORIDE 50 MG/ML
12.5 INJECTION INTRAMUSCULAR; INTRAVENOUS
Status: DISCONTINUED | OUTPATIENT
Start: 2024-11-12 | End: 2024-11-12 | Stop reason: HOSPADM

## 2024-11-12 RX ORDER — ERGOCALCIFEROL 1.25 MG/1
50000 CAPSULE, LIQUID FILLED ORAL WEEKLY
COMMUNITY

## 2024-11-12 RX ORDER — ROCURONIUM BROMIDE 10 MG/ML
INJECTION, SOLUTION INTRAVENOUS
Status: DISCONTINUED | OUTPATIENT
Start: 2024-11-12 | End: 2024-11-12 | Stop reason: SDUPTHER

## 2024-11-12 RX ORDER — AMLODIPINE BESYLATE 5 MG/1
5 TABLET ORAL DAILY
COMMUNITY

## 2024-11-12 RX ORDER — PYRIDOSTIGMINE BROMIDE 60 MG/1
180 TABLET ORAL EVERY MORNING
Status: DISCONTINUED | OUTPATIENT
Start: 2024-11-13 | End: 2024-11-15 | Stop reason: HOSPADM

## 2024-11-12 RX ORDER — MORPHINE SULFATE 2 MG/ML
INJECTION, SOLUTION INTRAMUSCULAR; INTRAVENOUS
Status: COMPLETED
Start: 2024-11-12 | End: 2024-11-12

## 2024-11-12 RX ORDER — ACETAMINOPHEN 500 MG
1000 TABLET ORAL ONCE
Status: COMPLETED | OUTPATIENT
Start: 2024-11-12 | End: 2024-11-12

## 2024-11-12 RX ORDER — VANCOMYCIN HYDROCHLORIDE 1 G/20ML
INJECTION, POWDER, LYOPHILIZED, FOR SOLUTION INTRAVENOUS PRN
Status: DISCONTINUED | OUTPATIENT
Start: 2024-11-12 | End: 2024-11-12 | Stop reason: ALTCHOICE

## 2024-11-12 RX ORDER — ATORVASTATIN CALCIUM 40 MG/1
40 TABLET, FILM COATED ORAL DAILY
Status: DISCONTINUED | OUTPATIENT
Start: 2024-11-12 | End: 2024-11-15 | Stop reason: HOSPADM

## 2024-11-12 RX ORDER — SODIUM CHLORIDE 0.9 % (FLUSH) 0.9 %
5-40 SYRINGE (ML) INJECTION PRN
Status: DISCONTINUED | OUTPATIENT
Start: 2024-11-12 | End: 2024-11-15 | Stop reason: HOSPADM

## 2024-11-12 RX ORDER — FAMOTIDINE 20 MG/1
20 TABLET, FILM COATED ORAL 2 TIMES DAILY
Status: DISCONTINUED | OUTPATIENT
Start: 2024-11-12 | End: 2024-11-15 | Stop reason: HOSPADM

## 2024-11-12 RX ORDER — TRAMADOL HYDROCHLORIDE 50 MG/1
50 TABLET ORAL EVERY 4 HOURS PRN
Status: DISCONTINUED | OUTPATIENT
Start: 2024-11-12 | End: 2024-11-15 | Stop reason: HOSPADM

## 2024-11-12 RX ORDER — HYDROXYZINE PAMOATE 25 MG/1
25 CAPSULE ORAL 3 TIMES DAILY PRN
Status: DISCONTINUED | OUTPATIENT
Start: 2024-11-12 | End: 2024-11-15 | Stop reason: HOSPADM

## 2024-11-12 RX ORDER — ACETAMINOPHEN 325 MG/1
650 TABLET ORAL EVERY 6 HOURS
Status: DISCONTINUED | OUTPATIENT
Start: 2024-11-12 | End: 2024-11-15 | Stop reason: HOSPADM

## 2024-11-12 RX ORDER — NALOXONE HYDROCHLORIDE 0.4 MG/ML
INJECTION, SOLUTION INTRAMUSCULAR; INTRAVENOUS; SUBCUTANEOUS PRN
Status: DISCONTINUED | OUTPATIENT
Start: 2024-11-12 | End: 2024-11-12 | Stop reason: HOSPADM

## 2024-11-12 RX ORDER — ROPINIROLE 1 MG/1
2 TABLET, FILM COATED ORAL NIGHTLY
Status: DISCONTINUED | OUTPATIENT
Start: 2024-11-12 | End: 2024-11-15 | Stop reason: HOSPADM

## 2024-11-12 RX ORDER — FENTANYL CITRATE 50 UG/ML
INJECTION, SOLUTION INTRAMUSCULAR; INTRAVENOUS
Status: DISCONTINUED | OUTPATIENT
Start: 2024-11-12 | End: 2024-11-12 | Stop reason: SDUPTHER

## 2024-11-12 RX ORDER — TRANEXAMIC ACID 100 MG/ML
INJECTION, SOLUTION INTRAVENOUS PRN
Status: DISCONTINUED | OUTPATIENT
Start: 2024-11-12 | End: 2024-11-12 | Stop reason: ALTCHOICE

## 2024-11-12 RX ORDER — HYDROCODONE BITARTRATE AND ACETAMINOPHEN 5; 325 MG/1; MG/1
2 TABLET ORAL EVERY 4 HOURS PRN
Status: DISCONTINUED | OUTPATIENT
Start: 2024-11-12 | End: 2024-11-15 | Stop reason: HOSPADM

## 2024-11-12 RX ORDER — SODIUM PHOSPHATE, DIBASIC AND SODIUM PHOSPHATE, MONOBASIC 7; 19 G/230ML; G/230ML
1 ENEMA RECTAL DAILY PRN
Status: DISCONTINUED | OUTPATIENT
Start: 2024-11-12 | End: 2024-11-15 | Stop reason: HOSPADM

## 2024-11-12 RX ORDER — MYCOPHENOLATE MOFETIL 500 MG/1
1000 TABLET ORAL 2 TIMES DAILY
COMMUNITY

## 2024-11-12 RX ORDER — ROPINIROLE 2 MG/1
2 TABLET, FILM COATED ORAL NIGHTLY
COMMUNITY

## 2024-11-12 RX ORDER — ONDANSETRON 2 MG/ML
4 INJECTION INTRAMUSCULAR; INTRAVENOUS
Status: DISCONTINUED | OUTPATIENT
Start: 2024-11-12 | End: 2024-11-12 | Stop reason: HOSPADM

## 2024-11-12 RX ORDER — SODIUM CHLORIDE 9 MG/ML
INJECTION, SOLUTION INTRAVENOUS CONTINUOUS
Status: DISCONTINUED | OUTPATIENT
Start: 2024-11-12 | End: 2024-11-15 | Stop reason: HOSPADM

## 2024-11-12 RX ADMIN — ROPINIROLE HYDROCHLORIDE 2 MG: 1 TABLET, FILM COATED ORAL at 21:12

## 2024-11-12 RX ADMIN — FENTANYL CITRATE 50 MCG: 50 INJECTION, SOLUTION INTRAMUSCULAR; INTRAVENOUS at 12:20

## 2024-11-12 RX ADMIN — SODIUM CHLORIDE: 9 INJECTION, SOLUTION INTRAVENOUS at 16:00

## 2024-11-12 RX ADMIN — ACETAMINOPHEN 1000 MG: 500 TABLET ORAL at 09:50

## 2024-11-12 RX ADMIN — ATORVASTATIN CALCIUM 40 MG: 40 TABLET, FILM COATED ORAL at 21:00

## 2024-11-12 RX ADMIN — MORPHINE SULFATE 2 MG: 2 INJECTION, SOLUTION INTRAMUSCULAR; INTRAVENOUS at 11:53

## 2024-11-12 RX ADMIN — FENTANYL CITRATE 50 MCG: 50 INJECTION, SOLUTION INTRAMUSCULAR; INTRAVENOUS at 09:59

## 2024-11-12 RX ADMIN — Medication 50 MCG: at 10:43

## 2024-11-12 RX ADMIN — HYDROCODONE BITARTRATE AND ACETAMINOPHEN 2 TABLET: 5; 325 TABLET ORAL at 23:17

## 2024-11-12 RX ADMIN — ROPIVACAINE HYDROCHLORIDE 30 ML: 5 INJECTION, SOLUTION EPIDURAL; INFILTRATION; PERINEURAL at 09:50

## 2024-11-12 RX ADMIN — FENTANYL CITRATE 100 MCG: 50 INJECTION, SOLUTION INTRAMUSCULAR; INTRAVENOUS at 10:32

## 2024-11-12 RX ADMIN — SODIUM CHLORIDE: 9 INJECTION, SOLUTION INTRAVENOUS at 20:04

## 2024-11-12 RX ADMIN — ROCURONIUM BROMIDE 25 MG: 10 INJECTION INTRAVENOUS at 09:59

## 2024-11-12 RX ADMIN — HYDROCODONE BITARTRATE AND ACETAMINOPHEN 2 TABLET: 5; 325 TABLET ORAL at 13:47

## 2024-11-12 RX ADMIN — FENTANYL CITRATE 50 MCG: 50 INJECTION, SOLUTION INTRAMUSCULAR; INTRAVENOUS at 12:03

## 2024-11-12 RX ADMIN — TAMSULOSIN HYDROCHLORIDE 0.4 MG: 0.4 CAPSULE ORAL at 21:03

## 2024-11-12 RX ADMIN — CYCLOBENZAPRINE 10 MG: 10 TABLET, FILM COATED ORAL at 15:30

## 2024-11-12 RX ADMIN — FENTANYL CITRATE 50 MCG: 50 INJECTION, SOLUTION INTRAMUSCULAR; INTRAVENOUS at 10:07

## 2024-11-12 RX ADMIN — HYDROCODONE BITARTRATE AND ACETAMINOPHEN 2 TABLET: 5; 325 TABLET ORAL at 18:41

## 2024-11-12 RX ADMIN — SUGAMMADEX 200 MG: 100 INJECTION, SOLUTION INTRAVENOUS at 11:30

## 2024-11-12 RX ADMIN — GLYCOPYRROLATE 0.2 MG: 0.2 INJECTION INTRAMUSCULAR; INTRAVENOUS at 10:36

## 2024-11-12 RX ADMIN — FAMOTIDINE 20 MG: 20 TABLET, FILM COATED ORAL at 21:12

## 2024-11-12 RX ADMIN — CEFAZOLIN 2000 MG: 2 INJECTION, POWDER, FOR SOLUTION INTRAVENOUS at 21:00

## 2024-11-12 RX ADMIN — FENTANYL CITRATE 50 MCG: 50 INJECTION, SOLUTION INTRAMUSCULAR; INTRAVENOUS at 11:37

## 2024-11-12 RX ADMIN — TRAMADOL HYDROCHLORIDE 50 MG: 50 TABLET ORAL at 20:56

## 2024-11-12 RX ADMIN — MYCOPHENOLATE MOFETIL 1000 MG: 250 CAPSULE ORAL at 21:02

## 2024-11-12 RX ADMIN — FENTANYL CITRATE 50 MCG: 50 INJECTION, SOLUTION INTRAMUSCULAR; INTRAVENOUS at 11:58

## 2024-11-12 RX ADMIN — ONDANSETRON 4 MG: 2 INJECTION INTRAMUSCULAR; INTRAVENOUS at 11:30

## 2024-11-12 RX ADMIN — CELECOXIB 100 MG: 100 CAPSULE ORAL at 09:50

## 2024-11-12 RX ADMIN — FENTANYL CITRATE 50 MCG: 50 INJECTION, SOLUTION INTRAMUSCULAR; INTRAVENOUS at 11:22

## 2024-11-12 RX ADMIN — WATER 2000 MG: 1 INJECTION INTRAMUSCULAR; INTRAVENOUS; SUBCUTANEOUS at 10:10

## 2024-11-12 RX ADMIN — ASPIRIN 325 MG: 325 TABLET, COATED ORAL at 21:12

## 2024-11-12 RX ADMIN — TRANEXAMIC ACID 1000 MG: 100 INJECTION, SOLUTION INTRAVENOUS at 10:17

## 2024-11-12 RX ADMIN — FENTANYL CITRATE 50 MCG: 50 INJECTION, SOLUTION INTRAMUSCULAR; INTRAVENOUS at 12:30

## 2024-11-12 RX ADMIN — AMLODIPINE BESYLATE 5 MG: 5 TABLET ORAL at 21:00

## 2024-11-12 RX ADMIN — TRAMADOL HYDROCHLORIDE 50 MG: 50 TABLET ORAL at 15:30

## 2024-11-12 RX ADMIN — SODIUM CHLORIDE: 9 INJECTION, SOLUTION INTRAVENOUS at 09:54

## 2024-11-12 RX ADMIN — FLUOXETINE 40 MG: 20 CAPSULE ORAL at 21:01

## 2024-11-12 RX ADMIN — FINASTERIDE 5 MG: 5 TABLET, FILM COATED ORAL at 21:01

## 2024-11-12 RX ADMIN — PROPOFOL 160 MG: 10 INJECTION, EMULSION INTRAVENOUS at 09:59

## 2024-11-12 ASSESSMENT — PAIN SCALES - GENERAL
PAINLEVEL_OUTOF10: 9
PAINLEVEL_OUTOF10: 8
PAINLEVEL_OUTOF10: 8
PAINLEVEL_OUTOF10: 7
PAINLEVEL_OUTOF10: 9
PAINLEVEL_OUTOF10: 7
PAINLEVEL_OUTOF10: 6
PAINLEVEL_OUTOF10: 9
PAINLEVEL_OUTOF10: 7
PAINLEVEL_OUTOF10: 6
PAINLEVEL_OUTOF10: 8
PAINLEVEL_OUTOF10: 6
PAINLEVEL_OUTOF10: 6
PAINLEVEL_OUTOF10: 7
PAINLEVEL_OUTOF10: 7
PAINLEVEL_OUTOF10: 6
PAINLEVEL_OUTOF10: 9
PAINLEVEL_OUTOF10: 8

## 2024-11-12 ASSESSMENT — PAIN DESCRIPTION - LOCATION
LOCATION: HIP;KNEE
LOCATION: KNEE
LOCATION: KNEE;HIP
LOCATION: HIP
LOCATION: HIP
LOCATION: HIP;KNEE
LOCATION: HIP;KNEE
LOCATION: HIP

## 2024-11-12 ASSESSMENT — PAIN DESCRIPTION - DESCRIPTORS
DESCRIPTORS: ACHING
DESCRIPTORS: ACHING
DESCRIPTORS: STABBING
DESCRIPTORS: DISCOMFORT;BURNING
DESCRIPTORS: ACHING;THROBBING
DESCRIPTORS: STABBING

## 2024-11-12 ASSESSMENT — PAIN DESCRIPTION - PAIN TYPE
TYPE: SURGICAL PAIN
TYPE: ACUTE PAIN;SURGICAL PAIN

## 2024-11-12 ASSESSMENT — PAIN DESCRIPTION - FREQUENCY
FREQUENCY: CONTINUOUS

## 2024-11-12 ASSESSMENT — PAIN DESCRIPTION - ORIENTATION
ORIENTATION: RIGHT

## 2024-11-12 ASSESSMENT — PAIN - FUNCTIONAL ASSESSMENT
PAIN_FUNCTIONAL_ASSESSMENT: 0-10
PAIN_FUNCTIONAL_ASSESSMENT: 0-10
PAIN_FUNCTIONAL_ASSESSMENT: PREVENTS OR INTERFERES WITH MANY ACTIVE NOT PASSIVE ACTIVITIES
PAIN_FUNCTIONAL_ASSESSMENT: PREVENTS OR INTERFERES SOME ACTIVE ACTIVITIES AND ADLS

## 2024-11-12 ASSESSMENT — PAIN SCALES - WONG BAKER: WONGBAKER_NUMERICALRESPONSE: NO HURT

## 2024-11-12 ASSESSMENT — PAIN DESCRIPTION - ONSET
ONSET: ON-GOING

## 2024-11-12 NOTE — PROGRESS NOTES
Patient oriented to Same Day department and admitted to Same Day Surgery room 14.   Patient verbalized approval for first name, last initial with physician name on unit whiteboard.     Plan of care reviewed with patient.   Patient room whiteboard filled out and discussed with patient and responsible adult.   Patient and responsible adult offered Same Day Welcome Packet to review.    Call light in reach.   Bed in lowest position, locked, with one bed rail up.   SCDs and warming blanket in place.  Appropriate arm bands on patient.   Bathroom offered.   All questions and concerns of patient addressed.        Meds to Beds:   Patient informed of [facility] Meds to Beds program during admission. Patient is agreeable to program.   Contact information for the pharmacy and the Meds to Beds program:   Name: Duane   Relationship to patient:patient   Phone number: in house

## 2024-11-12 NOTE — H&P
[facility]  History and Physical Update    Pt Name: Duane Scheiderer  MRN: [id number]  YOB: 1950  Date of evaluation: 11/12/2024    I have examined the patient and reviewed the H&P/Consult and there are no changes to the patient or plans.      Kelby Croft MD  Electronically signed 11/12/2024 at 7:30 AM

## 2024-11-12 NOTE — ANESTHESIA PRE PROCEDURE
implant, initial encounter ([facility]) T84.7XXA   • Failed total joint replacement ([facility]) T84.019A   • Controlled type 2 diabetes mellitus with complication, with long-term current use of insulin ([facility]) E11.8, Z79.4   • Primary osteoarthritis of right hip M16.11       Past Medical History:        Diagnosis Date   • Arthritis    • Diabetes mellitus ([facility])    • Enlarged prostate    • History of blood transfusion    • History of thymectomy    • Hyperlipidemia    • Myasthenia gravis ([facility])    • Retention of urine    • Sleep apnea     does not use CPAP   • Thyroid disease    • Torn rotator cuff 2019    right       Past Surgical History:        Procedure Laterality Date   • APPENDECTOMY     • BACK SURGERY      x6   • CARPAL TUNNEL RELEASE     • CHOLECYSTECTOMY     • JOINT REPLACEMENT Left     hip and knee   • REVISION TOTAL KNEE ARTHROPLASTY Left 7/9/2019    FIRST STAGE REVISION LEFT TOTAL KNEE ARTHROPLASTY, ORIF LEFT PATELLA performed by Kelby Croft MD at [facility] OR   • REVISION TOTAL KNEE ARTHROPLASTY Left 9/3/2019    SECOND STAGE REVISION LEFT TOTAL KNEE performed by Kelby Croft MD at [facility] OR   • SHOULDER SURGERY Right 2020   • THYMECTOMY     • THYROIDECTOMY, PARTIAL         Social History:    Social History     Tobacco Use   • Smoking status: Never   • Smokeless tobacco: Never   Substance Use Topics   • Alcohol use: Never                                Counseling given: Not Answered      Vital Signs (Current):   Vitals:    11/12/24 0830 11/12/24 0836   BP:  (!) 182/76   Pulse:  70   Resp:  16   Temp:  97.5 °F (36.4 °C)   TempSrc:  Temporal   SpO2:  96%   Weight: 118.4 kg (261 lb) 118.4 kg (261 lb)   Height:  1.905 m (6' 3\")                                              BP Readings from Last 3 Encounters:   11/12/24 (!) 182/76   01/22/20 (!) 109/58   11/27/19 131/60       NPO Status: Time of last liquid consumption: 2100                        Time of last solid consumption: 2100                        Date of last

## 2024-11-12 NOTE — ANESTHESIA POSTPROCEDURE EVALUATION
Department of Anesthesiology  Postprocedure Note    Patient: Duane Scheiderer  MRN: [id number]  YOB: 1950  Date of evaluation: 11/12/2024    Procedure Summary       Date: 11/12/24 Room / Location: [facility] OR [address] [place] [address] OR    Anesthesia Start: 0955 Anesthesia Stop: 1141    Procedure: RIGHT TOTAL HIP, LATERAL APPROACH (Right: Hip) Diagnosis:       Osteoarthritis of right hip, unspecified osteoarthritis type      (Osteoarthritis of right hip, unspecified osteoarthritis type [M16.11])    Surgeons: Kelby Croft MD Responsible Provider: Domenico Webb DO    Anesthesia Type: regional, general ASA Status: 3            Anesthesia Type: No value filed.    Rakesh Phase I: Rakesh Score: 9    Rakesh Phase II:      Anesthesia Post Evaluation    Patient location during evaluation: PACU  Patient participation: complete - patient participated  Level of consciousness: awake  Airway patency: patent  Nausea & Vomiting: no vomiting and no nausea  Cardiovascular status: hemodynamically stable  Respiratory status: acceptable  Hydration status: stable  Pain management: adequate    No notable events documented.

## 2024-11-12 NOTE — PROGRESS NOTES
1830: Report called to Troy VIERA on . Pt placed or transport.   1850: Pt transported to  in stable condition.

## 2024-11-12 NOTE — OP NOTE
[address] [address]                            OPERATIVE REPORT      PATIENT NAME: SCHEIDERER, DUANE             : 1950  MED REC NO: [id number]                       ROOM: [facility]                                               (General) POOL                                               RM    ACCOUNT NO: [id number]                       ADMIT DATE: 2024  PROVIDER: Kelby Croft MD      DATE OF PROCEDURE:  2024    SURGEON:  Kelby Croft MD    ASSISTANT:  EDUARDO Castano    PREOPERATIVE DIAGNOSIS:  Right hip degenerative joint disease.    POSTOPERATIVE DIAGNOSIS:  Right hip degenerative joint disease.    OPERATION PERFORMED:  Right total hip replacement.    MODIFIER 22:  The surgery took 50% longer than normal secondary to body mass index of 32.6.    ANESTHESIA:  General plus block.    SPECIMENS TO PATHOLOGY:  None.    IMPLANTS USED:  From [employer].  1. Size 54 mm outer diameter no-hole R3 acetabular shell with [place] hole eliminator.  2. Size 36 mm inner diameter 20-degree lipped polyethylene liner.  3. Size 15 high-offset Synergy cementless femoral stem.  4. Size 36 mm +0 cobalt chrome femoral head.    COMPLICATIONS:  None.    TOURNIQUET TIME:  None.    ESTIMATED BLOOD LOSS:  300 mL.    ANTIBIOTICS:  Preoperative Ancef, intraoperative vancomycin powder and Irrisept.    HISTORY OF PRESENT ILLNESS:  The patient is a pleasant 74-year-old gentleman, who I have been following for quite some time for right hip.  He has had a history of left total hip replacement done in the remote past, did reasonably well.  He presented back for increasing pain, problems with increasing stiffness, increasing difficulties with activities of daily living including in and out of chairs, up and down stairs, in and out of cars with his right hip.  He has tried and failed conservative measures including anti-inflammatory

## 2024-11-12 NOTE — ANESTHESIA PROCEDURE NOTES
Peripheral Block    Patient location during procedure: OR  Reason for block: post-op pain management and at surgeon's request  Start time: 11/12/2024 9:50 AM  End time: 11/12/2024 9:54 AM  Staffing  Performed: anesthesiologist   Anesthesiologist: Domenico Webb DO  Performed by: Domenico Webb DO  Authorized by: Domenico Webb DO    Preanesthetic Checklist  Completed: patient identified, IV checked, site marked, risks and benefits discussed, surgical/procedural consents, equipment checked, pre-op evaluation, timeout performed, anesthesia consent given, oxygen available, monitors applied/VS acknowledged, fire risk safety assessment completed and verbalized and blood product R/B/A discussed and consented  Peripheral Block   Patient position: supine  Prep: ChloraPrep  Provider prep: mask and sterile gloves  Patient monitoring: cardiac monitor, continuous pulse ox, frequent blood pressure checks, IV access, responsive to questions and continuous capnometry  Block type: PENG and Femoral lateral cutaneous  Laterality: right  Injection technique: single-shot  Guidance: ultrasound guided    Needle   Needle type: insulated echogenic nerve stimulator needle   Needle gauge: 20 G  Needle localization: ultrasound guidance  Needle length: 10 cm  Assessment   Injection assessment: negative aspiration for heme, no paresthesia on injection, local visualized surrounding nerve on ultrasound and no intravascular symptoms  Paresthesia pain: none  Slow fractionated injection: yes  Hemodynamics: stable  Outcomes: uncomplicated and patient tolerated procedure well    Medications Administered  ropivacaine (NAROPIN) injection 0.5% - Perineural   30 mL - 11/12/2024 9:50:00 AM

## 2024-11-12 NOTE — PROGRESS NOTES
1137: Pt arrives to pacu, alert. Placed pt on NC upon arrival to pacu, respirations easy and unlabored. Pt c/o pain, 50mcg fentanyl administered per crna. VSS    1153: Pt c/o right hip surgical pain. 6/10. Morphine 2mg IV administered.     1158: XR at bedside. Pt c/o of right hip pain 9/10. 50 mcg fentanyl administered.     1203: Pt c/o of right hip pain 9/10. 50 mcg fentanyl administered.     1220:  Pt c/o of right hip pain 9/10. 50 mcg fentanyl administered. Glucose 137, no treatment needed    1230: Pt sleeping, easily awakens to voice and continues to c/o pain, 50mcg of fentanyl given    1235: Pt sleeping, easily awakens to voice and drifts back to sleep. VSS.     1250: Pt meets criteria for pacu discharge.    1305: Pt transported to [facility] in stable condition.

## 2024-11-12 NOTE — BRIEF OP NOTE
Brief Postoperative Note      Patient: Duane Scheiderer  YOB: 1950  MRN: [id number]    Date of Procedure: 11/12/2024    Pre-Op Diagnosis Codes:      * Osteoarthritis of right hip, unspecified osteoarthritis type [M16.11]    Post-Op Diagnosis: Same       Procedure(s):  RIGHT TOTAL HIP, LATERAL APPROACH    Surgeon(s):  Kelby Croft MD    Assistant:  Physician Assistant: Sandip Arizmendi PA    Anesthesia: Spinal    Estimated Blood Loss (mL): 300     Complications: None    Specimens:   * No specimens in log *    Implants:  Implant Name Type Inv. Item Serial No.  Lot No. LRB No. Used Action   COVER H ACET HIP THRD FOR REFLCT SYS R3 - [id number]  COVER H ACET HIP THRD FOR REFLCT SYS R3  [employer] [email or web address] [id number] Right 1 Implanted   SHELL ACET [id number] STD HIP HA NO H SLD R3 - [id number]  SHELL ACET [id number] STD HIP HA NO H SLD R3  SM[employer] [facility] [email or web address] [id number] Right 1 Implanted   LINER ACET 20 DEG 36X54 MM HIP XLPE R3 - [id number]  LINER ACET 20 DEG 36X54 MM HIP XLPE R3  SMITH [facility] [email or web address] [id number] Right 1 Implanted   STEM FEM SZ 15 L165MM HIP TI POR HI OFFSET CEMENTLESS SCOOTER - [id number]  STEM FEM SZ 15 L165MM HIP TI POR HI OFFSET CEMENTLESS SCOOTER  [employer] [email or web address] [id number] Right 1 Implanted   HEAD FEM [id number] +0MM OFFSET CO CHROM 12/14 TAPR - [id number]  HEAD FEM [id number] +0MM OFFSET CO CHROM 12/14 TAPR  [employer] [email or web address] [id number] Right 1 Implanted         Drains: * No LDAs found *    Findings:  Infection Present At Time Of Surgery (PATOS) (choose all levels that have infection present):  No infection present  Other Findings: See operative dictation    Electronically signed by Kelby Croft MD on 11/12/2024 at 11:16 AM

## 2024-11-13 LAB
BASOPHILS ABSOLUTE: 0 THOU/MM3 (ref 0–0.1)
BASOPHILS NFR BLD AUTO: 0.4 %
DEPRECATED RDW RBC AUTO: 45.1 FL (ref 35–45)
EOSINOPHIL NFR BLD AUTO: 3.3 %
EOSINOPHILS ABSOLUTE: 0.2 THOU/MM3 (ref 0–0.4)
ERYTHROCYTE [DISTWIDTH] IN BLOOD BY AUTOMATED COUNT: 13.7 % (ref 11.5–14.5)
HCT VFR BLD AUTO: 35.1 % (ref 42–52)
HGB BLD-MCNC: 11.2 GM/DL (ref 14–18)
IMM GRANULOCYTES # BLD AUTO: 0.03 THOU/MM3 (ref 0–0.07)
IMM GRANULOCYTES NFR BLD AUTO: 0.4 %
LYMPHOCYTES ABSOLUTE: 0.6 THOU/MM3 (ref 1–4.8)
LYMPHOCYTES NFR BLD AUTO: 8.1 %
MCH RBC QN AUTO: 28.9 PG (ref 26–33)
MCHC RBC AUTO-ENTMCNC: 31.9 GM/DL (ref 32.2–35.5)
MCV RBC AUTO: 90.7 FL (ref 80–94)
MONOCYTES ABSOLUTE: 0.5 THOU/MM3 (ref 0.4–1.3)
MONOCYTES NFR BLD AUTO: 6.8 %
NEUTROPHILS ABSOLUTE: 5.7 THOU/MM3 (ref 1.8–7.7)
NEUTROPHILS NFR BLD AUTO: 81 %
NRBC BLD AUTO-RTO: 0 /100 WBC
PLATELET # BLD AUTO: 157 THOU/MM3 (ref 130–400)
PMV BLD AUTO: 10.9 FL (ref 9.4–12.4)
RBC # BLD AUTO: 3.87 MILL/MM3 (ref 4.7–6.1)
WBC # BLD AUTO: 7 THOU/MM3 (ref 4.8–10.8)

## 2024-11-13 PROCEDURE — 97166 OT EVAL MOD COMPLEX 45 MIN: CPT

## 2024-11-13 PROCEDURE — 97162 PT EVAL MOD COMPLEX 30 MIN: CPT

## 2024-11-13 PROCEDURE — 97530 THERAPEUTIC ACTIVITIES: CPT

## 2024-11-13 PROCEDURE — 85025 COMPLETE CBC W/AUTO DIFF WBC: CPT

## 2024-11-13 PROCEDURE — 6370000000 HC RX 637 (ALT 250 FOR IP): Performed by: ORTHOPAEDIC SURGERY

## 2024-11-13 PROCEDURE — 6360000002 HC RX W HCPCS: Performed by: ORTHOPAEDIC SURGERY

## 2024-11-13 PROCEDURE — 2580000003 HC RX 258: Performed by: ORTHOPAEDIC SURGERY

## 2024-11-13 PROCEDURE — 97110 THERAPEUTIC EXERCISES: CPT

## 2024-11-13 RX ADMIN — PYRIDOSTIGMINE BROMIDE 180 MG: 60 TABLET ORAL at 08:56

## 2024-11-13 RX ADMIN — ROPINIROLE HYDROCHLORIDE 2 MG: 1 TABLET, FILM COATED ORAL at 21:36

## 2024-11-13 RX ADMIN — SODIUM CHLORIDE: 9 INJECTION, SOLUTION INTRAVENOUS at 03:21

## 2024-11-13 RX ADMIN — CYCLOBENZAPRINE 10 MG: 10 TABLET, FILM COATED ORAL at 03:03

## 2024-11-13 RX ADMIN — SODIUM CHLORIDE, PRESERVATIVE FREE 10 ML: 5 INJECTION INTRAVENOUS at 21:41

## 2024-11-13 RX ADMIN — MYCOPHENOLATE MOFETIL 1000 MG: 250 CAPSULE ORAL at 21:41

## 2024-11-13 RX ADMIN — CEFAZOLIN 2000 MG: 2 INJECTION, POWDER, FOR SOLUTION INTRAVENOUS at 03:22

## 2024-11-13 RX ADMIN — Medication 500 MG: at 08:56

## 2024-11-13 RX ADMIN — TAMSULOSIN HYDROCHLORIDE 0.4 MG: 0.4 CAPSULE ORAL at 08:56

## 2024-11-13 RX ADMIN — TRAMADOL HYDROCHLORIDE 50 MG: 50 TABLET ORAL at 23:47

## 2024-11-13 RX ADMIN — HYDROCODONE BITARTRATE AND ACETAMINOPHEN 2 TABLET: 5; 325 TABLET ORAL at 03:03

## 2024-11-13 RX ADMIN — POLYETHYLENE GLYCOL 3350 17 G: 17 POWDER, FOR SOLUTION ORAL at 08:52

## 2024-11-13 RX ADMIN — HYDROCODONE BITARTRATE AND ACETAMINOPHEN 2 TABLET: 5; 325 TABLET ORAL at 21:34

## 2024-11-13 RX ADMIN — HYDROCODONE BITARTRATE AND ACETAMINOPHEN 2 TABLET: 5; 325 TABLET ORAL at 11:37

## 2024-11-13 RX ADMIN — Medication 500 MG: at 17:27

## 2024-11-13 RX ADMIN — AMLODIPINE BESYLATE 5 MG: 5 TABLET ORAL at 08:56

## 2024-11-13 RX ADMIN — CEFAZOLIN 2000 MG: 2 INJECTION, POWDER, FOR SOLUTION INTRAVENOUS at 12:52

## 2024-11-13 RX ADMIN — FAMOTIDINE 20 MG: 20 TABLET, FILM COATED ORAL at 08:52

## 2024-11-13 RX ADMIN — TRAMADOL HYDROCHLORIDE 50 MG: 50 TABLET ORAL at 12:58

## 2024-11-13 RX ADMIN — ASPIRIN 325 MG: 325 TABLET, COATED ORAL at 08:52

## 2024-11-13 RX ADMIN — HYDROCODONE BITARTRATE AND ACETAMINOPHEN 2 TABLET: 5; 325 TABLET ORAL at 17:28

## 2024-11-13 RX ADMIN — ATORVASTATIN CALCIUM 40 MG: 40 TABLET, FILM COATED ORAL at 08:56

## 2024-11-13 RX ADMIN — FINASTERIDE 5 MG: 5 TABLET, FILM COATED ORAL at 08:56

## 2024-11-13 RX ADMIN — ASPIRIN 325 MG: 325 TABLET, COATED ORAL at 21:34

## 2024-11-13 RX ADMIN — FLUOXETINE 40 MG: 20 CAPSULE ORAL at 08:56

## 2024-11-13 RX ADMIN — MYCOPHENOLATE MOFETIL 1000 MG: 250 CAPSULE ORAL at 08:56

## 2024-11-13 RX ADMIN — FAMOTIDINE 20 MG: 20 TABLET, FILM COATED ORAL at 21:34

## 2024-11-13 ASSESSMENT — PAIN DESCRIPTION - LOCATION
LOCATION: HIP;KNEE

## 2024-11-13 ASSESSMENT — PAIN SCALES - GENERAL
PAINLEVEL_OUTOF10: 7
PAINLEVEL_OUTOF10: 5
PAINLEVEL_OUTOF10: 5
PAINLEVEL_OUTOF10: 7
PAINLEVEL_OUTOF10: 4
PAINLEVEL_OUTOF10: 10
PAINLEVEL_OUTOF10: 4
PAINLEVEL_OUTOF10: 5
PAINLEVEL_OUTOF10: 5

## 2024-11-13 ASSESSMENT — PAIN DESCRIPTION - DESCRIPTORS
DESCRIPTORS: SHARP;STABBING
DESCRIPTORS: SHARP;STABBING
DESCRIPTORS: SHARP;SHOOTING

## 2024-11-13 ASSESSMENT — PAIN DESCRIPTION - ORIENTATION
ORIENTATION: RIGHT

## 2024-11-13 ASSESSMENT — PAIN DESCRIPTION - PAIN TYPE: TYPE: ACUTE PAIN

## 2024-11-13 ASSESSMENT — PAIN - FUNCTIONAL ASSESSMENT: PAIN_FUNCTIONAL_ASSESSMENT: ACTIVITIES ARE NOT PREVENTED

## 2024-11-13 NOTE — CARE COORDINATION
Case Management Assessment Initial Evaluation    Date/Time of Evaluation: 11/13/2024 11:20 AM  Assessment Completed by: Yanni Solares RN    If patient is discharged prior to next notation, then this note serves as note for discharge by case management.    Patient Name: Duane Scheiderer                   YOB: 1950  Diagnosis: Osteoarthritis of right hip, unspecified osteoarthritis type [M16.11]  Primary osteoarthritis of right hip [M16.11]                   Date / Time: 11/12/2024  8:07 AM  Location: [address] [place] [address]     Patient Admission Status: Outpatient in a bed   Readmission Risk Low 0-14, Mod 15-19), High > 20: No data recorded  Current PCP: Katy Chaudhary,   Health Care Decision Makers:   Primary Decision Maker: HANNAH VALENTINE - Spouse - [phone]    PM Hx:   Chronic obstructive pulmonary disease.  Diabetes, Sleep apnea with use of CPAP machine.  Additional Case Management Notes: planned surgery with Dr Croft    Procedures: 11/12 Right total hip replacement.     Imaging: na    Patient Goals/Plan/Treatment Preferences: Met with Duane and his wife Hannah; they live home in [facility]. He has needed DME, cont insulin pump, and has CPAP which he does not use. HCDM verified, plans home, has  list.  Preference is [facility].          12:46 PM  Called referral to Desiree at [facility]; pt accepted. Planning home Friday.        11/13/24 1118   Service Assessment   Patient Orientation Alert and Oriented   Cognition Alert   History Provided By Patient;Spouse   Primary Caregiver Self   Accompanied By/Relationship wife Hannah   Support Systems Spouse/Significant Other   Patient's Healthcare Decision Maker is: Patient Declined (Legal Next of Kin Remains as Decision Maker)   PCP Verified by CM Yes   Last Visit to PCP Within last 3 months   Prior Functional Level Independent in ADLs/IADLs   Current Functional Level Assistance with the following:;Bathing   Can patient return to

## 2024-11-13 NOTE — CONSULTS
11/13/24, 1:15 PM EST    DISCHARGE PLANNING EVALUATION    Received a SW consult for \"dispo\". Patient reportedly wants to go to SNF following surgery. CORBY Liao spoke with patient and made him aware that due to being outpatient in a bed and having no inpatient criteria, he is not able to go to SNF with traditional Medicare. He was agreeable to [facility] for home health and CM made referral. CORBY denied needing SW to get involved with case. SW consult deferred at this time.

## 2024-11-13 NOTE — PLAN OF CARE
Problem: Chronic Conditions and Co-morbidities  Goal: Patient's chronic conditions and co-morbidity symptoms are monitored and maintained or improved  Outcome: Progressing  Flowsheets (Taken 11/12/2024 2012)  Care Plan - Patient's Chronic Conditions and Co-Morbidity Symptoms are Monitored and Maintained or Improved: Monitor and assess patient's chronic conditions and comorbid symptoms for stability, deterioration, or improvement     Problem: Pain  Goal: Verbalizes/displays adequate comfort level or baseline comfort level  Outcome: Progressing     Problem: Safety - Adult  Goal: Free from fall injury  Outcome: Progressing    Care plan reviewed with patient. Patient verbalize understanding of the plan of care and contribute to goal setting.

## 2024-11-13 NOTE — PROGRESS NOTES
Spiritual Health History and Assessment/Progress Note  [facility]    (P) Initial Encounter,  ,  ,      Name: Duane Scheiderer MRN: [id number]    Age: 74 y.o.     Sex: male   Language: English   Alevism: [place]   Primary osteoarthritis of right hip     Date: 11/13/2024            Total Time Calculated: (P) 40 min              Spiritual Assessment began in [facility] ORTHOPEDICS 7K        Referral/Consult From: (P) Nurse   Encounter Overview/Reason: (P) Initial Encounter  Service Provided For: (P) Patient, Significant other, Patient and family together    Sakina, Belief, Meaning:   Patient identifies as spiritual, is connected with a sakina tradition or spiritual practice, and has beliefs or practices that help with coping during difficult times  Family/Friends identify as spiritual, are connected with a sakina tradition or spiritual practice, and have beliefs or practices that help with coping during difficult times      Importance and Influence:  Patient has spiritual/personal beliefs that influence decisions regarding their health  Family/Friends have spiritual/personal beliefs that influence decisions regarding the patient's health    Community:  Patient is connected with a spiritual community and feels well-supported. Support system includes: Spouse/Partner, Children, Sakina Community, Friends, and Extended family  Family/Friends are connected with a spiritual community: and feel well-supported. Support system includes: Spouse/Partner, Sakina Community, and Friends    Assessment and Plan of Care:     Patient Interventions include: Facilitated expression of thoughts and feelings, Explored spiritual coping/struggle/distress, Engaged in theological reflection, Affirmed coping skills/support systems, and Provided sacramental/Gnosticist ritual  Family/Friends Interventions include: Facilitated expression of thoughts and feelings, Explored spiritual coping/struggle/distress, Engaged in theological

## 2024-11-13 NOTE — PROGRESS NOTES
[facility]  INPATIENT OCCUPATIONAL THERAPY  [facility] ORTHOPEDICS 7K  EVALUATION      Discharge Recommendations: Patient would benefit from continued therapy after discharge, Continue to assess pending progress  Equipment Recommendations: Yes        Time In: 1322  Time Out: 1341  Timed Code Treatment Minutes: 10 Minutes  Minutes: 19          Date: 2024  Patient Name: Duane Scheiderer,   Gender: male      MRN: [id number]  : 1950  (74 y.o.)  Referring Practitioner: Kelby Croft MD  Diagnosis: Primary osteoarthritis of right hip  Additional Pertinent Hx: per chart review; The patient is a pleasant gentleman who started having increasing pain in his right hip.  He is previously a patient of Dr. 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  He has done quite well, but unfortunately he suffers from quite a bit of comorbidities, lumbar surgery x6, and has neuropathy.  He has had a previous left total hip replacement in remote past, which he has done well with, but he is having increasing pain now on his right hip.  Having difficulty bending down tying shoes, crossing his legs, getting in and out of chairs, walking tolerance has decreased secondary to his hip pain.  He sees a neurologist in [place].  Poorly controlled diabetes.  He is on insulin pump.  He tries to keep his A1c close to 7.  He has had x-rays and an MRI showing severe degenerative joint disease and at this point, he would like to move forth with more definitive treatment after trying activity modification and cryotherapy attempted nonuse as well as ambulatory aids.\" patient underwent a  RIGHT TOTAL HIP, LATERAL APPROACH on 24.    Restrictions/Precautions:  Restrictions/Precautions: Weight Bearing, General Precautions, Surgical Protocols, Fall Risk  Right Lower Extremity Weight Bearing: Weight Bearing As Tolerated  Position Activity Restriction  Hip Precautions: No hip flexion > 90 degrees,

## 2024-11-13 NOTE — PROGRESS NOTES
Discussed insulin pump with Sandip Arizmendi at this time. Patient on continuous dose and bolus as needed based on blood sugar. PAC ordered patient ok to monitor own blood sugar and use system as he would at home. See nursing communication.

## 2024-11-13 NOTE — PROGRESS NOTES
Progress Note    Subjective:     Post-Operative Day: 1 Status Post right Total Hip Arthroplasty  Systemic or Specific Complaints:No Complaints    Objective:   VITALS:  BP (!) 126/57   Pulse 74   Temp 97.9 °F (36.6 °C) (Oral)   Resp 18   Ht 1.905 m (6' 3\")   Wt 118.4 kg (261 lb)   SpO2 94%   BMI 32.62 kg/m²     General: alert, appears stated age, and cooperative   Wound: Wound clean and dry no evidence of infection.   DVT Exam: No evidence of DVT seen on physical exam.   Abdomen soft and non tender  NVI in lower extremity. Thigh swollen but soft. Moving foot and ankle.      Data Review  CBC:   Lab Results   Component Value Date/Time    WBC 7.0 11/13/2024 05:50 AM    RBC 3.87 11/13/2024 05:50 AM    RBC 4.75 09/12/2022 02:48 PM    HGB 11.2 11/13/2024 05:50 AM    HCT 35.1 11/13/2024 05:50 AM     11/13/2024 05:50 AM    INR 0.96 11/12/2024 08:51 AM     BMP:   Lab Results   Component Value Date/Time     07/10/2024 09:25 AM    K 4.7 07/10/2024 09:25 AM     07/10/2024 09:25 AM    CO2 25 07/10/2024 09:25 AM    BUN 22 07/10/2024 09:25 AM    CREATININE 0.9 07/10/2024 09:25 AM    GLUCOSE 126 07/10/2024 09:25 AM       Assessment:     Status Post right Total Hip Arthroplasty. Doing well postoperatively.     Plan:      1:  Continue Total Hip Replacement protocol   2:  Continue Deep venous thrombosis prophylaxis  3:  Continue physical therapy with Total Hip precautions  4:  Continue Pain Control  5:  Monitor Labs  6:  Discharge pending IP rehab vs home    Sandip Arizmendi PA-C in collaboration with Dr. Croft

## 2024-11-13 NOTE — PROGRESS NOTES
flexion > 90 degrees, No hip internal rotation, No ADduction, No active ABduction  Other position/activity restrictions: h/o myasthenia gravis, insulin pump    Subjective:  Chart Reviewed: Yes  Patient assessed for rehabilitation services?: Yes  Family / Caregiver Present: Yes (Wife)  Subjective: RN approved session, patient resting in bed and agreeable to session but having difficulty staying awake during subjective portion of eval. Wife present and assisted in proving PLOF details.    General:  Overall Orientation Status: Within Functional Limits  Vision: Within Functional Limits  Hearing: Within functional limits       Pain: 8/10: right hip    Vitals:   Vitals:    11/13/24 0845   BP: 136/70   Pulse: 73   Resp: 18   Temp: 98.2 °F (36.8 °C)   SpO2: 95%     Social/Functional History:    Lives With: Spouse  Type of Home: House  Home Layout: One level  Home Access: Stairs to enter with rails  Entrance Stairs - Number of Steps: 4 RUPESH with 1 handrail. Does have a stair lift if patient unable to climb steps.  Entrance Stairs - Rails: Left  Home Equipment: Cane, Walker - 4-Wheeled, Electric scooter     Bathroom Shower/Tub: Walk-in shower (stall shower, not enough room for a chair)       ADL Assistance: Independent     Ambulation Assistance: Independent  Transfer Assistance: Independent          Additional Comments: Pt reports he was IND with his mobility prior to sx but used a cane or walker for mobiltiy and a motorized scooter for longer distances. Pt with h/o 6 lumbar surgeries, knee replacement, and recent diagnosis of myesthenia gravis in which he received infusions through a port. Pt's wife able to assist with housekeeping tasks, but unable to provide physical assist for patient. Pt went to SNF after last knee replacement for IV antibiotics. Spouse and patient hoping to have rehab before going home.    OBJECTIVE:  Range of Motion:  Right Lower Extremity: WFL, within hip precautions.   Left Lower Extremity:  education, & procurement, Therapeutic activities  General Plan:  (6xO)    Goals:  Patient Goals : Pt goal to move better before going home.  Short Term Goals  Time Frame for Short Term Goals: By discharge  Short Term Goal 1: Supine to/from sit at SBA in order to get in/out of bed.  Short Term Goal 2: Sit to/from stand at SBA in order to get up to walk.  Short Term Goal 3: Ambulate 50 feet with rollator, WBAT RLE at SBA in order to walk in home safely.  Short Term Goal 4: Ascend/Descend 4 steps with 1 handrails and cane at Min A in order to enter/exit home.  Short Term Goal 5: Pt will be able to verbalize 4/4 hip precautions IND in order to maintain proper precautions to avoid hip dislocation.  Long Term Goals  Time Frame for Long Term Goals : NA due to short ELOS    Following session, patient left in safe position with all fall risk precautions in place.

## 2024-11-14 PROCEDURE — 97116 GAIT TRAINING THERAPY: CPT

## 2024-11-14 PROCEDURE — 97535 SELF CARE MNGMENT TRAINING: CPT

## 2024-11-14 PROCEDURE — 2580000003 HC RX 258: Performed by: ORTHOPAEDIC SURGERY

## 2024-11-14 PROCEDURE — 97110 THERAPEUTIC EXERCISES: CPT

## 2024-11-14 PROCEDURE — 97530 THERAPEUTIC ACTIVITIES: CPT

## 2024-11-14 PROCEDURE — 6370000000 HC RX 637 (ALT 250 FOR IP): Performed by: ORTHOPAEDIC SURGERY

## 2024-11-14 RX ORDER — ASPIRIN 325 MG
325 TABLET, DELAYED RELEASE (ENTERIC COATED) ORAL 2 TIMES DAILY
Qty: 30 TABLET | Refills: 3 | Status: SHIPPED | OUTPATIENT
Start: 2024-11-14

## 2024-11-14 RX ADMIN — ROPINIROLE HYDROCHLORIDE 2 MG: 1 TABLET, FILM COATED ORAL at 20:35

## 2024-11-14 RX ADMIN — FINASTERIDE 5 MG: 5 TABLET, FILM COATED ORAL at 09:20

## 2024-11-14 RX ADMIN — TRAMADOL HYDROCHLORIDE 50 MG: 50 TABLET ORAL at 15:37

## 2024-11-14 RX ADMIN — ASPIRIN 325 MG: 325 TABLET, COATED ORAL at 20:29

## 2024-11-14 RX ADMIN — FLUOXETINE 40 MG: 20 CAPSULE ORAL at 09:20

## 2024-11-14 RX ADMIN — MYCOPHENOLATE MOFETIL 1000 MG: 250 CAPSULE ORAL at 20:30

## 2024-11-14 RX ADMIN — HYDROCODONE BITARTRATE AND ACETAMINOPHEN 2 TABLET: 5; 325 TABLET ORAL at 09:12

## 2024-11-14 RX ADMIN — HYDROCODONE BITARTRATE AND ACETAMINOPHEN 2 TABLET: 5; 325 TABLET ORAL at 20:29

## 2024-11-14 RX ADMIN — ATORVASTATIN CALCIUM 40 MG: 40 TABLET, FILM COATED ORAL at 09:20

## 2024-11-14 RX ADMIN — AMLODIPINE BESYLATE 5 MG: 5 TABLET ORAL at 09:20

## 2024-11-14 RX ADMIN — HYDROCODONE BITARTRATE AND ACETAMINOPHEN 2 TABLET: 5; 325 TABLET ORAL at 04:13

## 2024-11-14 RX ADMIN — TAMSULOSIN HYDROCHLORIDE 0.4 MG: 0.4 CAPSULE ORAL at 09:20

## 2024-11-14 RX ADMIN — TRAMADOL HYDROCHLORIDE 50 MG: 50 TABLET ORAL at 06:37

## 2024-11-14 RX ADMIN — ASPIRIN 325 MG: 325 TABLET, COATED ORAL at 09:11

## 2024-11-14 RX ADMIN — FAMOTIDINE 20 MG: 20 TABLET, FILM COATED ORAL at 09:11

## 2024-11-14 RX ADMIN — Medication 500 MG: at 15:37

## 2024-11-14 RX ADMIN — SODIUM CHLORIDE, PRESERVATIVE FREE 10 ML: 5 INJECTION INTRAVENOUS at 20:36

## 2024-11-14 RX ADMIN — POLYETHYLENE GLYCOL 3350 17 G: 17 POWDER, FOR SOLUTION ORAL at 09:12

## 2024-11-14 RX ADMIN — PYRIDOSTIGMINE BROMIDE 180 MG: 60 TABLET ORAL at 09:20

## 2024-11-14 RX ADMIN — MYCOPHENOLATE MOFETIL 1000 MG: 250 CAPSULE ORAL at 09:20

## 2024-11-14 RX ADMIN — Medication 500 MG: at 09:20

## 2024-11-14 RX ADMIN — FAMOTIDINE 20 MG: 20 TABLET, FILM COATED ORAL at 20:29

## 2024-11-14 RX ADMIN — SODIUM CHLORIDE, PRESERVATIVE FREE 10 ML: 5 INJECTION INTRAVENOUS at 10:28

## 2024-11-14 RX ADMIN — CYCLOBENZAPRINE 10 MG: 10 TABLET, FILM COATED ORAL at 06:37

## 2024-11-14 ASSESSMENT — PAIN SCALES - GENERAL
PAINLEVEL_OUTOF10: 5
PAINLEVEL_OUTOF10: 4
PAINLEVEL_OUTOF10: 5
PAINLEVEL_OUTOF10: 3
PAINLEVEL_OUTOF10: 5
PAINLEVEL_OUTOF10: 4
PAINLEVEL_OUTOF10: 5
PAINLEVEL_OUTOF10: 5
PAINLEVEL_OUTOF10: 6
PAINLEVEL_OUTOF10: 4
PAINLEVEL_OUTOF10: 0
PAINLEVEL_OUTOF10: 7

## 2024-11-14 ASSESSMENT — PAIN DESCRIPTION - LOCATION
LOCATION: KNEE;HIP
LOCATION: HIP

## 2024-11-14 ASSESSMENT — PAIN DESCRIPTION - DESCRIPTORS
DESCRIPTORS: ACHING

## 2024-11-14 ASSESSMENT — PAIN DESCRIPTION - ORIENTATION
ORIENTATION: RIGHT

## 2024-11-14 ASSESSMENT — PAIN SCALES - WONG BAKER: WONGBAKER_NUMERICALRESPONSE: NO HURT

## 2024-11-14 NOTE — PLAN OF CARE
Problem: Chronic Conditions and Co-morbidities  Goal: Patient's chronic conditions and co-morbidity symptoms are monitored and maintained or improved  Outcome: Progressing  Flowsheets (Taken 11/14/2024 1138)  Care Plan - Patient's Chronic Conditions and Co-Morbidity Symptoms are Monitored and Maintained or Improved:   Monitor and assess patient's chronic conditions and comorbid symptoms for stability, deterioration, or improvement   Collaborate with multidisciplinary team to address chronic and comorbid conditions and prevent exacerbation or deterioration     Problem: Discharge Planning  Goal: Discharge to home or other facility with appropriate resources  Outcome: Progressing  Flowsheets (Taken 11/14/2024 1138)  Discharge to home or other facility with appropriate resources:   Identify barriers to discharge with patient and caregiver   Arrange for needed discharge resources and transportation as appropriate   Identify discharge learning needs (meds, wound care, etc)     Problem: Pain  Goal: Verbalizes/displays adequate comfort level or baseline comfort level  Outcome: Progressing  Flowsheets (Taken 11/14/2024 1138)  Verbalizes/displays adequate comfort level or baseline comfort level:   Encourage patient to monitor pain and request assistance   Assess pain using appropriate pain scale   Administer analgesics based on type and severity of pain and evaluate response   Implement non-pharmacological measures as appropriate and evaluate response     Problem: Safety - Adult  Goal: Free from fall injury  Outcome: Progressing  Flowsheets (Taken 11/14/2024 1138)  Free From Fall Injury: Instruct family/caregiver on patient safety     Care plan reviewed with patient.  Patient verbalized understanding of the plan of care and contribute to goal setting.

## 2024-11-14 NOTE — PROGRESS NOTES
enter/exit home.  Short Term Goal 5: Pt will be able to verbalize 4/4 hip precautions IND in order to maintain proper precautions to avoid hip dislocation.  Long Term Goals  Time Frame for Long Term Goals : NA due to short ELOS    Following session, patient left in safe position with all fall risk precautions in place.

## 2024-11-14 NOTE — PROGRESS NOTES
[facility] ORTHOPEDICS 7K  Occupational Therapy  Daily Note    Discharge Recommendations: Home with assist as needed  Equipment Recommendations: Yes        Time In: 1050  Time Out: 1144  Timed Code Treatment Minutes: 54 Minutes  Minutes: 54          Date: 2024  Patient Name: Duane Scheiderer,   Gender: male      Room: -03/003-A  MRN: [id number]  : 1950  (74 y.o.)  Referring Practitioner: Kelby Croft MD  Diagnosis: Primary osteoarthritis of right hip  Additional Pertinent Hx: per chart review; The patient is a pleasant gentleman who started having increasing pain in his right hip.  He is previously a patient of Dr. 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  He has done quite well, but unfortunately he suffers from quite a bit of comorbidities, lumbar surgery x6, and has neuropathy.  He has had a previous left total hip replacement in remote past, which he has done well with, but he is having increasing pain now on his right hip.  Having difficulty bending down tying shoes, crossing his legs, getting in and out of chairs, walking tolerance has decreased secondary to his hip pain.  He sees a neurologist in [place].  Poorly controlled diabetes.  He is on insulin pump.  He tries to keep his A1c close to 7.  He has had x-rays and an MRI showing severe degenerative joint disease and at this point, he would like to move forth with more definitive treatment after trying activity modification and cryotherapy attempted nonuse as well as ambulatory aids.\" patient underwent a  RIGHT TOTAL HIP, LATERAL APPROACH on 24.    Restrictions/Precautions:  Restrictions/Precautions: Weight Bearing, General Precautions, Surgical Protocols, Fall Risk  Right Lower Extremity Weight Bearing: Weight Bearing As Tolerated  Position Activity Restriction  Hip Precautions: No hip flexion > 90 degrees, No hip internal rotation, No ADduction, No active

## 2024-11-14 NOTE — PROGRESS NOTES
Progress Note    Subjective:     Post-Operative Day: 2 Status Post right Total Hip Arthroplasty  Systemic or Specific Complaints:No Complaints    Objective:   VITALS:  BP (!) 146/71   Pulse 80   Temp 98.1 °F (36.7 °C) (Oral)   Resp 16   Ht 1.905 m (6' 3\")   Wt 118.4 kg (261 lb)   SpO2 93%   BMI 32.62 kg/m²   URINARY CATHETER OUTPUT ([place]):       General: alert, appears stated age, and cooperative   Wound: Wound clean and dry no evidence of infection. and No Erythema   DVT Exam: No evidence of DVT seen on physical exam.  Negative Amada's sign.     NVI in lower extremity. Thigh swollen but soft. Moving foot and ankle.      Data Review  CBC:   Lab Results   Component Value Date/Time    WBC 7.0 11/13/2024 05:50 AM    RBC 3.87 11/13/2024 05:50 AM    RBC 4.75 09/12/2022 02:48 PM    HGB 11.2 11/13/2024 05:50 AM    HCT 35.1 11/13/2024 05:50 AM     11/13/2024 05:50 AM    INR 0.96 11/12/2024 08:51 AM     BMP:   Lab Results   Component Value Date/Time     07/10/2024 09:25 AM    K 4.7 07/10/2024 09:25 AM     07/10/2024 09:25 AM    CO2 25 07/10/2024 09:25 AM    BUN 22 07/10/2024 09:25 AM    CREATININE 0.9 07/10/2024 09:25 AM    GLUCOSE 126 07/10/2024 09:25 AM       Assessment:     Status Post right Total Hip Arthroplasty. Doing well postoperatively.     Plan:      1:  Continue Total Hip Replacement protocol   2:  Continue Deep venous thrombosis prophylaxis  3:  Continue physical therapy with Total Hip precautions  4:  Continue Pain Control  5:  Poss home today vs. tomorrow

## 2024-11-14 NOTE — PLAN OF CARE
Problem: Chronic Conditions and Co-morbidities  Goal: Patient's chronic conditions and co-morbidity symptoms are monitored and maintained or improved  Outcome: Progressing  Flowsheets (Taken 11/13/2024 1913)  Care Plan - Patient's Chronic Conditions and Co-Morbidity Symptoms are Monitored and Maintained or Improved:   Monitor and assess patient's chronic conditions and comorbid symptoms for stability, deterioration, or improvement   Collaborate with multidisciplinary team to address chronic and comorbid conditions and prevent exacerbation or deterioration     Problem: Discharge Planning  Goal: Discharge to home or other facility with appropriate resources  Outcome: Progressing  Flowsheets (Taken 11/13/2024 1913)  Discharge to home or other facility with appropriate resources:   Identify barriers to discharge with patient and caregiver   Arrange for needed discharge resources and transportation as appropriate   Identify discharge learning needs (meds, wound care, etc)     Problem: Pain  Goal: Verbalizes/displays adequate comfort level or baseline comfort level  Outcome: Progressing  Flowsheets (Taken 11/13/2024 1913)  Verbalizes/displays adequate comfort level or baseline comfort level:   Encourage patient to monitor pain and request assistance   Assess pain using appropriate pain scale   Administer analgesics based on type and severity of pain and evaluate response   Implement non-pharmacological measures as appropriate and evaluate response     Problem: Safety - Adult  Goal: Free from fall injury  Outcome: Progressing  Flowsheets (Taken 11/13/2024 1913)  Free From Fall Injury: Instruct family/caregiver on patient safety   Care plan reviewed with patient.  Patient verbalized understanding of the plan of care and contribute to goal setting.

## 2024-11-15 VITALS
TEMPERATURE: 98.2 F | WEIGHT: 261 LBS | DIASTOLIC BLOOD PRESSURE: 69 MMHG | SYSTOLIC BLOOD PRESSURE: 143 MMHG | BODY MASS INDEX: 32.45 KG/M2 | RESPIRATION RATE: 18 BRPM | OXYGEN SATURATION: 99 % | HEART RATE: 83 BPM | HEIGHT: 75 IN

## 2024-11-15 PROCEDURE — 97530 THERAPEUTIC ACTIVITIES: CPT

## 2024-11-15 PROCEDURE — 2580000003 HC RX 258: Performed by: ORTHOPAEDIC SURGERY

## 2024-11-15 PROCEDURE — 97116 GAIT TRAINING THERAPY: CPT

## 2024-11-15 PROCEDURE — 6370000000 HC RX 637 (ALT 250 FOR IP): Performed by: ORTHOPAEDIC SURGERY

## 2024-11-15 RX ORDER — HEPARIN 100 UNIT/ML
500 SYRINGE INTRAVENOUS PRN
Status: DISCONTINUED | OUTPATIENT
Start: 2024-11-15 | End: 2024-11-15 | Stop reason: HOSPADM

## 2024-11-15 RX ADMIN — FLUOXETINE 40 MG: 20 CAPSULE ORAL at 08:42

## 2024-11-15 RX ADMIN — AMLODIPINE BESYLATE 5 MG: 5 TABLET ORAL at 08:42

## 2024-11-15 RX ADMIN — PYRIDOSTIGMINE BROMIDE 180 MG: 60 TABLET ORAL at 08:42

## 2024-11-15 RX ADMIN — Medication 500 MG: at 08:42

## 2024-11-15 RX ADMIN — MYCOPHENOLATE MOFETIL 1000 MG: 250 CAPSULE ORAL at 08:42

## 2024-11-15 RX ADMIN — FINASTERIDE 5 MG: 5 TABLET, FILM COATED ORAL at 08:42

## 2024-11-15 RX ADMIN — ASPIRIN 325 MG: 325 TABLET, COATED ORAL at 08:47

## 2024-11-15 RX ADMIN — TRAMADOL HYDROCHLORIDE 50 MG: 50 TABLET ORAL at 08:46

## 2024-11-15 RX ADMIN — FAMOTIDINE 20 MG: 20 TABLET, FILM COATED ORAL at 08:47

## 2024-11-15 RX ADMIN — TAMSULOSIN HYDROCHLORIDE 0.4 MG: 0.4 CAPSULE ORAL at 08:42

## 2024-11-15 RX ADMIN — ATORVASTATIN CALCIUM 40 MG: 40 TABLET, FILM COATED ORAL at 08:42

## 2024-11-15 RX ADMIN — SODIUM CHLORIDE, PRESERVATIVE FREE 10 ML: 5 INJECTION INTRAVENOUS at 09:21

## 2024-11-15 RX ADMIN — HYDROCODONE BITARTRATE AND ACETAMINOPHEN 2 TABLET: 5; 325 TABLET ORAL at 03:59

## 2024-11-15 ASSESSMENT — PAIN SCALES - GENERAL
PAINLEVEL_OUTOF10: 0
PAINLEVEL_OUTOF10: 6
PAINLEVEL_OUTOF10: 0
PAINLEVEL_OUTOF10: 4

## 2024-11-15 ASSESSMENT — PAIN DESCRIPTION - ORIENTATION
ORIENTATION: RIGHT
ORIENTATION: RIGHT

## 2024-11-15 ASSESSMENT — PAIN DESCRIPTION - DESCRIPTORS
DESCRIPTORS: ACHING
DESCRIPTORS: ACHING;DISCOMFORT;SORE

## 2024-11-15 ASSESSMENT — PAIN DESCRIPTION - LOCATION
LOCATION: HIP
LOCATION: HIP

## 2024-11-15 ASSESSMENT — PAIN SCALES - WONG BAKER
WONGBAKER_NUMERICALRESPONSE: NO HURT
WONGBAKER_NUMERICALRESPONSE: NO HURT

## 2024-11-15 NOTE — PLAN OF CARE
Problem: ABCDS Injury Assessment  Goal: Absence of physical injury  11/15/2024 1308 by Ellen Mendoza LPN  Outcome: Completed   Care plan reviewed with patient.  Patient verbalized understanding of the plan of care and contribute to goal setting.

## 2024-11-15 NOTE — CARE COORDINATION
11/15/24, 10:37 AM EST    Patient goals/plan/ treatment preferences discussed by  and .  Patient goals/plan/ treatment preferences reviewed with patient/ family.  Patient/ family verbalize understanding of discharge plan and are in agreement with goal/plan/treatment preferences.  Understanding was demonstrated using the teach back method.  AVS provided by RN at time of discharge, which includes all necessary medical information pertaining to the patients current course of illness, treatment, post-discharge goals of care, and treatment preferences. Spoke with [facility] to update on discharge. Order is placed, they can pull information from chart.     Services At/After Discharge: Home Health

## 2024-11-15 NOTE — PROGRESS NOTES
[facility]  INPATIENT PHYSICAL THERAPY  DAILY NOTE  [facility] ORTHOPEDICS 7K - 7K-03/003-A      Discharge Recommendations: Home with Outpatient PT  Equipment Recommendations: No               Time In: 0758  Time Out: 0821  Timed Code Treatment Minutes: 23 Minutes  Minutes: 23          Date: 11/15/2024  Patient Name: Duane Scheiderer,  Gender:  male        MRN: [id number]  : 1950  (74 y.o.)     Referring Practitioner: Kelby Croft MD  Diagnosis: Primary osteoarthritis of right hip  Additional Pertinent Hx: Per EMR:The patient is a pleasant gentleman who started having increasing pain in his right hip.  He is previously a patient of Dr. 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  He has done quite well, but unfortunately he suffers from quite a bit of comorbidities, lumbar surgery x6, and has neuropathy.  He has had a previous left total hip replacement in remote past, which he has done well with, but he is having increasing pain now on his right hip.  Having difficulty bending down tying shoes, crossing his legs, getting in and out of chairs, walking tolerance has decreased secondary to his hip pain.  He sees a neurologist in [place].  Poorly controlled diabetes.  He is on insulin pump.  He tries to keep his A1c close to 7.  He has had x-rays and an MRI showing severe degenerative joint disease and at this point, he would like to move forth with more definitive treatment after trying activity modification and cryotherapy attempted nonuse as well as ambulatory aids. Pt underwent RIGHT TOTAL HIP, LATERAL APPROACH on 24 by Dr. Croft.     Prior Level of Function:  Lives With: Spouse, Other (comment)  Type of Home: House  Home Layout: One level  Home Access: Stairs to enter with rails  Entrance Stairs - Number of Steps: 4 RUPESH with 1 handrail. Does have a stair lift if patient unable to climb steps.  Entrance Stairs - Rails: Left  Home Equipment:

## 2024-11-15 NOTE — PROGRESS NOTES
AVS reviewed with patient. No concerns voiced. Patient transported via wheelchair with all of his belongings and his walker to main lobby by this nurse. Patients wife transporting him home.

## 2024-12-06 ENCOUNTER — APPOINTMENT (OUTPATIENT)
Dept: GENERAL RADIOLOGY | Age: 74
DRG: 534 | End: 2024-12-06
Payer: MEDICARE

## 2024-12-06 ENCOUNTER — HOSPITAL ENCOUNTER (INPATIENT)
Age: 74
LOS: 4 days | Discharge: NURSING, REHAB OR LONG-TERM CARE | DRG: 534 | End: 2024-12-10
Attending: PHYSICIAN ASSISTANT | Admitting: STUDENT IN AN ORGANIZED HEALTH CARE EDUCATION/TRAINING PROGRAM
Payer: MEDICARE

## 2024-12-06 ENCOUNTER — APPOINTMENT (OUTPATIENT)
Dept: CT IMAGING | Age: 74
DRG: 534 | End: 2024-12-06
Payer: MEDICARE

## 2024-12-06 DIAGNOSIS — R55 SYNCOPE AND COLLAPSE: ICD-10-CM

## 2024-12-06 DIAGNOSIS — S72.344A CLOSED NONDISPLACED SPIRAL FRACTURE OF SHAFT OF RIGHT FEMUR, INITIAL ENCOUNTER (HCC): Primary | ICD-10-CM

## 2024-12-06 PROBLEM — M97.8XXA PERIPROSTHETIC HIP FRACTURE, INITIAL ENCOUNTER: Status: ACTIVE | Noted: 2024-12-06

## 2024-12-06 PROBLEM — Z96.649 PERIPROSTHETIC HIP FRACTURE, INITIAL ENCOUNTER: Status: ACTIVE | Noted: 2024-12-06

## 2024-12-06 LAB
ANION GAP SERPL CALC-SCNC: 12 MEQ/L (ref 8–16)
BASOPHILS ABSOLUTE: 0 THOU/MM3 (ref 0–0.1)
BASOPHILS NFR BLD AUTO: 0.5 %
BUN SERPL-MCNC: 20 MG/DL (ref 7–22)
CALCIUM SERPL-MCNC: 9.3 MG/DL (ref 8.5–10.5)
CHLORIDE SERPL-SCNC: 105 MEQ/L (ref 98–111)
CO2 SERPL-SCNC: 22 MEQ/L (ref 23–33)
CREAT SERPL-MCNC: 0.7 MG/DL (ref 0.4–1.2)
CRP SERPL-MCNC: < 0.3 MG/DL (ref 0–1)
DEPRECATED RDW RBC AUTO: 43.8 FL (ref 35–45)
EKG ATRIAL RATE: 70 BPM
EKG P AXIS: 58 DEGREES
EKG P-R INTERVAL: 190 MS
EKG Q-T INTERVAL: 418 MS
EKG QRS DURATION: 92 MS
EKG QTC CALCULATION (BAZETT): 451 MS
EKG R AXIS: -25 DEGREES
EKG T AXIS: 16 DEGREES
EKG VENTRICULAR RATE: 70 BPM
EOSINOPHIL NFR BLD AUTO: 0.7 %
EOSINOPHILS ABSOLUTE: 0.1 THOU/MM3 (ref 0–0.4)
ERYTHROCYTE [DISTWIDTH] IN BLOOD BY AUTOMATED COUNT: 13.4 % (ref 11.5–14.5)
ERYTHROCYTE [SEDIMENTATION RATE] IN BLOOD BY WESTERGREN METHOD: 25 MM/HR (ref 0–10)
GFR SERPL CREATININE-BSD FRML MDRD: > 90 ML/MIN/1.73M2
GLUCOSE SERPL-MCNC: 203 MG/DL (ref 70–108)
HCT VFR BLD AUTO: 36 % (ref 42–52)
HGB BLD-MCNC: 11.3 GM/DL (ref 14–18)
IMM GRANULOCYTES # BLD AUTO: 0.04 THOU/MM3 (ref 0–0.07)
IMM GRANULOCYTES NFR BLD AUTO: 0.5 %
LYMPHOCYTES ABSOLUTE: 0.4 THOU/MM3 (ref 1–4.8)
LYMPHOCYTES NFR BLD AUTO: 4.9 %
MAGNESIUM SERPL-MCNC: 1.9 MG/DL (ref 1.6–2.4)
MCH RBC QN AUTO: 27.8 PG (ref 26–33)
MCHC RBC AUTO-ENTMCNC: 31.4 GM/DL (ref 32.2–35.5)
MCV RBC AUTO: 88.7 FL (ref 80–94)
MONOCYTES ABSOLUTE: 0.3 THOU/MM3 (ref 0.4–1.3)
MONOCYTES NFR BLD AUTO: 3.9 %
NEUTROPHILS ABSOLUTE: 7.9 THOU/MM3 (ref 1.8–7.7)
NEUTROPHILS NFR BLD AUTO: 89.5 %
NRBC BLD AUTO-RTO: 0 /100 WBC
OSMOLALITY SERPL CALC.SUM OF ELEC: 286 MOSMOL/KG (ref 275–300)
PLATELET # BLD AUTO: 233 THOU/MM3 (ref 130–400)
PMV BLD AUTO: 10.7 FL (ref 9.4–12.4)
POTASSIUM SERPL-SCNC: 3.8 MEQ/L (ref 3.5–5.2)
RBC # BLD AUTO: 4.06 MILL/MM3 (ref 4.7–6.1)
SODIUM SERPL-SCNC: 139 MEQ/L (ref 135–145)
TROPONIN, HIGH SENSITIVITY: 29 NG/L (ref 0–12)
WBC # BLD AUTO: 8.8 THOU/MM3 (ref 4.8–10.8)

## 2024-12-06 PROCEDURE — 71046 X-RAY EXAM CHEST 2 VIEWS: CPT

## 2024-12-06 PROCEDURE — 99285 EMERGENCY DEPT VISIT HI MDM: CPT

## 2024-12-06 PROCEDURE — 72125 CT NECK SPINE W/O DYE: CPT

## 2024-12-06 PROCEDURE — 70450 CT HEAD/BRAIN W/O DYE: CPT

## 2024-12-06 PROCEDURE — 93010 ELECTROCARDIOGRAM REPORT: CPT | Performed by: INTERNAL MEDICINE

## 2024-12-06 PROCEDURE — 73502 X-RAY EXAM HIP UNI 2-3 VIEWS: CPT

## 2024-12-06 PROCEDURE — 2580000003 HC RX 258: Performed by: PHYSICIAN ASSISTANT

## 2024-12-06 PROCEDURE — 6360000002 HC RX W HCPCS: Performed by: PHYSICIAN ASSISTANT

## 2024-12-06 PROCEDURE — 86140 C-REACTIVE PROTEIN: CPT

## 2024-12-06 PROCEDURE — 93005 ELECTROCARDIOGRAM TRACING: CPT | Performed by: PHYSICIAN ASSISTANT

## 2024-12-06 PROCEDURE — 1200000003 HC TELEMETRY R&B

## 2024-12-06 PROCEDURE — 96374 THER/PROPH/DIAG INJ IV PUSH: CPT

## 2024-12-06 PROCEDURE — 85651 RBC SED RATE NONAUTOMATED: CPT

## 2024-12-06 PROCEDURE — 83735 ASSAY OF MAGNESIUM: CPT

## 2024-12-06 PROCEDURE — 99223 1ST HOSP IP/OBS HIGH 75: CPT | Performed by: PHYSICIAN ASSISTANT

## 2024-12-06 PROCEDURE — 6370000000 HC RX 637 (ALT 250 FOR IP): Performed by: PHYSICIAN ASSISTANT

## 2024-12-06 PROCEDURE — 36415 COLL VENOUS BLD VENIPUNCTURE: CPT

## 2024-12-06 PROCEDURE — 73552 X-RAY EXAM OF FEMUR 2/>: CPT

## 2024-12-06 PROCEDURE — 1200000000 HC SEMI PRIVATE

## 2024-12-06 PROCEDURE — 84484 ASSAY OF TROPONIN QUANT: CPT

## 2024-12-06 PROCEDURE — 73700 CT LOWER EXTREMITY W/O DYE: CPT

## 2024-12-06 PROCEDURE — 80048 BASIC METABOLIC PNL TOTAL CA: CPT

## 2024-12-06 PROCEDURE — 96375 TX/PRO/DX INJ NEW DRUG ADDON: CPT

## 2024-12-06 PROCEDURE — 85025 COMPLETE CBC W/AUTO DIFF WBC: CPT

## 2024-12-06 RX ORDER — MORPHINE SULFATE 4 MG/ML
4 INJECTION, SOLUTION INTRAMUSCULAR; INTRAVENOUS
Status: COMPLETED | OUTPATIENT
Start: 2024-12-06 | End: 2024-12-06

## 2024-12-06 RX ORDER — FAMOTIDINE 20 MG/1
20 TABLET, FILM COATED ORAL 2 TIMES DAILY
Status: DISCONTINUED | OUTPATIENT
Start: 2024-12-06 | End: 2024-12-10 | Stop reason: HOSPADM

## 2024-12-06 RX ORDER — ACETAMINOPHEN 650 MG/1
650 SUPPOSITORY RECTAL EVERY 6 HOURS PRN
Status: DISCONTINUED | OUTPATIENT
Start: 2024-12-06 | End: 2024-12-10 | Stop reason: HOSPADM

## 2024-12-06 RX ORDER — MORPHINE SULFATE 2 MG/ML
2 INJECTION, SOLUTION INTRAMUSCULAR; INTRAVENOUS
Status: DISCONTINUED | OUTPATIENT
Start: 2024-12-06 | End: 2024-12-10 | Stop reason: HOSPADM

## 2024-12-06 RX ORDER — POLYETHYLENE GLYCOL 3350 17 G/17G
17 POWDER, FOR SOLUTION ORAL DAILY PRN
Status: DISCONTINUED | OUTPATIENT
Start: 2024-12-06 | End: 2024-12-10 | Stop reason: HOSPADM

## 2024-12-06 RX ORDER — ONDANSETRON 2 MG/ML
4 INJECTION INTRAMUSCULAR; INTRAVENOUS EVERY 6 HOURS PRN
Status: DISCONTINUED | OUTPATIENT
Start: 2024-12-06 | End: 2024-12-10 | Stop reason: HOSPADM

## 2024-12-06 RX ORDER — PYRIDOSTIGMINE BROMIDE 60 MG/1
60 TABLET ORAL
Status: DISCONTINUED | OUTPATIENT
Start: 2024-12-06 | End: 2024-12-10 | Stop reason: HOSPADM

## 2024-12-06 RX ORDER — HYDROCODONE BITARTRATE AND ACETAMINOPHEN 5; 325 MG/1; MG/1
1 TABLET ORAL EVERY 4 HOURS PRN
Status: DISCONTINUED | OUTPATIENT
Start: 2024-12-06 | End: 2024-12-10 | Stop reason: HOSPADM

## 2024-12-06 RX ORDER — SODIUM CHLORIDE 0.9 % (FLUSH) 0.9 %
5-40 SYRINGE (ML) INJECTION PRN
Status: DISCONTINUED | OUTPATIENT
Start: 2024-12-06 | End: 2024-12-10 | Stop reason: HOSPADM

## 2024-12-06 RX ORDER — ONDANSETRON 2 MG/ML
4 INJECTION INTRAMUSCULAR; INTRAVENOUS ONCE
Status: COMPLETED | OUTPATIENT
Start: 2024-12-06 | End: 2024-12-06

## 2024-12-06 RX ORDER — MAGNESIUM SULFATE IN WATER 40 MG/ML
2000 INJECTION, SOLUTION INTRAVENOUS PRN
Status: DISCONTINUED | OUTPATIENT
Start: 2024-12-06 | End: 2024-12-10 | Stop reason: HOSPADM

## 2024-12-06 RX ORDER — FINASTERIDE 5 MG/1
5 TABLET, FILM COATED ORAL DAILY
Status: DISCONTINUED | OUTPATIENT
Start: 2024-12-06 | End: 2024-12-10 | Stop reason: HOSPADM

## 2024-12-06 RX ORDER — POTASSIUM CHLORIDE 1500 MG/1
40 TABLET, EXTENDED RELEASE ORAL PRN
Status: DISCONTINUED | OUTPATIENT
Start: 2024-12-06 | End: 2024-12-10 | Stop reason: HOSPADM

## 2024-12-06 RX ORDER — DEXTROSE MONOHYDRATE 100 MG/ML
INJECTION, SOLUTION INTRAVENOUS CONTINUOUS PRN
Status: DISCONTINUED | OUTPATIENT
Start: 2024-12-06 | End: 2024-12-10 | Stop reason: HOSPADM

## 2024-12-06 RX ORDER — SODIUM CHLORIDE 0.9 % (FLUSH) 0.9 %
5-40 SYRINGE (ML) INJECTION EVERY 12 HOURS SCHEDULED
Status: DISCONTINUED | OUTPATIENT
Start: 2024-12-06 | End: 2024-12-10 | Stop reason: HOSPADM

## 2024-12-06 RX ORDER — ENOXAPARIN SODIUM 100 MG/ML
30 INJECTION SUBCUTANEOUS 2 TIMES DAILY
Status: DISCONTINUED | OUTPATIENT
Start: 2024-12-06 | End: 2024-12-10 | Stop reason: HOSPADM

## 2024-12-06 RX ORDER — ONDANSETRON 4 MG/1
4 TABLET, ORALLY DISINTEGRATING ORAL EVERY 8 HOURS PRN
Status: DISCONTINUED | OUTPATIENT
Start: 2024-12-06 | End: 2024-12-10 | Stop reason: HOSPADM

## 2024-12-06 RX ORDER — TAMSULOSIN HYDROCHLORIDE 0.4 MG/1
0.4 CAPSULE ORAL DAILY
Status: DISCONTINUED | OUTPATIENT
Start: 2024-12-06 | End: 2024-12-10 | Stop reason: HOSPADM

## 2024-12-06 RX ORDER — ROPINIROLE 1 MG/1
2 TABLET, FILM COATED ORAL NIGHTLY
Status: DISCONTINUED | OUTPATIENT
Start: 2024-12-06 | End: 2024-12-10 | Stop reason: HOSPADM

## 2024-12-06 RX ORDER — MORPHINE SULFATE 4 MG/ML
4 INJECTION, SOLUTION INTRAMUSCULAR; INTRAVENOUS
Status: DISCONTINUED | OUTPATIENT
Start: 2024-12-06 | End: 2024-12-10 | Stop reason: HOSPADM

## 2024-12-06 RX ORDER — ORPHENADRINE CITRATE 30 MG/ML
60 INJECTION INTRAMUSCULAR; INTRAVENOUS ONCE
Status: COMPLETED | OUTPATIENT
Start: 2024-12-06 | End: 2024-12-06

## 2024-12-06 RX ORDER — POTASSIUM CHLORIDE 7.45 MG/ML
10 INJECTION INTRAVENOUS PRN
Status: DISCONTINUED | OUTPATIENT
Start: 2024-12-06 | End: 2024-12-10 | Stop reason: HOSPADM

## 2024-12-06 RX ORDER — HYDROCODONE BITARTRATE AND ACETAMINOPHEN 5; 325 MG/1; MG/1
2 TABLET ORAL EVERY 4 HOURS PRN
Status: DISCONTINUED | OUTPATIENT
Start: 2024-12-06 | End: 2024-12-10 | Stop reason: HOSPADM

## 2024-12-06 RX ORDER — ATORVASTATIN CALCIUM 40 MG/1
40 TABLET, FILM COATED ORAL DAILY
Status: DISCONTINUED | OUTPATIENT
Start: 2024-12-06 | End: 2024-12-10 | Stop reason: HOSPADM

## 2024-12-06 RX ORDER — GLUCAGON 1 MG/ML
1 KIT INJECTION PRN
Status: DISCONTINUED | OUTPATIENT
Start: 2024-12-06 | End: 2024-12-06 | Stop reason: SDUPTHER

## 2024-12-06 RX ORDER — SODIUM CHLORIDE 9 MG/ML
INJECTION, SOLUTION INTRAVENOUS PRN
Status: DISCONTINUED | OUTPATIENT
Start: 2024-12-06 | End: 2024-12-10 | Stop reason: HOSPADM

## 2024-12-06 RX ORDER — GLUCAGON 1 MG/ML
1 KIT INJECTION PRN
Status: DISCONTINUED | OUTPATIENT
Start: 2024-12-06 | End: 2024-12-10 | Stop reason: HOSPADM

## 2024-12-06 RX ORDER — MYCOPHENOLATE MOFETIL 250 MG/1
1000 CAPSULE ORAL 2 TIMES DAILY
Status: DISCONTINUED | OUTPATIENT
Start: 2024-12-06 | End: 2024-12-10 | Stop reason: HOSPADM

## 2024-12-06 RX ORDER — DEXTROSE MONOHYDRATE 100 MG/ML
INJECTION, SOLUTION INTRAVENOUS CONTINUOUS PRN
Status: DISCONTINUED | OUTPATIENT
Start: 2024-12-06 | End: 2024-12-06 | Stop reason: SDUPTHER

## 2024-12-06 RX ORDER — AMLODIPINE BESYLATE 5 MG/1
5 TABLET ORAL DAILY
Status: DISCONTINUED | OUTPATIENT
Start: 2024-12-07 | End: 2024-12-10 | Stop reason: HOSPADM

## 2024-12-06 RX ORDER — ACETAMINOPHEN 325 MG/1
650 TABLET ORAL EVERY 6 HOURS PRN
Status: DISCONTINUED | OUTPATIENT
Start: 2024-12-06 | End: 2024-12-10 | Stop reason: HOSPADM

## 2024-12-06 RX ADMIN — CEPHALEXIN 500 MG: 250 CAPSULE ORAL at 17:39

## 2024-12-06 RX ADMIN — SODIUM CHLORIDE, PRESERVATIVE FREE 20 ML: 5 INJECTION INTRAVENOUS at 21:06

## 2024-12-06 RX ADMIN — FAMOTIDINE 20 MG: 20 TABLET, FILM COATED ORAL at 21:00

## 2024-12-06 RX ADMIN — ORPHENADRINE CITRATE 60 MG: 60 INJECTION INTRAMUSCULAR; INTRAVENOUS at 10:33

## 2024-12-06 RX ADMIN — MORPHINE SULFATE 4 MG: 4 INJECTION, SOLUTION INTRAMUSCULAR; INTRAVENOUS at 12:30

## 2024-12-06 RX ADMIN — HYDROCODONE BITARTRATE AND ACETAMINOPHEN 2 TABLET: 5; 325 TABLET ORAL at 20:59

## 2024-12-06 RX ADMIN — FLUOXETINE HYDROCHLORIDE 40 MG: 20 CAPSULE ORAL at 19:48

## 2024-12-06 RX ADMIN — TAMSULOSIN HYDROCHLORIDE 0.4 MG: 0.4 CAPSULE ORAL at 19:48

## 2024-12-06 RX ADMIN — ONDANSETRON 4 MG: 2 INJECTION INTRAMUSCULAR; INTRAVENOUS at 12:30

## 2024-12-06 RX ADMIN — PYRIDOSTIGMINE BROMIDE 60 MG: 60 TABLET ORAL at 19:48

## 2024-12-06 RX ADMIN — HYDROCODONE BITARTRATE AND ACETAMINOPHEN 1 TABLET: 5; 325 TABLET ORAL at 15:53

## 2024-12-06 RX ADMIN — ATORVASTATIN CALCIUM 40 MG: 40 TABLET, FILM COATED ORAL at 19:48

## 2024-12-06 RX ADMIN — ENOXAPARIN SODIUM 30 MG: 100 INJECTION SUBCUTANEOUS at 20:58

## 2024-12-06 RX ADMIN — MYCOPHENOLATE MOFETIL 1000 MG: 250 CAPSULE ORAL at 21:03

## 2024-12-06 RX ADMIN — PYRIDOSTIGMINE BROMIDE 60 MG: 60 TABLET ORAL at 21:03

## 2024-12-06 RX ADMIN — ROPINIROLE HYDROCHLORIDE 2 MG: 1 TABLET, FILM COATED ORAL at 21:03

## 2024-12-06 ASSESSMENT — PAIN DESCRIPTION - DESCRIPTORS
DESCRIPTORS: BURNING
DESCRIPTORS: BURNING
DESCRIPTORS: ACHING;DISCOMFORT

## 2024-12-06 ASSESSMENT — PAIN DESCRIPTION - LOCATION
LOCATION: HIP
LOCATION: HIP;LEG
LOCATION: HIP

## 2024-12-06 ASSESSMENT — PAIN DESCRIPTION - ORIENTATION
ORIENTATION: RIGHT

## 2024-12-06 ASSESSMENT — PAIN SCALES - GENERAL
PAINLEVEL_OUTOF10: 7
PAINLEVEL_OUTOF10: 3
PAINLEVEL_OUTOF10: 2
PAINLEVEL_OUTOF10: 5
PAINLEVEL_OUTOF10: 4
PAINLEVEL_OUTOF10: 5

## 2024-12-06 ASSESSMENT — PAIN - FUNCTIONAL ASSESSMENT: PAIN_FUNCTIONAL_ASSESSMENT: ACTIVITIES ARE NOT PREVENTED

## 2024-12-06 NOTE — ED NOTES
Patient to be transferred to [facility]. Patient is in stable condition at this time. Staff notified prior to transfer.

## 2024-12-06 NOTE — H&P
No trauma at that time.  He continued to have significant pain which limited his ability to ambulate.  This morning, he was on the toilet having a bowel movement and remembers \"grunting.\" He then apparently lost consciousness and fell to the ground.  He reports landing on his right side.  He is brought to the emergency department for further evaluation.  Imaging in the emergency department was suggestive of a right periprosthetic fracture.  Hospitalist service was consulted for admission.  Other than right hip pain and some pain in his right chest wall, the patient has no other complaints at this time.  He reports no history of syncopal episodes in the past.  No antecedent chest pain, palpitations, lightheadedness, dizziness, or vision changes.  No history of seizure disorder.  The patient does have a history of myasthenia gravis and is on medications for this.  No current smoking or alcohol use.  He has a history of obstructive sleep apnea but does not currently use a CPAP.    Review of Systems: Pertinent positives as noted in the HPI. All other systems reviewed and negative.    Past Medical History:        Diagnosis Date    Arthritis     Diabetes mellitus (HCC)     Enlarged prostate     History of blood transfusion     History of thymectomy     Hyperlipidemia     Myasthenia gravis (HCC)     Retention of urine     Sleep apnea     does not use CPAP    Thyroid disease     Torn rotator cuff 2019    right       Past Surgical History:        Procedure Laterality Date    APPENDECTOMY      BACK SURGERY      x6    CARPAL TUNNEL RELEASE      CHOLECYSTECTOMY      JOINT REPLACEMENT Left     hip and knee    REVISION TOTAL KNEE ARTHROPLASTY Left 7/9/2019    FIRST STAGE REVISION LEFT TOTAL KNEE ARTHROPLASTY, ORIF LEFT PATELLA performed by Kelby Croft MD at [facility] OR    REVISION TOTAL KNEE ARTHROPLASTY Left 9/3/2019    SECOND STAGE REVISION LEFT TOTAL KNEE performed by Kelby Croft MD at [facility] OR    SHOULDER SURGERY

## 2024-12-06 NOTE — CONSULTS
Orthopaedic Consult  Patient:  Duane Scheiderer  YOB: 1950  MRN: [id number]     Acct: [id number]    PCP: Katy Chaudhary DO  Date of Admission: 12/6/2024  Date of Service: Pt seen/examined on 12/6/2024     Chief Complaint: R thigh and groin pain  History Of Present Illness: 74 y.o. male who presents with R thigh and groin pain for about two days. Yesterday he went from sitting to standing and had immediate right anterior thigh pain, worsened with weight bearing, relieved by immobilization, no paresthesias, associated muscle spasm, no issue with ROM, no feelings of joint instability. The following morning/ today he sustained a syncopal fall after a bowel movement, causing a fall to his right hip, unclear if hit head, prompting visit to the ED. Head and neck imaging negative for acute process,questionable right proximal femur fracture for which orthopaedics was consulted.     Underwent R NITESH with uncomplicated post operative course with Dr Croft on 11/12/24, working well with cane.     No other msk complaints at this time.       Past Medical History:        Diagnosis Date    Arthritis     Diabetes mellitus (HCC)     Enlarged prostate     History of blood transfusion     History of thymectomy     Hyperlipidemia     Myasthenia gravis (HCC)     Retention of urine     Sleep apnea     does not use CPAP    Thyroid disease     Torn rotator cuff 2019    right       Past Surgical History:        Procedure Laterality Date    APPENDECTOMY      BACK SURGERY      x6    CARPAL TUNNEL RELEASE      CHOLECYSTECTOMY      JOINT REPLACEMENT Left     hip and knee    REVISION TOTAL KNEE ARTHROPLASTY Left 7/9/2019    FIRST STAGE REVISION LEFT TOTAL KNEE ARTHROPLASTY, ORIF LEFT PATELLA performed by Kelby Croft MD at [facility] OR    REVISION TOTAL KNEE ARTHROPLASTY Left 9/3/2019    SECOND STAGE REVISION LEFT TOTAL KNEE performed by Kelby Croft MD at [facility] OR    SHOULDER SURGERY Right 2020    THYMECTOMY

## 2024-12-06 NOTE — ED TRIAGE NOTES
Pt to ED with right hip pain. States that he went to get up from his recliner yesterday when he felt the pain. States that the pain is worse on the inside of the right thigh. No redness or swelling noted. Pt states that today when he was going into the restroom he lost his footing and fell to his knees. Ems states that the patient was on his knees leaning over the tub when they got there to get him.

## 2024-12-06 NOTE — ED NOTES
ED to inpatient nurses report      Chief Complaint:  Chief Complaint   Patient presents with    Hip Pain     Present to ED from: home    MOA:     LOC: alert and orientated to name, place, date  Mobility: Requires assistance * 2; walker, see orders  Oxygen Baseline: 98%    Current needs required: RA     Code Status:   Full Code    What abnormal results were found and what did you give/do to treat them? See CT scan  Any procedures or intervention occur?     Mental Status:  Level of Consciousness: Alert (0)    Psych Assessment:        Vitals:  Patient Vitals for the past 24 hrs:   BP Temp Pulse Resp SpO2 Weight   12/06/24 1428 -- -- 66 16 96 % --   12/06/24 1253 133/63 -- 69 16 98 % --   12/06/24 1153 136/64 -- 72 15 97 % --   12/06/24 0933 (!) 137/58 97.5 °F (36.4 °C) 67 16 100 % 113.4 kg (250 lb)        LDAs:      Ambulatory Status:  No data recorded    Diagnosis:  DISPOSITION Admitted 12/06/2024 12:30:23 PM   Final diagnoses:   Closed nondisplaced spiral fracture of shaft of right femur, initial encounter ([facility])   Syncope and collapse        Consults:  IP CONSULT TO PEDIATRIC ORTHOPEDICS     Pain Score:       C-SSRS:   Risk of Suicide: No Risk    Sepsis Screening:       [place] Fall Risk:       Swallow Screening        Preferred Language:   English      ALLERGIES     Latex, Bee venom, Dilaudid [hydromorphone hcl], Bupropion, Lidocaine, Venlafaxine, Adhesive tape, and Buprenorphine    SURGICAL HISTORY       Past Surgical History:   Procedure Laterality Date    APPENDECTOMY      BACK SURGERY      x6    CARPAL TUNNEL RELEASE      CHOLECYSTECTOMY      JOINT REPLACEMENT Left     hip and knee    REVISION TOTAL KNEE ARTHROPLASTY Left 7/9/2019    FIRST STAGE REVISION LEFT TOTAL KNEE ARTHROPLASTY, ORIF LEFT PATELLA performed by Kelby Croft MD at [facility] OR    REVISION TOTAL KNEE ARTHROPLASTY Left 9/3/2019    SECOND STAGE REVISION LEFT TOTAL KNEE performed by Kelby Croft MD at [facility] OR    SHOULDER SURGERY Right 2020

## 2024-12-06 NOTE — ED NOTES
Pt resting on cot. No distress noted. RR even and unlabored. Call light in reach. Wife at bedside.

## 2024-12-06 NOTE — ED PROVIDER NOTES
Shared Decision-Making was performed and disposition discussed with the        Patient/Family and questions answered           Social determinants of health impacting treatment or disposition: Poor ambulation related to recent surgery and acute fracture         Code Status: Full    Patient's wife also with him in the room.      Summary of Patient Presentation:      MDM  Number of Diagnoses or Management Options  Closed nondisplaced spiral fracture of shaft of right femur, initial encounter ([facility])  Syncope and collapse  Diagnosis management comments: Patient requires CT scanning of the head due to possibility of head injury, patient is not recall the fall fell in the bathroom.  Unwitnessed.  Does not remember.  Age greater than 65.  According MIPS criteria patient needs a CT scan of the head.  Will also add on CT of the cervical spine due to distracting injury.    Note that this patient sounds like he had a syncopal episode on the toilet.  He was having a bowel movement and having a spasm in his right thigh at the same time.  I really do not think this is consistent with pulmonary embolism as the patient has no respiratory distress, is not tachypneic, pulse is 67, SpO2 100% on room air.  Will order a venous Doppler of the right lower extremity.    Patient admitted to hospitalist after having a syncopal episode.  Consultation for orthopedics made patient appears to have a periprosthetic fracture.  Case discussed in detail.  Plan discussed with the patient and his wife.  They agree.    Risk of Complications, Morbidity, and/or Mortality  General comments: Admitted through shared decision making.    Patient Progress  Patient progress: improved    /  ED Course as of 12/06/24 1248   Fri Dec 06, 2024   1134 Troponin elevated at 29.  BMP essentially normal aside from minimally elevated glucose at 203.  Magnesium 1.9, CRP less than 0.30.  Right white blood cell count is normal.  Will plan on consulting orthopedics. [RH]   1137

## 2024-12-07 LAB
ANION GAP SERPL CALC-SCNC: 8 MEQ/L (ref 8–16)
BASOPHILS ABSOLUTE: 0 THOU/MM3 (ref 0–0.1)
BASOPHILS NFR BLD AUTO: 0.6 %
BUN SERPL-MCNC: 23 MG/DL (ref 7–22)
CALCIUM SERPL-MCNC: 9.1 MG/DL (ref 8.5–10.5)
CHLORIDE SERPL-SCNC: 104 MEQ/L (ref 98–111)
CO2 SERPL-SCNC: 25 MEQ/L (ref 23–33)
CREAT SERPL-MCNC: 0.8 MG/DL (ref 0.4–1.2)
DEPRECATED RDW RBC AUTO: 44.9 FL (ref 35–45)
EOSINOPHIL NFR BLD AUTO: 4.5 %
EOSINOPHILS ABSOLUTE: 0.2 THOU/MM3 (ref 0–0.4)
ERYTHROCYTE [DISTWIDTH] IN BLOOD BY AUTOMATED COUNT: 13.7 % (ref 11.5–14.5)
GFR SERPL CREATININE-BSD FRML MDRD: > 90 ML/MIN/1.73M2
GLUCOSE BLD STRIP.AUTO-MCNC: 251 MG/DL (ref 70–108)
GLUCOSE SERPL-MCNC: 139 MG/DL (ref 70–108)
HCT VFR BLD AUTO: 34.5 % (ref 42–52)
HGB BLD-MCNC: 10.7 GM/DL (ref 14–18)
IMM GRANULOCYTES # BLD AUTO: 0.01 THOU/MM3 (ref 0–0.07)
IMM GRANULOCYTES NFR BLD AUTO: 0.2 %
LYMPHOCYTES ABSOLUTE: 1 THOU/MM3 (ref 1–4.8)
LYMPHOCYTES NFR BLD AUTO: 20.3 %
MCH RBC QN AUTO: 27.9 PG (ref 26–33)
MCHC RBC AUTO-ENTMCNC: 31 GM/DL (ref 32.2–35.5)
MCV RBC AUTO: 89.8 FL (ref 80–94)
MONOCYTES ABSOLUTE: 0.5 THOU/MM3 (ref 0.4–1.3)
MONOCYTES NFR BLD AUTO: 9.4 %
NEUTROPHILS ABSOLUTE: 3.3 THOU/MM3 (ref 1.8–7.7)
NEUTROPHILS NFR BLD AUTO: 65 %
NRBC BLD AUTO-RTO: 0 /100 WBC
PLATELET # BLD AUTO: 214 THOU/MM3 (ref 130–400)
PMV BLD AUTO: 10.7 FL (ref 9.4–12.4)
POTASSIUM SERPL-SCNC: 4.1 MEQ/L (ref 3.5–5.2)
RBC # BLD AUTO: 3.84 MILL/MM3 (ref 4.7–6.1)
SODIUM SERPL-SCNC: 137 MEQ/L (ref 135–145)
WBC # BLD AUTO: 5.1 THOU/MM3 (ref 4.8–10.8)

## 2024-12-07 PROCEDURE — 93005 ELECTROCARDIOGRAM TRACING: CPT | Performed by: INTERNAL MEDICINE

## 2024-12-07 PROCEDURE — 36415 COLL VENOUS BLD VENIPUNCTURE: CPT

## 2024-12-07 PROCEDURE — 6370000000 HC RX 637 (ALT 250 FOR IP): Performed by: PHYSICIAN ASSISTANT

## 2024-12-07 PROCEDURE — 97162 PT EVAL MOD COMPLEX 30 MIN: CPT

## 2024-12-07 PROCEDURE — 97530 THERAPEUTIC ACTIVITIES: CPT

## 2024-12-07 PROCEDURE — 99232 SBSQ HOSP IP/OBS MODERATE 35: CPT | Performed by: INTERNAL MEDICINE

## 2024-12-07 PROCEDURE — 1200000000 HC SEMI PRIVATE

## 2024-12-07 PROCEDURE — 97166 OT EVAL MOD COMPLEX 45 MIN: CPT

## 2024-12-07 PROCEDURE — 1200000003 HC TELEMETRY R&B

## 2024-12-07 PROCEDURE — 82948 REAGENT STRIP/BLOOD GLUCOSE: CPT

## 2024-12-07 PROCEDURE — 85025 COMPLETE CBC W/AUTO DIFF WBC: CPT

## 2024-12-07 PROCEDURE — 97535 SELF CARE MNGMENT TRAINING: CPT

## 2024-12-07 PROCEDURE — 80048 BASIC METABOLIC PNL TOTAL CA: CPT

## 2024-12-07 PROCEDURE — 6360000002 HC RX W HCPCS: Performed by: PHYSICIAN ASSISTANT

## 2024-12-07 PROCEDURE — 2580000003 HC RX 258: Performed by: PHYSICIAN ASSISTANT

## 2024-12-07 RX ORDER — CYCLOBENZAPRINE HCL 10 MG
10 TABLET ORAL 3 TIMES DAILY PRN
Status: DISCONTINUED | OUTPATIENT
Start: 2024-12-07 | End: 2024-12-08

## 2024-12-07 RX ADMIN — MYCOPHENOLATE MOFETIL 1000 MG: 250 CAPSULE ORAL at 10:51

## 2024-12-07 RX ADMIN — ENOXAPARIN SODIUM 30 MG: 100 INJECTION SUBCUTANEOUS at 20:18

## 2024-12-07 RX ADMIN — ATORVASTATIN CALCIUM 40 MG: 40 TABLET, FILM COATED ORAL at 10:51

## 2024-12-07 RX ADMIN — PYRIDOSTIGMINE BROMIDE 60 MG: 60 TABLET ORAL at 20:11

## 2024-12-07 RX ADMIN — PYRIDOSTIGMINE BROMIDE 60 MG: 60 TABLET ORAL at 06:13

## 2024-12-07 RX ADMIN — ROPINIROLE HYDROCHLORIDE 2 MG: 1 TABLET, FILM COATED ORAL at 20:10

## 2024-12-07 RX ADMIN — CEPHALEXIN 500 MG: 250 CAPSULE ORAL at 06:14

## 2024-12-07 RX ADMIN — ENOXAPARIN SODIUM 30 MG: 100 INJECTION SUBCUTANEOUS at 10:51

## 2024-12-07 RX ADMIN — FLUOXETINE HYDROCHLORIDE 40 MG: 20 CAPSULE ORAL at 10:51

## 2024-12-07 RX ADMIN — CYCLOBENZAPRINE 10 MG: 10 TABLET, FILM COATED ORAL at 20:18

## 2024-12-07 RX ADMIN — TAMSULOSIN HYDROCHLORIDE 0.4 MG: 0.4 CAPSULE ORAL at 10:51

## 2024-12-07 RX ADMIN — AMLODIPINE BESYLATE 5 MG: 5 TABLET ORAL at 10:51

## 2024-12-07 RX ADMIN — SODIUM CHLORIDE, PRESERVATIVE FREE 10 ML: 5 INJECTION INTRAVENOUS at 20:11

## 2024-12-07 RX ADMIN — PYRIDOSTIGMINE BROMIDE 60 MG: 60 TABLET ORAL at 10:51

## 2024-12-07 RX ADMIN — HYDROCODONE BITARTRATE AND ACETAMINOPHEN 2 TABLET: 5; 325 TABLET ORAL at 00:53

## 2024-12-07 RX ADMIN — CEPHALEXIN 500 MG: 250 CAPSULE ORAL at 12:06

## 2024-12-07 RX ADMIN — FINASTERIDE 5 MG: 5 TABLET, FILM COATED ORAL at 10:51

## 2024-12-07 RX ADMIN — CEPHALEXIN 500 MG: 250 CAPSULE ORAL at 00:54

## 2024-12-07 RX ADMIN — MYCOPHENOLATE MOFETIL 1000 MG: 250 CAPSULE ORAL at 20:10

## 2024-12-07 RX ADMIN — HYDROCODONE BITARTRATE AND ACETAMINOPHEN 2 TABLET: 5; 325 TABLET ORAL at 06:14

## 2024-12-07 RX ADMIN — CEPHALEXIN 500 MG: 250 CAPSULE ORAL at 17:19

## 2024-12-07 RX ADMIN — PYRIDOSTIGMINE BROMIDE 60 MG: 60 TABLET ORAL at 15:23

## 2024-12-07 RX ADMIN — PYRIDOSTIGMINE BROMIDE 60 MG: 60 TABLET ORAL at 18:49

## 2024-12-07 RX ADMIN — PYRIDOSTIGMINE BROMIDE 60 MG: 60 TABLET ORAL at 13:29

## 2024-12-07 RX ADMIN — HYDROCODONE BITARTRATE AND ACETAMINOPHEN 1 TABLET: 5; 325 TABLET ORAL at 18:50

## 2024-12-07 RX ADMIN — CEPHALEXIN 500 MG: 250 CAPSULE ORAL at 23:01

## 2024-12-07 ASSESSMENT — PAIN - FUNCTIONAL ASSESSMENT
PAIN_FUNCTIONAL_ASSESSMENT: PREVENTS OR INTERFERES SOME ACTIVE ACTIVITIES AND ADLS
PAIN_FUNCTIONAL_ASSESSMENT: ACTIVITIES ARE NOT PREVENTED
PAIN_FUNCTIONAL_ASSESSMENT: ACTIVITIES ARE NOT PREVENTED
PAIN_FUNCTIONAL_ASSESSMENT: PREVENTS OR INTERFERES SOME ACTIVE ACTIVITIES AND ADLS

## 2024-12-07 ASSESSMENT — PAIN SCALES - GENERAL
PAINLEVEL_OUTOF10: 1
PAINLEVEL_OUTOF10: 0
PAINLEVEL_OUTOF10: 1
PAINLEVEL_OUTOF10: 2
PAINLEVEL_OUTOF10: 3
PAINLEVEL_OUTOF10: 7
PAINLEVEL_OUTOF10: 5
PAINLEVEL_OUTOF10: 3
PAINLEVEL_OUTOF10: 5
PAINLEVEL_OUTOF10: 3

## 2024-12-07 ASSESSMENT — PAIN DESCRIPTION - LOCATION
LOCATION: ABDOMEN;HIP
LOCATION: HIP;RIB CAGE
LOCATION: HIP
LOCATION: ABDOMEN;HIP

## 2024-12-07 ASSESSMENT — PAIN DESCRIPTION - ORIENTATION
ORIENTATION: RIGHT

## 2024-12-07 ASSESSMENT — PAIN DESCRIPTION - DESCRIPTORS
DESCRIPTORS: ACHING;DISCOMFORT
DESCRIPTORS: SHARP
DESCRIPTORS: ACHING
DESCRIPTORS: ACHING;DISCOMFORT

## 2024-12-07 NOTE — PLAN OF CARE
Problem: Chronic Conditions and Co-morbidities  Goal: Patient's chronic conditions and co-morbidity symptoms are monitored and maintained or improved  Outcome: Progressing     Problem: Discharge Planning  Goal: Discharge to home or other facility with appropriate resources  Outcome: Progressing    Problem: Pain  Goal: Verbalizes/displays adequate comfort level or baseline comfort level  Outcome: Progressing     Problem: Safety - Adult  Goal: Free from fall injury  Outcome: Progressing

## 2024-12-07 NOTE — PLAN OF CARE
Problem: Chronic Conditions and Co-morbidities  Goal: Patient's chronic conditions and co-morbidity symptoms are monitored and maintained or improved  Outcome: Progressing  Flowsheets (Taken 12/7/2024 1803)  Care Plan - Patient's Chronic Conditions and Co-Morbidity Symptoms are Monitored and Maintained or Improved: Monitor and assess patient's chronic conditions and comorbid symptoms for stability, deterioration, or improvement     Problem: Discharge Planning  Goal: Discharge to home or other facility with appropriate resources  Outcome: Progressing  Flowsheets (Taken 12/7/2024 1803)  Discharge to home or other facility with appropriate resources: Identify barriers to discharge with patient and caregiver     Problem: Pain  Goal: Verbalizes/displays adequate comfort level or baseline comfort level  Outcome: Progressing  Flowsheets (Taken 12/7/2024 1803)  Verbalizes/displays adequate comfort level or baseline comfort level: Assess pain using appropriate pain scale     Problem: Safety - Adult  Goal: Free from fall injury  Outcome: Progressing  Flowsheets (Taken 12/7/2024 1803)  Free From Fall Injury: Instruct family/caregiver on patient safety   Care plan reviewed with patient.  Patient verbalize understanding of the plan of care and contribute to goal setting.

## 2024-12-07 NOTE — PLAN OF CARE
Reviewed CT R hip with patient  -no immediate plans for surgical intervention at this time, will mobilize and see how tolerates, possible post mobilization films Monday   -continue TTWB RLE, work with PT OT today, multimodal pain control   -lovenox while in house  -patient likely to need SNF, will follow while in house    Elva Molina PA-C

## 2024-12-08 LAB
ANION GAP SERPL CALC-SCNC: 12 MEQ/L (ref 8–16)
BASOPHILS ABSOLUTE: 0 THOU/MM3 (ref 0–0.1)
BASOPHILS NFR BLD AUTO: 0.8 %
BUN SERPL-MCNC: 17 MG/DL (ref 7–22)
CALCIUM SERPL-MCNC: 9.1 MG/DL (ref 8.5–10.5)
CHLORIDE SERPL-SCNC: 106 MEQ/L (ref 98–111)
CO2 SERPL-SCNC: 23 MEQ/L (ref 23–33)
CREAT SERPL-MCNC: 0.8 MG/DL (ref 0.4–1.2)
CRP SERPL-MCNC: < 0.3 MG/DL (ref 0–1)
DEPRECATED RDW RBC AUTO: 44.1 FL (ref 35–45)
EOSINOPHIL NFR BLD AUTO: 6.9 %
EOSINOPHILS ABSOLUTE: 0.3 THOU/MM3 (ref 0–0.4)
ERYTHROCYTE [DISTWIDTH] IN BLOOD BY AUTOMATED COUNT: 13.6 % (ref 11.5–14.5)
ERYTHROCYTE [SEDIMENTATION RATE] IN BLOOD BY WESTERGREN METHOD: 19 MM/HR (ref 0–10)
GFR SERPL CREATININE-BSD FRML MDRD: > 90 ML/MIN/1.73M2
GLUCOSE SERPL-MCNC: 120 MG/DL (ref 70–108)
HCT VFR BLD AUTO: 34.8 % (ref 42–52)
HGB BLD-MCNC: 10.9 GM/DL (ref 14–18)
IMM GRANULOCYTES # BLD AUTO: 0.01 THOU/MM3 (ref 0–0.07)
IMM GRANULOCYTES NFR BLD AUTO: 0.2 %
LYMPHOCYTES ABSOLUTE: 1 THOU/MM3 (ref 1–4.8)
LYMPHOCYTES NFR BLD AUTO: 21 %
MCH RBC QN AUTO: 27.9 PG (ref 26–33)
MCHC RBC AUTO-ENTMCNC: 31.3 GM/DL (ref 32.2–35.5)
MCV RBC AUTO: 89.2 FL (ref 80–94)
MONOCYTES ABSOLUTE: 0.4 THOU/MM3 (ref 0.4–1.3)
MONOCYTES NFR BLD AUTO: 9 %
NEUTROPHILS ABSOLUTE: 3 THOU/MM3 (ref 1.8–7.7)
NEUTROPHILS NFR BLD AUTO: 62.1 %
NRBC BLD AUTO-RTO: 0 /100 WBC
PLATELET # BLD AUTO: 219 THOU/MM3 (ref 130–400)
PMV BLD AUTO: 10.9 FL (ref 9.4–12.4)
POTASSIUM SERPL-SCNC: 3.9 MEQ/L (ref 3.5–5.2)
RBC # BLD AUTO: 3.9 MILL/MM3 (ref 4.7–6.1)
SODIUM SERPL-SCNC: 141 MEQ/L (ref 135–145)
WBC # BLD AUTO: 4.8 THOU/MM3 (ref 4.8–10.8)

## 2024-12-08 PROCEDURE — 86140 C-REACTIVE PROTEIN: CPT

## 2024-12-08 PROCEDURE — 6370000000 HC RX 637 (ALT 250 FOR IP): Performed by: INTERNAL MEDICINE

## 2024-12-08 PROCEDURE — 99222 1ST HOSP IP/OBS MODERATE 55: CPT | Performed by: PHYSICAL MEDICINE & REHABILITATION

## 2024-12-08 PROCEDURE — 36415 COLL VENOUS BLD VENIPUNCTURE: CPT

## 2024-12-08 PROCEDURE — 6370000000 HC RX 637 (ALT 250 FOR IP): Performed by: PHYSICIAN ASSISTANT

## 2024-12-08 PROCEDURE — 99232 SBSQ HOSP IP/OBS MODERATE 35: CPT | Performed by: INTERNAL MEDICINE

## 2024-12-08 PROCEDURE — 6360000002 HC RX W HCPCS: Performed by: PHYSICIAN ASSISTANT

## 2024-12-08 PROCEDURE — 1200000000 HC SEMI PRIVATE

## 2024-12-08 PROCEDURE — 2580000003 HC RX 258: Performed by: PHYSICIAN ASSISTANT

## 2024-12-08 PROCEDURE — 80048 BASIC METABOLIC PNL TOTAL CA: CPT

## 2024-12-08 PROCEDURE — 85651 RBC SED RATE NONAUTOMATED: CPT

## 2024-12-08 PROCEDURE — 85025 COMPLETE CBC W/AUTO DIFF WBC: CPT

## 2024-12-08 RX ORDER — CYCLOBENZAPRINE HCL 10 MG
5 TABLET ORAL 3 TIMES DAILY PRN
Status: DISCONTINUED | OUTPATIENT
Start: 2024-12-08 | End: 2024-12-10 | Stop reason: HOSPADM

## 2024-12-08 RX ADMIN — ENOXAPARIN SODIUM 30 MG: 100 INJECTION SUBCUTANEOUS at 08:31

## 2024-12-08 RX ADMIN — CEPHALEXIN 500 MG: 250 CAPSULE ORAL at 06:05

## 2024-12-08 RX ADMIN — CEPHALEXIN 500 MG: 250 CAPSULE ORAL at 23:48

## 2024-12-08 RX ADMIN — PYRIDOSTIGMINE BROMIDE 60 MG: 60 TABLET ORAL at 15:37

## 2024-12-08 RX ADMIN — PYRIDOSTIGMINE BROMIDE 60 MG: 60 TABLET ORAL at 10:12

## 2024-12-08 RX ADMIN — FAMOTIDINE 20 MG: 20 TABLET, FILM COATED ORAL at 08:31

## 2024-12-08 RX ADMIN — CEPHALEXIN 500 MG: 250 CAPSULE ORAL at 17:50

## 2024-12-08 RX ADMIN — SODIUM CHLORIDE, PRESERVATIVE FREE 10 ML: 5 INJECTION INTRAVENOUS at 20:47

## 2024-12-08 RX ADMIN — TAMSULOSIN HYDROCHLORIDE 0.4 MG: 0.4 CAPSULE ORAL at 08:30

## 2024-12-08 RX ADMIN — PYRIDOSTIGMINE BROMIDE 60 MG: 60 TABLET ORAL at 21:57

## 2024-12-08 RX ADMIN — ENOXAPARIN SODIUM 30 MG: 100 INJECTION SUBCUTANEOUS at 20:48

## 2024-12-08 RX ADMIN — FAMOTIDINE 20 MG: 20 TABLET, FILM COATED ORAL at 20:48

## 2024-12-08 RX ADMIN — CEPHALEXIN 500 MG: 250 CAPSULE ORAL at 11:31

## 2024-12-08 RX ADMIN — ATORVASTATIN CALCIUM 40 MG: 40 TABLET, FILM COATED ORAL at 08:31

## 2024-12-08 RX ADMIN — PYRIDOSTIGMINE BROMIDE 60 MG: 60 TABLET ORAL at 19:11

## 2024-12-08 RX ADMIN — FLUOXETINE HYDROCHLORIDE 40 MG: 20 CAPSULE ORAL at 08:31

## 2024-12-08 RX ADMIN — PYRIDOSTIGMINE BROMIDE 60 MG: 60 TABLET ORAL at 13:47

## 2024-12-08 RX ADMIN — MYCOPHENOLATE MOFETIL 1000 MG: 250 CAPSULE ORAL at 20:49

## 2024-12-08 RX ADMIN — CYCLOBENZAPRINE 5 MG: 10 TABLET, FILM COATED ORAL at 20:48

## 2024-12-08 RX ADMIN — AMLODIPINE BESYLATE 5 MG: 5 TABLET ORAL at 08:31

## 2024-12-08 RX ADMIN — SODIUM CHLORIDE, PRESERVATIVE FREE 10 ML: 5 INJECTION INTRAVENOUS at 08:31

## 2024-12-08 RX ADMIN — FINASTERIDE 5 MG: 5 TABLET, FILM COATED ORAL at 08:31

## 2024-12-08 RX ADMIN — MYCOPHENOLATE MOFETIL 1000 MG: 250 CAPSULE ORAL at 08:30

## 2024-12-08 RX ADMIN — PYRIDOSTIGMINE BROMIDE 60 MG: 60 TABLET ORAL at 06:05

## 2024-12-08 RX ADMIN — ROPINIROLE HYDROCHLORIDE 2 MG: 1 TABLET, FILM COATED ORAL at 20:48

## 2024-12-08 ASSESSMENT — PAIN DESCRIPTION - LOCATION
LOCATION: HIP;RIB CAGE
LOCATION: LEG

## 2024-12-08 ASSESSMENT — PAIN DESCRIPTION - FREQUENCY: FREQUENCY: CONTINUOUS

## 2024-12-08 ASSESSMENT — PAIN SCALES - GENERAL
PAINLEVEL_OUTOF10: 3
PAINLEVEL_OUTOF10: 3
PAINLEVEL_OUTOF10: 5

## 2024-12-08 ASSESSMENT — PAIN - FUNCTIONAL ASSESSMENT
PAIN_FUNCTIONAL_ASSESSMENT: ACTIVITIES ARE NOT PREVENTED
PAIN_FUNCTIONAL_ASSESSMENT: PREVENTS OR INTERFERES SOME ACTIVE ACTIVITIES AND ADLS

## 2024-12-08 ASSESSMENT — PAIN DESCRIPTION - ORIENTATION
ORIENTATION: RIGHT
ORIENTATION: RIGHT

## 2024-12-08 ASSESSMENT — PAIN DESCRIPTION - ONSET: ONSET: ON-GOING

## 2024-12-08 ASSESSMENT — PAIN DESCRIPTION - DESCRIPTORS: DESCRIPTORS: ACHING;DISCOMFORT

## 2024-12-08 ASSESSMENT — PAIN DESCRIPTION - PAIN TYPE: TYPE: ACUTE PAIN

## 2024-12-08 NOTE — PLAN OF CARE
Repeat evaluation RLE:  -Mobilization efforts good, did well with therapy, pain to the right hip is well controlled and improving  -noted abdominal pain last evening, generally feeling unwell compared to examination yesterday and upon arrival to the ED.   -some increased erythema to the right lateral incision, he was on po abx as outpatient for incisional drainage which was improving, will get baseline sed crp and follow  -possible IPR admission, would wait until patient clinical picture improves until discharge    Ortho to follow    Elva Molina PA-C

## 2024-12-08 NOTE — PLAN OF CARE
Problem: Chronic Conditions and Co-morbidities  Goal: Patient's chronic conditions and co-morbidity symptoms are monitored and maintained or improved  Outcome: Progressing  Flowsheets (Taken 12/7/2024 2000 by Wilton Mchugh, RN)  Care Plan - Patient's Chronic Conditions and Co-Morbidity Symptoms are Monitored and Maintained or Improved: Monitor and assess patient's chronic conditions and comorbid symptoms for stability, deterioration, or improvement     Problem: Discharge Planning  Goal: Discharge to home or other facility with appropriate resources  Outcome: Progressing  Flowsheets (Taken 12/7/2024 2000 by Wilton Mchugh RN)  Discharge to home or other facility with appropriate resources: Identify barriers to discharge with patient and caregiver     Problem: Pain  Goal: Verbalizes/displays adequate comfort level or baseline comfort level  Outcome: Progressing  Flowsheets (Taken 12/7/2024 2000 by Wilton Mchugh RN)  Verbalizes/displays adequate comfort level or baseline comfort level:   Assess pain using appropriate pain scale   Encourage patient to monitor pain and request assistance     Problem: Safety - Adult  Goal: Free from fall injury  Outcome: Progressing  Flowsheets (Taken 12/7/2024 1803 by Barbra Rausch, RN)  Free From Fall Injury: Instruct family/caregiver on patient safety     Care plan reviewed with patient. Patient verbalized understanding of the plan of care and contribute to goal setting.

## 2024-12-08 NOTE — PROCEDURES
PROCEDURE NOTE  Date: 12/7/2024   Name: Duane Scheiderer  YOB: 1950    Procedures        EKG was completed and handed to RN

## 2024-12-08 NOTE — CONSULTS
Physical Medicine & Rehabilitation Consultation Note      Admitting Physician: Solomon Ventura MD    Primary Care Provider: Katy Chaudhary DO     Reason for Consult: Inpatient rehab as recommended by therapist    History of Present Illness:  Duane Scheiderer is a 74 y.o. 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      The patient says he developed gradual onset right hip and groin pain starting in late 2022 to early 2023.  The painful symptom progressively worsened and occasionally radiating down to his right lower extremity foot area.  He says the pain was particularly worse when he weightbearing and walk on his right lower extremity.  An x-ray of right hip done at [facility] on 12/7/2023 was reported to show mild osteoarthritic change of right hip.  Because of worsening symptoms he had right hip MRI done at the [facility] on 8/29/2024 and revealed severe right hip osteoarthritis with full-thickness chondral defect, moderate joint effusion, and degenerative signal and fraying of the acetabular labrum with paralabral cyst.  Therefore right hip replacement surgery was recommended.  The patient then was admitted to [facility]

## 2024-12-08 NOTE — PLAN OF CARE
Problem: Chronic Conditions and Co-morbidities  Goal: Patient's chronic conditions and co-morbidity symptoms are monitored and maintained or improved  12/7/2024 2325 by Wilton Mchugh RN  Outcome: Progressing  Flowsheets (Taken 12/7/2024 2000)  Care Plan - Patient's Chronic Conditions and Co-Morbidity Symptoms are Monitored and Maintained or Improved: Monitor and assess patient's chronic conditions and comorbid symptoms for stability, deterioration, or improvement  12/7/2024 1803 by Barbra Rausch RN  Outcome: Progressing  Flowsheets (Taken 12/7/2024 1803)  Care Plan - Patient's Chronic Conditions and Co-Morbidity Symptoms are Monitored and Maintained or Improved: Monitor and assess patient's chronic conditions and comorbid symptoms for stability, deterioration, or improvement     Problem: Discharge Planning  Goal: Discharge to home or other facility with appropriate resources  12/7/2024 2325 by Wilton Mchugh RN  Outcome: Progressing  Flowsheets (Taken 12/7/2024 2000)  Discharge to home or other facility with appropriate resources: Identify barriers to discharge with patient and caregiver  12/7/2024 1803 by Barbra Rausch RN  Outcome: Progressing  Flowsheets (Taken 12/7/2024 1803)  Discharge to home or other facility with appropriate resources: Identify barriers to discharge with patient and caregiver     Problem: Pain  Goal: Verbalizes/displays adequate comfort level or baseline comfort level  12/7/2024 2325 by Wilton Mchugh RN  Outcome: Progressing  Flowsheets (Taken 12/7/2024 2000)  Verbalizes/displays adequate comfort level or baseline comfort level:   Assess pain using appropriate pain scale   Encourage patient to monitor pain and request assistance  12/7/2024 1803 by Barbra Rausch RN  Outcome: Progressing  Flowsheets (Taken 12/7/2024 1803)  Verbalizes/displays adequate comfort level or baseline comfort level: Assess pain using appropriate pain scale     Problem: Safety - Adult  Goal: Free from

## 2024-12-09 ENCOUNTER — APPOINTMENT (OUTPATIENT)
Dept: GENERAL RADIOLOGY | Age: 74
DRG: 534 | End: 2024-12-09
Payer: MEDICARE

## 2024-12-09 LAB
EKG ATRIAL RATE: 82 BPM
EKG P AXIS: 40 DEGREES
EKG P-R INTERVAL: 160 MS
EKG Q-T INTERVAL: 370 MS
EKG QRS DURATION: 88 MS
EKG QTC CALCULATION (BAZETT): 432 MS
EKG R AXIS: -34 DEGREES
EKG T AXIS: 17 DEGREES
EKG VENTRICULAR RATE: 82 BPM
GLUCOSE BLD STRIP.AUTO-MCNC: 219 MG/DL (ref 70–108)

## 2024-12-09 PROCEDURE — 6370000000 HC RX 637 (ALT 250 FOR IP): Performed by: PHYSICIAN ASSISTANT

## 2024-12-09 PROCEDURE — 97530 THERAPEUTIC ACTIVITIES: CPT

## 2024-12-09 PROCEDURE — 73552 X-RAY EXAM OF FEMUR 2/>: CPT

## 2024-12-09 PROCEDURE — 2580000003 HC RX 258: Performed by: PHYSICIAN ASSISTANT

## 2024-12-09 PROCEDURE — 82948 REAGENT STRIP/BLOOD GLUCOSE: CPT

## 2024-12-09 PROCEDURE — 97110 THERAPEUTIC EXERCISES: CPT

## 2024-12-09 PROCEDURE — 99232 SBSQ HOSP IP/OBS MODERATE 35: CPT | Performed by: NURSE PRACTITIONER

## 2024-12-09 PROCEDURE — 6370000000 HC RX 637 (ALT 250 FOR IP): Performed by: NURSE PRACTITIONER

## 2024-12-09 PROCEDURE — 6360000002 HC RX W HCPCS: Performed by: PHYSICIAN ASSISTANT

## 2024-12-09 PROCEDURE — 1200000000 HC SEMI PRIVATE

## 2024-12-09 PROCEDURE — 93010 ELECTROCARDIOGRAM REPORT: CPT | Performed by: INTERNAL MEDICINE

## 2024-12-09 PROCEDURE — 97116 GAIT TRAINING THERAPY: CPT

## 2024-12-09 RX ORDER — ACETAMINOPHEN 325 MG/1
650 TABLET ORAL EVERY 6 HOURS
Status: CANCELLED | OUTPATIENT
Start: 2024-12-09

## 2024-12-09 RX ORDER — POLYETHYLENE GLYCOL 3350 17 G/17G
17 POWDER, FOR SOLUTION ORAL DAILY PRN
Status: CANCELLED | OUTPATIENT
Start: 2024-12-09

## 2024-12-09 RX ORDER — BISACODYL 10 MG
10 SUPPOSITORY, RECTAL RECTAL DAILY PRN
Status: CANCELLED | OUTPATIENT
Start: 2024-12-09

## 2024-12-09 RX ORDER — AMLODIPINE BESYLATE 5 MG/1
5 TABLET ORAL DAILY
Status: CANCELLED | OUTPATIENT
Start: 2024-12-10

## 2024-12-09 RX ORDER — ACETAMINOPHEN 325 MG/1
650 TABLET ORAL EVERY 4 HOURS PRN
Status: CANCELLED | OUTPATIENT
Start: 2024-12-09

## 2024-12-09 RX ORDER — MYCOPHENOLATE MOFETIL 250 MG/1
1000 CAPSULE ORAL 2 TIMES DAILY
Status: CANCELLED | OUTPATIENT
Start: 2024-12-09

## 2024-12-09 RX ORDER — GLUCAGON 1 MG/ML
1 KIT INJECTION PRN
Status: CANCELLED | OUTPATIENT
Start: 2024-12-09

## 2024-12-09 RX ORDER — TAMSULOSIN HYDROCHLORIDE 0.4 MG/1
0.4 CAPSULE ORAL DAILY
Status: CANCELLED | OUTPATIENT
Start: 2024-12-10

## 2024-12-09 RX ORDER — ROPINIROLE 1 MG/1
2 TABLET, FILM COATED ORAL NIGHTLY
Status: CANCELLED | OUTPATIENT
Start: 2024-12-09

## 2024-12-09 RX ORDER — DOCUSATE SODIUM 100 MG/1
100 CAPSULE, LIQUID FILLED ORAL 2 TIMES DAILY
Status: CANCELLED | OUTPATIENT
Start: 2024-12-09

## 2024-12-09 RX ORDER — TRAZODONE HYDROCHLORIDE 50 MG/1
50 TABLET, FILM COATED ORAL NIGHTLY PRN
Status: CANCELLED | OUTPATIENT
Start: 2024-12-09

## 2024-12-09 RX ORDER — MAGNESIUM SULFATE IN WATER 40 MG/ML
2000 INJECTION, SOLUTION INTRAVENOUS PRN
Status: CANCELLED | OUTPATIENT
Start: 2024-12-09

## 2024-12-09 RX ORDER — FINASTERIDE 5 MG/1
5 TABLET, FILM COATED ORAL DAILY
Status: CANCELLED | OUTPATIENT
Start: 2024-12-10

## 2024-12-09 RX ORDER — ERGOCALCIFEROL 1.25 MG/1
50000 CAPSULE, LIQUID FILLED ORAL WEEKLY
Status: CANCELLED | OUTPATIENT
Start: 2024-12-09

## 2024-12-09 RX ORDER — CYCLOBENZAPRINE HCL 10 MG
5 TABLET ORAL 3 TIMES DAILY PRN
Status: CANCELLED | OUTPATIENT
Start: 2024-12-09

## 2024-12-09 RX ORDER — ONDANSETRON 4 MG/1
4 TABLET, ORALLY DISINTEGRATING ORAL EVERY 8 HOURS PRN
Status: CANCELLED | OUTPATIENT
Start: 2024-12-09

## 2024-12-09 RX ORDER — PYRIDOSTIGMINE BROMIDE 60 MG/1
60 TABLET ORAL
Status: CANCELLED | OUTPATIENT
Start: 2024-12-09

## 2024-12-09 RX ORDER — POTASSIUM CHLORIDE 7.45 MG/ML
10 INJECTION INTRAVENOUS PRN
Status: CANCELLED | OUTPATIENT
Start: 2024-12-09

## 2024-12-09 RX ORDER — SODIUM CHLORIDE 0.9 % (FLUSH) 0.9 %
5-40 SYRINGE (ML) INJECTION EVERY 12 HOURS SCHEDULED
Status: CANCELLED | OUTPATIENT
Start: 2024-12-09

## 2024-12-09 RX ORDER — CEPHALEXIN 500 MG/1
500 CAPSULE ORAL EVERY 6 HOURS SCHEDULED
Status: CANCELLED | OUTPATIENT
Start: 2024-12-09 | End: 2024-12-10

## 2024-12-09 RX ORDER — ENOXAPARIN SODIUM 100 MG/ML
30 INJECTION SUBCUTANEOUS 2 TIMES DAILY
Status: CANCELLED | OUTPATIENT
Start: 2024-12-09

## 2024-12-09 RX ORDER — OXYCODONE HYDROCHLORIDE 5 MG/1
2.5 TABLET ORAL EVERY 4 HOURS PRN
Status: CANCELLED | OUTPATIENT
Start: 2024-12-09

## 2024-12-09 RX ORDER — SENNOSIDES A AND B 8.6 MG/1
1 TABLET, FILM COATED ORAL NIGHTLY
Status: CANCELLED | OUTPATIENT
Start: 2024-12-09

## 2024-12-09 RX ORDER — DEXTROSE MONOHYDRATE 100 MG/ML
INJECTION, SOLUTION INTRAVENOUS CONTINUOUS PRN
Status: CANCELLED | OUTPATIENT
Start: 2024-12-09

## 2024-12-09 RX ORDER — DOCUSATE SODIUM 100 MG/1
100 CAPSULE, LIQUID FILLED ORAL 2 TIMES DAILY
Status: DISCONTINUED | OUTPATIENT
Start: 2024-12-09 | End: 2024-12-10 | Stop reason: HOSPADM

## 2024-12-09 RX ORDER — POTASSIUM CHLORIDE 1500 MG/1
40 TABLET, EXTENDED RELEASE ORAL PRN
Status: CANCELLED | OUTPATIENT
Start: 2024-12-09

## 2024-12-09 RX ORDER — SODIUM CHLORIDE 9 MG/ML
INJECTION, SOLUTION INTRAVENOUS PRN
Status: CANCELLED | OUTPATIENT
Start: 2024-12-09

## 2024-12-09 RX ORDER — OXYCODONE HYDROCHLORIDE 5 MG/1
5 TABLET ORAL EVERY 4 HOURS PRN
Status: CANCELLED | OUTPATIENT
Start: 2024-12-09

## 2024-12-09 RX ORDER — ATORVASTATIN CALCIUM 40 MG/1
40 TABLET, FILM COATED ORAL DAILY
Status: CANCELLED | OUTPATIENT
Start: 2024-12-10

## 2024-12-09 RX ORDER — FAMOTIDINE 20 MG/1
20 TABLET, FILM COATED ORAL 2 TIMES DAILY
Status: CANCELLED | OUTPATIENT
Start: 2024-12-09

## 2024-12-09 RX ORDER — SODIUM CHLORIDE 0.9 % (FLUSH) 0.9 %
5-40 SYRINGE (ML) INJECTION PRN
Status: CANCELLED | OUTPATIENT
Start: 2024-12-09

## 2024-12-09 RX ADMIN — PYRIDOSTIGMINE BROMIDE 60 MG: 60 TABLET ORAL at 23:11

## 2024-12-09 RX ADMIN — SODIUM CHLORIDE, PRESERVATIVE FREE 10 ML: 5 INJECTION INTRAVENOUS at 19:43

## 2024-12-09 RX ADMIN — AMLODIPINE BESYLATE 5 MG: 5 TABLET ORAL at 08:22

## 2024-12-09 RX ADMIN — FLUOXETINE HYDROCHLORIDE 40 MG: 20 CAPSULE ORAL at 08:21

## 2024-12-09 RX ADMIN — FINASTERIDE 5 MG: 5 TABLET, FILM COATED ORAL at 08:22

## 2024-12-09 RX ADMIN — TAMSULOSIN HYDROCHLORIDE 0.4 MG: 0.4 CAPSULE ORAL at 08:21

## 2024-12-09 RX ADMIN — PYRIDOSTIGMINE BROMIDE 60 MG: 60 TABLET ORAL at 14:10

## 2024-12-09 RX ADMIN — CEPHALEXIN 500 MG: 250 CAPSULE ORAL at 06:17

## 2024-12-09 RX ADMIN — SODIUM CHLORIDE, PRESERVATIVE FREE 10 ML: 5 INJECTION INTRAVENOUS at 08:20

## 2024-12-09 RX ADMIN — ATORVASTATIN CALCIUM 40 MG: 40 TABLET, FILM COATED ORAL at 08:22

## 2024-12-09 RX ADMIN — ENOXAPARIN SODIUM 30 MG: 100 INJECTION SUBCUTANEOUS at 19:41

## 2024-12-09 RX ADMIN — DOCUSATE SODIUM 100 MG: 100 CAPSULE, LIQUID FILLED ORAL at 14:10

## 2024-12-09 RX ADMIN — CEPHALEXIN 500 MG: 250 CAPSULE ORAL at 10:05

## 2024-12-09 RX ADMIN — PYRIDOSTIGMINE BROMIDE 60 MG: 60 TABLET ORAL at 10:05

## 2024-12-09 RX ADMIN — PYRIDOSTIGMINE BROMIDE 60 MG: 60 TABLET ORAL at 16:36

## 2024-12-09 RX ADMIN — MYCOPHENOLATE MOFETIL 1000 MG: 250 CAPSULE ORAL at 08:22

## 2024-12-09 RX ADMIN — PYRIDOSTIGMINE BROMIDE 60 MG: 60 TABLET ORAL at 19:41

## 2024-12-09 RX ADMIN — CEPHALEXIN 500 MG: 250 CAPSULE ORAL at 16:36

## 2024-12-09 RX ADMIN — ENOXAPARIN SODIUM 30 MG: 100 INJECTION SUBCUTANEOUS at 08:20

## 2024-12-09 RX ADMIN — MYCOPHENOLATE MOFETIL 1000 MG: 250 CAPSULE ORAL at 19:41

## 2024-12-09 RX ADMIN — FAMOTIDINE 20 MG: 20 TABLET, FILM COATED ORAL at 19:41

## 2024-12-09 RX ADMIN — HYDROCODONE BITARTRATE AND ACETAMINOPHEN 1 TABLET: 5; 325 TABLET ORAL at 08:20

## 2024-12-09 RX ADMIN — FAMOTIDINE 20 MG: 20 TABLET, FILM COATED ORAL at 08:21

## 2024-12-09 RX ADMIN — PYRIDOSTIGMINE BROMIDE 60 MG: 60 TABLET ORAL at 06:17

## 2024-12-09 RX ADMIN — ROPINIROLE HYDROCHLORIDE 2 MG: 1 TABLET, FILM COATED ORAL at 19:40

## 2024-12-09 ASSESSMENT — PAIN SCALES - GENERAL
PAINLEVEL_OUTOF10: 0
PAINLEVEL_OUTOF10: 1
PAINLEVEL_OUTOF10: 1
PAINLEVEL_OUTOF10: 2
PAINLEVEL_OUTOF10: 1

## 2024-12-09 ASSESSMENT — PAIN SCALES - WONG BAKER: WONGBAKER_NUMERICALRESPONSE: NO HURT

## 2024-12-09 ASSESSMENT — PAIN DESCRIPTION - ORIENTATION
ORIENTATION: RIGHT

## 2024-12-09 ASSESSMENT — PATIENT HEALTH QUESTIONNAIRE - PHQ9
SUM OF ALL RESPONSES TO PHQ QUESTIONS 1-9: 0
SUM OF ALL RESPONSES TO PHQ9 QUESTIONS 1 & 2: 0
SUM OF ALL RESPONSES TO PHQ QUESTIONS 1-9: 0
SUM OF ALL RESPONSES TO PHQ QUESTIONS 1-9: 0
1. LITTLE INTEREST OR PLEASURE IN DOING THINGS: NOT AT ALL
SUM OF ALL RESPONSES TO PHQ QUESTIONS 1-9: 0
2. FEELING DOWN, DEPRESSED OR HOPELESS: NOT AT ALL

## 2024-12-09 ASSESSMENT — PAIN DESCRIPTION - LOCATION
LOCATION: LEG
LOCATION: HIP;LEG
LOCATION: HIP;LEG

## 2024-12-09 ASSESSMENT — LIFESTYLE VARIABLES
HOW MANY STANDARD DRINKS CONTAINING ALCOHOL DO YOU HAVE ON A TYPICAL DAY: PATIENT DOES NOT DRINK
HOW OFTEN DO YOU HAVE A DRINK CONTAINING ALCOHOL: NEVER

## 2024-12-09 ASSESSMENT — PAIN - FUNCTIONAL ASSESSMENT
PAIN_FUNCTIONAL_ASSESSMENT: PREVENTS OR INTERFERES SOME ACTIVE ACTIVITIES AND ADLS
PAIN_FUNCTIONAL_ASSESSMENT: ACTIVITIES ARE NOT PREVENTED

## 2024-12-09 ASSESSMENT — PAIN DESCRIPTION - DESCRIPTORS
DESCRIPTORS: ACHING
DESCRIPTORS: ACHING

## 2024-12-09 NOTE — PLAN OF CARE
Problem: Chronic Conditions and Co-morbidities  Goal: Patient's chronic conditions and co-morbidity symptoms are monitored and maintained or improved  12/9/2024 0123 by Jonny Lee RN  Outcome: Progressing  Flowsheets (Taken 12/8/2024 2030)  Care Plan - Patient's Chronic Conditions and Co-Morbidity Symptoms are Monitored and Maintained or Improved:   Monitor and assess patient's chronic conditions and comorbid symptoms for stability, deterioration, or improvement   Collaborate with multidisciplinary team to address chronic and comorbid conditions and prevent exacerbation or deterioration   Update acute care plan with appropriate goals if chronic or comorbid symptoms are exacerbated and prevent overall improvement and discharge  12/8/2024 1443 by Behrns, Kailey, LPN  Outcome: Progressing  Flowsheets (Taken 12/7/2024 2000 by Wilton Mchugh, RN)  Care Plan - Patient's Chronic Conditions and Co-Morbidity Symptoms are Monitored and Maintained or Improved: Monitor and assess patient's chronic conditions and comorbid symptoms for stability, deterioration, or improvement     Problem: Discharge Planning  Goal: Discharge to home or other facility with appropriate resources  12/9/2024 0123 by Jonny Lee RN  Outcome: Progressing  Flowsheets (Taken 12/8/2024 2030)  Discharge to home or other facility with appropriate resources:   Identify barriers to discharge with patient and caregiver   Arrange for needed discharge resources and transportation as appropriate   Identify discharge learning needs (meds, wound care, etc)   Refer to discharge planning if patient needs post-hospital services based on physician order or complex needs related to functional status, cognitive ability or social support system  12/8/2024 1443 by Behrns, Kailey, LPN  Outcome: Progressing  Flowsheets (Taken 12/7/2024 2000 by Wilton Mchugh, RN)  Discharge to home or other facility with appropriate resources: Identify barriers to discharge

## 2024-12-09 NOTE — PLAN OF CARE
Repeat evaluation RLE:  -continued appropriate mobilization and efforts with therapy   -improvement in pain control   -inflammatory markers stable, right lateral wound healthy appearing  -no plans for surgical intervention at this time, suitable for discharge to [facility] tomorrow  -multimodal pain control  -ideally continues on lovenox however can defer this to PM&R    Will plan for repeat XR R hip in seven days, either in house or as inpatient pending patient location    Elva Molina PA-C

## 2024-12-09 NOTE — PLAN OF CARE
Problem: Chronic Conditions and Co-morbidities  Goal: Patient's chronic conditions and co-morbidity symptoms are monitored and maintained or improved  12/9/2024 1040 by Marie Weaver RN  Outcome: Progressing  Flowsheets (Taken 12/9/2024 0800)  Care Plan - Patient's Chronic Conditions and Co-Morbidity Symptoms are Monitored and Maintained or Improved: Monitor and assess patient's chronic conditions and comorbid symptoms for stability, deterioration, or improvement  12/9/2024 0123 by Jonny Lee RN  Outcome: Progressing  Flowsheets (Taken 12/8/2024 2030)  Care Plan - Patient's Chronic Conditions and Co-Morbidity Symptoms are Monitored and Maintained or Improved:   Monitor and assess patient's chronic conditions and comorbid symptoms for stability, deterioration, or improvement   Collaborate with multidisciplinary team to address chronic and comorbid conditions and prevent exacerbation or deterioration   Update acute care plan with appropriate goals if chronic or comorbid symptoms are exacerbated and prevent overall improvement and discharge     Problem: Discharge Planning  Goal: Discharge to home or other facility with appropriate resources  12/9/2024 1040 by Marie Weaver RN  Outcome: Progressing  Flowsheets (Taken 12/9/2024 0800)  Discharge to home or other facility with appropriate resources:   Identify barriers to discharge with patient and caregiver   Arrange for needed discharge resources and transportation as appropriate   Identify discharge learning needs (meds, wound care, etc)   Refer to discharge planning if patient needs post-hospital services based on physician order or complex needs related to functional status, cognitive ability or social support system  12/9/2024 0123 by Jonny Lee RN  Outcome: Progressing  Flowsheets (Taken 12/8/2024 2030)  Discharge to home or other facility with appropriate resources:   Identify barriers to discharge with patient and caregiver   Arrange for needed

## 2024-12-10 ENCOUNTER — HOSPITAL ENCOUNTER (INPATIENT)
Age: 74
LOS: 11 days | Discharge: HOME HEALTH | End: 2024-12-21
Attending: PHYSICAL MEDICINE & REHABILITATION | Admitting: PHYSICAL MEDICINE & REHABILITATION
Payer: MEDICARE

## 2024-12-10 VITALS
HEIGHT: 75 IN | SYSTOLIC BLOOD PRESSURE: 122 MMHG | WEIGHT: 250 LBS | RESPIRATION RATE: 18 BRPM | TEMPERATURE: 98.1 F | HEART RATE: 81 BPM | BODY MASS INDEX: 31.08 KG/M2 | OXYGEN SATURATION: 98 % | DIASTOLIC BLOOD PRESSURE: 67 MMHG

## 2024-12-10 DIAGNOSIS — S22.41XA CLOSED FRACTURE OF MULTIPLE RIBS OF RIGHT SIDE, INITIAL ENCOUNTER: ICD-10-CM

## 2024-12-10 DIAGNOSIS — Z96.649 PERIPROSTHETIC FRACTURE OF SHAFT OF FEMUR: Primary | ICD-10-CM

## 2024-12-10 DIAGNOSIS — M97.8XXA PERIPROSTHETIC FRACTURE OF SHAFT OF FEMUR: Primary | ICD-10-CM

## 2024-12-10 PROBLEM — E66.811 CLASS 1 OBESITY DUE TO EXCESS CALORIES WITHOUT SERIOUS COMORBIDITY WITH BODY MASS INDEX (BMI) OF 30.0 TO 30.9 IN ADULT: Status: ACTIVE | Noted: 2024-12-10

## 2024-12-10 PROBLEM — G70.00 MYASTHENIA GRAVIS (HCC): Status: ACTIVE | Noted: 2024-12-10

## 2024-12-10 PROBLEM — Z96.641 S/P TOTAL RIGHT HIP ARTHROPLASTY: Status: ACTIVE | Noted: 2024-12-10

## 2024-12-10 PROBLEM — G47.33 OBSTRUCTIVE SLEEP APNEA SYNDROME: Status: ACTIVE | Noted: 2024-12-10

## 2024-12-10 PROBLEM — S20.211A CHEST WALL CONTUSION, RIGHT, INITIAL ENCOUNTER: Status: ACTIVE | Noted: 2024-12-10

## 2024-12-10 PROBLEM — E66.09 CLASS 1 OBESITY DUE TO EXCESS CALORIES WITHOUT SERIOUS COMORBIDITY WITH BODY MASS INDEX (BMI) OF 30.0 TO 30.9 IN ADULT: Status: ACTIVE | Noted: 2024-12-10

## 2024-12-10 PROBLEM — Z87.898 H/O THYMOMA: Status: ACTIVE | Noted: 2024-12-10

## 2024-12-10 PROBLEM — I10 ESSENTIAL HYPERTENSION: Status: ACTIVE | Noted: 2024-12-10

## 2024-12-10 PROBLEM — N40.0 BENIGN PROSTATIC HYPERPLASIA: Status: ACTIVE | Noted: 2024-12-10

## 2024-12-10 LAB
GLUCOSE BLD STRIP.AUTO-MCNC: 146 MG/DL (ref 70–108)
GLUCOSE BLD STRIP.AUTO-MCNC: 235 MG/DL (ref 70–108)

## 2024-12-10 PROCEDURE — 2580000003 HC RX 258: Performed by: PHYSICAL MEDICINE & REHABILITATION

## 2024-12-10 PROCEDURE — 6360000002 HC RX W HCPCS: Performed by: PHYSICIAN ASSISTANT

## 2024-12-10 PROCEDURE — 99222 1ST HOSP IP/OBS MODERATE 55: CPT | Performed by: PHYSICAL MEDICINE & REHABILITATION

## 2024-12-10 PROCEDURE — 97535 SELF CARE MNGMENT TRAINING: CPT

## 2024-12-10 PROCEDURE — 82948 REAGENT STRIP/BLOOD GLUCOSE: CPT

## 2024-12-10 PROCEDURE — 6370000000 HC RX 637 (ALT 250 FOR IP): Performed by: PHYSICIAN ASSISTANT

## 2024-12-10 PROCEDURE — 6360000002 HC RX W HCPCS: Performed by: PHYSICAL MEDICINE & REHABILITATION

## 2024-12-10 PROCEDURE — 6370000000 HC RX 637 (ALT 250 FOR IP): Performed by: NURSE PRACTITIONER

## 2024-12-10 PROCEDURE — 6370000000 HC RX 637 (ALT 250 FOR IP): Performed by: PHYSICAL MEDICINE & REHABILITATION

## 2024-12-10 PROCEDURE — 2580000003 HC RX 258: Performed by: PHYSICIAN ASSISTANT

## 2024-12-10 PROCEDURE — 99238 HOSP IP/OBS DSCHRG MGMT 30/<: CPT | Performed by: NURSE PRACTITIONER

## 2024-12-10 PROCEDURE — 1180000000 HC REHAB R&B

## 2024-12-10 RX ORDER — MYCOPHENOLATE MOFETIL 250 MG/1
1000 CAPSULE ORAL 2 TIMES DAILY
Status: DISCONTINUED | OUTPATIENT
Start: 2024-12-10 | End: 2024-12-21 | Stop reason: HOSPADM

## 2024-12-10 RX ORDER — OXYCODONE HYDROCHLORIDE 5 MG/1
5 TABLET ORAL EVERY 4 HOURS PRN
Status: DISCONTINUED | OUTPATIENT
Start: 2024-12-10 | End: 2024-12-21 | Stop reason: HOSPADM

## 2024-12-10 RX ORDER — PSEUDOEPHEDRINE HCL 30 MG
100 TABLET ORAL 2 TIMES DAILY PRN
Qty: 60 CAPSULE | Refills: 0 | Status: ON HOLD | OUTPATIENT
Start: 2024-12-10 | End: 2024-12-10

## 2024-12-10 RX ORDER — SENNOSIDES A AND B 8.6 MG/1
1 TABLET, FILM COATED ORAL NIGHTLY
Status: DISCONTINUED | OUTPATIENT
Start: 2024-12-10 | End: 2024-12-21 | Stop reason: HOSPADM

## 2024-12-10 RX ORDER — DEXTROSE MONOHYDRATE 100 MG/ML
INJECTION, SOLUTION INTRAVENOUS CONTINUOUS PRN
Status: DISCONTINUED | OUTPATIENT
Start: 2024-12-10 | End: 2024-12-21 | Stop reason: HOSPADM

## 2024-12-10 RX ORDER — PYRIDOSTIGMINE BROMIDE 60 MG/1
60 TABLET ORAL
Status: DISCONTINUED | OUTPATIENT
Start: 2024-12-10 | End: 2024-12-11

## 2024-12-10 RX ORDER — FINASTERIDE 5 MG/1
5 TABLET, FILM COATED ORAL DAILY
Status: DISCONTINUED | OUTPATIENT
Start: 2024-12-11 | End: 2024-12-21 | Stop reason: HOSPADM

## 2024-12-10 RX ORDER — ERGOCALCIFEROL 1.25 MG/1
50000 CAPSULE, LIQUID FILLED ORAL WEEKLY
Status: DISCONTINUED | OUTPATIENT
Start: 2024-12-17 | End: 2024-12-21 | Stop reason: HOSPADM

## 2024-12-10 RX ORDER — FAMOTIDINE 20 MG/1
20 TABLET, FILM COATED ORAL 2 TIMES DAILY
Status: DISCONTINUED | OUTPATIENT
Start: 2024-12-10 | End: 2024-12-21 | Stop reason: HOSPADM

## 2024-12-10 RX ORDER — ROPINIROLE 1 MG/1
2 TABLET, FILM COATED ORAL NIGHTLY
Status: DISCONTINUED | OUTPATIENT
Start: 2024-12-10 | End: 2024-12-21 | Stop reason: HOSPADM

## 2024-12-10 RX ORDER — BISACODYL 10 MG
10 SUPPOSITORY, RECTAL RECTAL DAILY PRN
Status: DISCONTINUED | OUTPATIENT
Start: 2024-12-10 | End: 2024-12-21 | Stop reason: HOSPADM

## 2024-12-10 RX ORDER — ENOXAPARIN SODIUM 100 MG/ML
30 INJECTION SUBCUTANEOUS 2 TIMES DAILY
Status: DISCONTINUED | OUTPATIENT
Start: 2024-12-10 | End: 2024-12-21 | Stop reason: HOSPADM

## 2024-12-10 RX ORDER — POTASSIUM CHLORIDE 1500 MG/1
40 TABLET, EXTENDED RELEASE ORAL PRN
Status: DISCONTINUED | OUTPATIENT
Start: 2024-12-10 | End: 2024-12-21 | Stop reason: HOSPADM

## 2024-12-10 RX ORDER — CALCIUM CARBONATE 500(1250)
500 TABLET ORAL 2 TIMES DAILY WITH MEALS
Status: DISCONTINUED | OUTPATIENT
Start: 2024-12-11 | End: 2024-12-21 | Stop reason: HOSPADM

## 2024-12-10 RX ORDER — PYRIDOSTIGMINE BROMIDE 60 MG/1
60 TABLET ORAL EVERY 8 HOURS SCHEDULED
Status: DISCONTINUED | OUTPATIENT
Start: 2024-12-10 | End: 2024-12-10

## 2024-12-10 RX ORDER — POTASSIUM CHLORIDE 7.45 MG/ML
10 INJECTION INTRAVENOUS PRN
Status: DISCONTINUED | OUTPATIENT
Start: 2024-12-10 | End: 2024-12-21 | Stop reason: HOSPADM

## 2024-12-10 RX ORDER — MAGNESIUM SULFATE IN WATER 40 MG/ML
2000 INJECTION, SOLUTION INTRAVENOUS PRN
Status: DISCONTINUED | OUTPATIENT
Start: 2024-12-10 | End: 2024-12-21 | Stop reason: HOSPADM

## 2024-12-10 RX ORDER — ONDANSETRON 4 MG/1
4 TABLET, ORALLY DISINTEGRATING ORAL EVERY 8 HOURS PRN
Status: DISCONTINUED | OUTPATIENT
Start: 2024-12-10 | End: 2024-12-21 | Stop reason: HOSPADM

## 2024-12-10 RX ORDER — TAMSULOSIN HYDROCHLORIDE 0.4 MG/1
0.4 CAPSULE ORAL DAILY
Status: DISCONTINUED | OUTPATIENT
Start: 2024-12-11 | End: 2024-12-21 | Stop reason: HOSPADM

## 2024-12-10 RX ORDER — SODIUM CHLORIDE 0.9 % (FLUSH) 0.9 %
5-40 SYRINGE (ML) INJECTION EVERY 12 HOURS SCHEDULED
Status: DISCONTINUED | OUTPATIENT
Start: 2024-12-10 | End: 2024-12-11

## 2024-12-10 RX ORDER — DOCUSATE SODIUM 100 MG/1
100 CAPSULE, LIQUID FILLED ORAL 2 TIMES DAILY
Status: DISCONTINUED | OUTPATIENT
Start: 2024-12-10 | End: 2024-12-21 | Stop reason: HOSPADM

## 2024-12-10 RX ORDER — AMLODIPINE BESYLATE 5 MG/1
5 TABLET ORAL DAILY
Status: DISCONTINUED | OUTPATIENT
Start: 2024-12-11 | End: 2024-12-21 | Stop reason: HOSPADM

## 2024-12-10 RX ORDER — PYRIDOSTIGMINE BROMIDE 60 MG/1
60 TABLET ORAL ONCE
Status: COMPLETED | OUTPATIENT
Start: 2024-12-10 | End: 2024-12-10

## 2024-12-10 RX ORDER — CYCLOBENZAPRINE HCL 10 MG
5 TABLET ORAL 3 TIMES DAILY PRN
Status: DISCONTINUED | OUTPATIENT
Start: 2024-12-10 | End: 2024-12-17

## 2024-12-10 RX ORDER — POLYETHYLENE GLYCOL 3350 17 G/17G
17 POWDER, FOR SOLUTION ORAL DAILY PRN
Status: DISCONTINUED | OUTPATIENT
Start: 2024-12-10 | End: 2024-12-21 | Stop reason: HOSPADM

## 2024-12-10 RX ORDER — SODIUM CHLORIDE 9 MG/ML
INJECTION, SOLUTION INTRAVENOUS PRN
Status: DISCONTINUED | OUTPATIENT
Start: 2024-12-10 | End: 2024-12-21 | Stop reason: HOSPADM

## 2024-12-10 RX ORDER — ACETAMINOPHEN 325 MG/1
650 TABLET ORAL EVERY 6 HOURS
Status: DISCONTINUED | OUTPATIENT
Start: 2024-12-10 | End: 2024-12-21 | Stop reason: HOSPADM

## 2024-12-10 RX ORDER — POLYETHYLENE GLYCOL 3350 17 G/17G
17 POWDER, FOR SOLUTION ORAL DAILY PRN
Status: DISCONTINUED | OUTPATIENT
Start: 2024-12-10 | End: 2024-12-10 | Stop reason: SDUPTHER

## 2024-12-10 RX ORDER — OXYCODONE HYDROCHLORIDE 5 MG/1
2.5 TABLET ORAL EVERY 4 HOURS PRN
Status: DISCONTINUED | OUTPATIENT
Start: 2024-12-10 | End: 2024-12-21 | Stop reason: HOSPADM

## 2024-12-10 RX ORDER — GLUCAGON 1 MG/ML
1 KIT INJECTION PRN
Status: DISCONTINUED | OUTPATIENT
Start: 2024-12-10 | End: 2024-12-21 | Stop reason: HOSPADM

## 2024-12-10 RX ORDER — SODIUM CHLORIDE 0.9 % (FLUSH) 0.9 %
5-40 SYRINGE (ML) INJECTION PRN
Status: DISCONTINUED | OUTPATIENT
Start: 2024-12-10 | End: 2024-12-11

## 2024-12-10 RX ORDER — TRAZODONE HYDROCHLORIDE 50 MG/1
50 TABLET, FILM COATED ORAL NIGHTLY PRN
Status: DISCONTINUED | OUTPATIENT
Start: 2024-12-10 | End: 2024-12-21 | Stop reason: HOSPADM

## 2024-12-10 RX ORDER — ACETAMINOPHEN 325 MG/1
650 TABLET ORAL EVERY 4 HOURS PRN
Status: DISCONTINUED | OUTPATIENT
Start: 2024-12-10 | End: 2024-12-21 | Stop reason: HOSPADM

## 2024-12-10 RX ORDER — ATORVASTATIN CALCIUM 40 MG/1
40 TABLET, FILM COATED ORAL DAILY
Status: DISCONTINUED | OUTPATIENT
Start: 2024-12-11 | End: 2024-12-21 | Stop reason: HOSPADM

## 2024-12-10 RX ADMIN — SODIUM CHLORIDE, PRESERVATIVE FREE 10 ML: 5 INJECTION INTRAVENOUS at 20:59

## 2024-12-10 RX ADMIN — SODIUM CHLORIDE, PRESERVATIVE FREE 10 ML: 5 INJECTION INTRAVENOUS at 20:58

## 2024-12-10 RX ADMIN — AMLODIPINE BESYLATE 5 MG: 5 TABLET ORAL at 09:41

## 2024-12-10 RX ADMIN — ENOXAPARIN SODIUM 30 MG: 100 INJECTION SUBCUTANEOUS at 09:40

## 2024-12-10 RX ADMIN — ACETAMINOPHEN 650 MG: 325 TABLET ORAL at 15:35

## 2024-12-10 RX ADMIN — PYRIDOSTIGMINE BROMIDE 60 MG: 60 TABLET ORAL at 18:24

## 2024-12-10 RX ADMIN — CEPHALEXIN 500 MG: 250 CAPSULE ORAL at 00:06

## 2024-12-10 RX ADMIN — FLUOXETINE HYDROCHLORIDE 40 MG: 20 CAPSULE ORAL at 09:42

## 2024-12-10 RX ADMIN — FAMOTIDINE 20 MG: 20 TABLET, FILM COATED ORAL at 09:42

## 2024-12-10 RX ADMIN — ROPINIROLE HYDROCHLORIDE 2 MG: 1 TABLET, FILM COATED ORAL at 20:55

## 2024-12-10 RX ADMIN — CEPHALEXIN 500 MG: 250 CAPSULE ORAL at 05:39

## 2024-12-10 RX ADMIN — PYRIDOSTIGMINE BROMIDE 60 MG: 60 TABLET ORAL at 11:58

## 2024-12-10 RX ADMIN — SODIUM CHLORIDE, PRESERVATIVE FREE 10 ML: 5 INJECTION INTRAVENOUS at 09:40

## 2024-12-10 RX ADMIN — PYRIDOSTIGMINE BROMIDE 60 MG: 60 TABLET ORAL at 20:55

## 2024-12-10 RX ADMIN — Medication 6 MG: at 20:53

## 2024-12-10 RX ADMIN — METFORMIN HYDROCHLORIDE 500 MG: 500 TABLET ORAL at 17:07

## 2024-12-10 RX ADMIN — FAMOTIDINE 20 MG: 20 TABLET, FILM COATED ORAL at 20:54

## 2024-12-10 RX ADMIN — CEPHALEXIN 500 MG: 250 CAPSULE ORAL at 11:58

## 2024-12-10 RX ADMIN — PYRIDOSTIGMINE BROMIDE 60 MG: 60 TABLET ORAL at 09:42

## 2024-12-10 RX ADMIN — DOCUSATE SODIUM 100 MG: 100 CAPSULE, LIQUID FILLED ORAL at 09:41

## 2024-12-10 RX ADMIN — MYCOPHENOLATE MOFETIL 1000 MG: 250 CAPSULE ORAL at 20:54

## 2024-12-10 RX ADMIN — DICLOFENAC SODIUM 2 G: 10 GEL TOPICAL at 18:45

## 2024-12-10 RX ADMIN — TRAZODONE HYDROCHLORIDE 50 MG: 50 TABLET ORAL at 21:23

## 2024-12-10 RX ADMIN — ATORVASTATIN CALCIUM 40 MG: 40 TABLET, FILM COATED ORAL at 09:40

## 2024-12-10 RX ADMIN — ENOXAPARIN SODIUM 30 MG: 100 INJECTION SUBCUTANEOUS at 20:57

## 2024-12-10 RX ADMIN — PYRIDOSTIGMINE BROMIDE 60 MG: 60 TABLET ORAL at 22:45

## 2024-12-10 RX ADMIN — DOCUSATE SODIUM 100 MG: 100 CAPSULE, LIQUID FILLED ORAL at 20:54

## 2024-12-10 RX ADMIN — HYDROCODONE BITARTRATE AND ACETAMINOPHEN 1 TABLET: 5; 325 TABLET ORAL at 09:41

## 2024-12-10 RX ADMIN — TAMSULOSIN HYDROCHLORIDE 0.4 MG: 0.4 CAPSULE ORAL at 09:43

## 2024-12-10 RX ADMIN — OXYCODONE HYDROCHLORIDE 5 MG: 5 TABLET ORAL at 21:13

## 2024-12-10 RX ADMIN — FINASTERIDE 5 MG: 5 TABLET, FILM COATED ORAL at 09:42

## 2024-12-10 RX ADMIN — PYRIDOSTIGMINE BROMIDE 60 MG: 60 TABLET ORAL at 15:36

## 2024-12-10 RX ADMIN — MYCOPHENOLATE MOFETIL 1000 MG: 250 CAPSULE ORAL at 09:43

## 2024-12-10 RX ADMIN — PYRIDOSTIGMINE BROMIDE 60 MG: 60 TABLET ORAL at 05:39

## 2024-12-10 ASSESSMENT — PAIN DESCRIPTION - DESCRIPTORS
DESCRIPTORS: ACHING
DESCRIPTORS: SHARP;DULL
DESCRIPTORS: ACHING

## 2024-12-10 ASSESSMENT — PAIN SCALES - WONG BAKER
WONGBAKER_NUMERICALRESPONSE: NO HURT

## 2024-12-10 ASSESSMENT — PAIN DESCRIPTION - ORIENTATION
ORIENTATION: RIGHT

## 2024-12-10 ASSESSMENT — PAIN SCALES - GENERAL
PAINLEVEL_OUTOF10: 3
PAINLEVEL_OUTOF10: 0
PAINLEVEL_OUTOF10: 7
PAINLEVEL_OUTOF10: 1
PAINLEVEL_OUTOF10: 3

## 2024-12-10 ASSESSMENT — PAIN DESCRIPTION - LOCATION
LOCATION: HIP

## 2024-12-10 ASSESSMENT — PAIN - FUNCTIONAL ASSESSMENT: PAIN_FUNCTIONAL_ASSESSMENT: PREVENTS OR INTERFERES WITH ALL ACTIVE AND SOME PASSIVE ACTIVITIES

## 2024-12-10 NOTE — PLAN OF CARE
Repeat XR was ordered by PM&R yesterday, unclear if acute event, no apparent  significant change in symptoms, pain remains well controlled    -no changes as anticipated upon review of imaging   -no intervention from ortho standpoint  -will repeat image 12/16/24 for routine post fracture care  -TTWB RLE with walker    Ortho available as needed while in house    Elva Molina PA-C

## 2024-12-10 NOTE — PROGRESS NOTES
Admitted to the Inpatient Rehabilitation Unit via wheelchair.  Patient was then oriented to room and unit.  Education provided on the rehabilitation routine: three hours of therapy five days per week.      Explained patients right to have family, representative or physician notified of their admission.  Patient has Requested for physician to be notified.  Patient has Requested for family/representative to be notified.    Admitting medication orders compared with acute stay medications; home medication list reviewed with patient/family.  Medication issues identified No      Vaccination Status  Have you ever received a COVID-19 vaccine? No      Transportation:   Has transportation kept you from medical appointments, meetings, work, or from getting things needed for daily living? (Check all that apply)  Yes, it has kept me from medical appointments or from getting my medications.      Health Literacy:   How often do you need to have someone help you when you read instructions, pamphlets, or other written material from your doctor or pharmacy?  0. - Never    Social Isolation:  How often do you feel lonely or isolated from those around you?  0. Never    Patient Mood Interview (PHQ-2 to 9) (from [employer]©)   Say to Patient: \"Over the last 2 weeks, have you been bothered by any of the following problems?\"   If symptom is present, enter yes in column 1 (Symptom Presence)  If yes in column 1, then ask the patient: “About how often have you been bothered by this?” Indicate response in column 2, Symptom Frequency.   Symptom Presence  No    Yes   9.    No response  Symptom Frequency  Never or 1 day  2-6 days (several days)  7-11 days (half or more of the days)  12-14 days (nearly every day)    Symptom Presence Symptom Frequency   Little interest or pleasure in doing things 1. Yes 0. Never or 1 day   Feeling down, depressed, or hopeless 0. No 0. Never or 1 day   If either A or B above has symptom frequency coded 2 or 3,  CONTINUE asking questions below.      If not, END the interview  and right click on next table to delete.         Trouble falling or staying asleep, or sleeping too much 1. Yes 3. 12-14 days (nearly every day)   Feeling tired or having little energy 1. Yes 3. 12-14 days (nearly every day)   Poor appetite or overeating 1. Yes 0. Never or 1 day   Feeling bad about yourself-or that you are a failure or have let yourself or your family down 0. No 0. Never or 1 day   Trouble concentrating on things, such as reading the newspaper or watching television 0. No 0. Never or 1 day    Moving or speaking so slowly that other people could have noticed.   Or the opposite-being so fidgety or restless that you have been moving around a lot more than usual 0. No 0. Never or 1 day   Thoughts that you would be better off dead, or of hurting yourself in some way 0. No 0. Never or 1 day   Provider notified of positive mood interview no          Pain:  Pain Effect on Sleep    Ask patient: \"Over the past 5 days, how much of the time has pain made it hard for you to sleep at night?\" 4.  Almost constantly     If no pain is reported, Stop here. If pain is reported, continue with the additional questions.   Pain Interference with Therapy Activities   Ask patient: \"Over the past 5 days, how often have you limited your participation in rehabilitation therapy sessions due to pain?\" 1.  Rarely or not at all   Pain Interference with Day to Day Activities   Ask patient: \"Over the past 5 days, how often have you limited your day to day activities (excluding rehabilitation therapy sessions) because of pain?\" 1.  Rarely or not at all         Bladder and Bowel Function Assessment:  Prior history of bladder problems: urgency  Number of pads used per day:  0  Frequency of night time voidin times  Fluid intake volume and pattern: 1000  Last BM: 12/10/24  Bowel problems (prior or current) No      Incontinence      Frequent diarrhea      No BM in 3 days

## 2024-12-10 NOTE — DISCHARGE INSTR - COC
NO:11381}       Date of Last BM: ***    Intake/Output Summary (Last 24 hours) at 12/10/2024 1242  Last data filed at 12/10/2024 0428  Gross per 24 hour   Intake 760 ml   Output 1100 ml   Net -340 ml     I/O last 3 completed shifts:  In: 1150 [P.O.:1150]  Out: 1550 [Urine:1550]    Safety Concerns:     { COLBY Safety Concerns:186635236}    Impairments/Disabilities:      { COLBY Impairments/Disabilities:973312308}    Nutrition Therapy:  Current Nutrition Therapy:   { COLBY Diet List:088841902}    Routes of Feeding: {[facility] DME Other Feedings:363316496}  Liquids: {Slp liquid thickness:30134}  Daily Fluid Restriction: {[facility] D[facility] Yes amt example:655926764}  Last Modified Barium Swallow with Video (Video Swallowing Test): {Done Not Done Date:}    Treatments at the Time of Hospital Discharge:   Respiratory Treatments: ***  Oxygen Therapy:  {Therapy; copd oxygen:92593}  Ventilator:    {[facility] Vent List:510695006}    Rehab Therapies: {THERAPEUTIC INTERVENTION:9314992086}  Weight Bearing Status/Restrictions: {[facility] Weight Bearin}  Other Medical Equipment (for information only, NOT a DME order):  {EQUIPMENT:289539093}  Other Treatments: ***    Patient's personal belongings (please select all that are sent with patient):  {[facility] DME Belongings:767037057}    RN SIGNATURE:  {Esignature:789965917}    CASE MANAGEMENT/SOCIAL WORK SECTION    Inpatient Status Date: ***    Readmission Risk Assessment Score:  Readmission Risk              Risk of Unplanned Readmission:  14           Discharging to Facility/ Agency   Name:   Address:  Phone:  Fax:    Dialysis Facility (if applicable)   Name:  Address:  Dialysis Schedule:  Phone:  Fax:    / signature: {Esignature:004716874}    PHYSICIAN SECTION    Prognosis: Good    Condition at Discharge: Stable    Rehab Potential (if transferring to Rehab): Good    Recommended Labs or Other Treatments After Discharge: PT/OT; discuss anticoagulation with ortho. Monitor

## 2024-12-10 NOTE — PLAN OF CARE
Problem: Discharge Planning  Goal: Discharge to home or other facility with appropriate resources  Outcome: Progressing  Flowsheets (Taken 12/10/2024 1451)  Discharge to home or other facility with appropriate resources:   Identify barriers to discharge with patient and caregiver   Arrange for needed discharge resources and transportation as appropriate   Identify discharge learning needs (meds, wound care, etc)   Arrange for interpreters to assist at discharge as needed   Refer to discharge planning if patient needs post-hospital services based on physician order or complex needs related to functional status, cognitive ability or social support system     Problem: Chronic Conditions and Co-morbidities  Goal: Patient's chronic conditions and co-morbidity symptoms are monitored and maintained or improved  Outcome: Progressing  Flowsheets (Taken 12/10/2024 1451)  Care Plan - Patient's Chronic Conditions and Co-Morbidity Symptoms are Monitored and Maintained or Improved:   Monitor and assess patient's chronic conditions and comorbid symptoms for stability, deterioration, or improvement   Collaborate with multidisciplinary team to address chronic and comorbid conditions and prevent exacerbation or deterioration   Update acute care plan with appropriate goals if chronic or comorbid symptoms are exacerbated and prevent overall improvement and discharge     Problem: ABCDS Injury Assessment  Goal: Absence of physical injury  Outcome: Progressing  Flowsheets (Taken 12/10/2024 1451)  Absence of Physical Injury: Implement safety measures based on patient assessment     Problem: Safety - Adult  Goal: Free from fall injury  Outcome: Progressing  Flowsheets (Taken 12/10/2024 1451)  Free From Fall Injury:   Instruct family/caregiver on patient safety   Based on caregiver fall risk screen, instruct family/caregiver to ask for assistance with transferring infant if caregiver noted to have fall risk factors     Problem: Pain  Goal:

## 2024-12-10 NOTE — PROGRESS NOTES
Spiritual Health History and Assessment/Progress Note  [facility]    (P) Follow-up,  ,  ,      Name: Duane Scheiderer MRN: [id number]    Age: 74 y.o.     Sex: male   Language: English   Spiritism: [place]   Periprosthetic fracture of shaft of femur     Date: 12/10/2024            Total Time Calculated: (P) 8 min              Spiritual Assessment continued in [facility]        Referral/Consult From: (P) Nurse   Encounter Overview/Reason: (P) Follow-up  Service Provided For: (P) Patient    Sakina, Belief, Meaning:   Patient identifies as spiritual, is connected with a sakina tradition or spiritual practice, and has beliefs or practices that help with coping during difficult times  Family/Friends No family/friends present      Importance and Influence:  Patient has spiritual/personal beliefs that influence decisions regarding their health  Family/Friends No family/friends present    Community:  Patient is connected with a spiritual community and feels well-supported. Support system includes: Spouse/Partner, Children, Sakina Community, and Friends  Family/Friends No family/friends present    Assessment and Plan of Care:     During my encounter with Duane, a 74 year old male admitted on 8K, he is alone, has strong sakina in God. Patient is  and shared with me he has gone under several surgeries all through his life but believed that God has been there with him through it all. Patient is hopeful, coping, engaged in conversation and asked for prayer.  During this encounter, I offered words of encouragement, provided active listening presence, and nurtured hope. I offered prayer for healing and strength for patient and his family.  Spiritual health staff will continue to provide emotional and spiritual support to patient as needed.    Patient Interventions include: Facilitated expression of thoughts and feelings  Family/Friends Interventions include: No

## 2024-12-10 NOTE — PLAN OF CARE
Problem: Chronic Conditions and Co-morbidities  Goal: Patient's chronic conditions and co-morbidity symptoms are monitored and maintained or improved  12/10/2024 1048 by Marie Weaver RN  Outcome: Completed  Flowsheets (Taken 12/10/2024 0930)  Care Plan - Patient's Chronic Conditions and Co-Morbidity Symptoms are Monitored and Maintained or Improved:   Monitor and assess patient's chronic conditions and comorbid symptoms for stability, deterioration, or improvement   Collaborate with multidisciplinary team to address chronic and comorbid conditions and prevent exacerbation or deterioration  12/9/2024 2056 by Dior Majano, RN  Outcome: Progressing  Flowsheets (Taken 12/9/2024 2056)  Care Plan - Patient's Chronic Conditions and Co-Morbidity Symptoms are Monitored and Maintained or Improved:   Monitor and assess patient's chronic conditions and comorbid symptoms for stability, deterioration, or improvement   Update acute care plan with appropriate goals if chronic or comorbid symptoms are exacerbated and prevent overall improvement and discharge   Collaborate with multidisciplinary team to address chronic and comorbid conditions and prevent exacerbation or deterioration     Problem: Discharge Planning  Goal: Discharge to home or other facility with appropriate resources  12/10/2024 1048 by Marie Weaver RN  Outcome: Completed  Flowsheets (Taken 12/10/2024 0930)  Discharge to home or other facility with appropriate resources:   Identify barriers to discharge with patient and caregiver   Arrange for needed discharge resources and transportation as appropriate   Identify discharge learning needs (meds, wound care, etc)   Refer to discharge planning if patient needs post-hospital services based on physician order or complex needs related to functional status, cognitive ability or social support system  12/9/2024 2056 by Dior Majano, RN  Outcome: Progressing  Flowsheets (Taken 12/9/2024 2056)  Discharge to home or

## 2024-12-10 NOTE — PLAN OF CARE
Problem: Chronic Conditions and Co-morbidities  Goal: Patient's chronic conditions and co-morbidity symptoms are monitored and maintained or improved  12/9/2024 2056 by Dior Majano, RN  Outcome: Progressing  Flowsheets (Taken 12/9/2024 2056)  Care Plan - Patient's Chronic Conditions and Co-Morbidity Symptoms are Monitored and Maintained or Improved:   Monitor and assess patient's chronic conditions and comorbid symptoms for stability, deterioration, or improvement   Update acute care plan with appropriate goals if chronic or comorbid symptoms are exacerbated and prevent overall improvement and discharge   Collaborate with multidisciplinary team to address chronic and comorbid conditions and prevent exacerbation or deterioration     Problem: Discharge Planning  Goal: Discharge to home or other facility with appropriate resources  12/9/2024 2056 by Dior Majano, RN  Outcome: Progressing  Flowsheets (Taken 12/9/2024 2056)  Discharge to home or other facility with appropriate resources:   Identify barriers to discharge with patient and caregiver   Arrange for needed discharge resources and transportation as appropriate   Identify discharge learning needs (meds, wound care, etc)     Problem: Pain  Goal: Verbalizes/displays adequate comfort level or baseline comfort level  12/9/2024 2056 by Dior Majano, RN  Outcome: Progressing  Flowsheets (Taken 12/9/2024 2056)  Verbalizes/displays adequate comfort level or baseline comfort level:   Encourage patient to monitor pain and request assistance   Assess pain using appropriate pain scale   Administer analgesics based on type and severity of pain and evaluate response   Implement non-pharmacological measures as appropriate and evaluate response     Problem: Safety - Adult  Goal: Free from fall injury  12/9/2024 2056 by Dior Majano, RN  Outcome: Progressing  Flowsheets (Taken 12/9/2024 2056)  Free From Fall Injury: Instruct family/caregiver on patient safety

## 2024-12-10 NOTE — PROGRESS NOTES
Physical Medicine & Rehabilitation Progress Note    Chief Complaint:  Right femur periprosthetic fracture second to fall     Subjective:    Duane Scheiderer is a 74 y.o.  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     The patient says he developed gradual onset right hip and groin pain starting in late 2022 to early 2023.  The painful symptom progressively worsened and occasionally radiating down to his right lower extremity foot area.  He says the pain was particularly worse when he weightbearing and walk on his right lower extremity.  An x-ray of right hip done at [facility] on 12/7/2023 was reported to show mild osteoarthritic change of right hip.  Because of worsening symptoms he had right hip MRI done at the [facility] on 8/29/2024 and revealed severe right hip osteoarthritis with full-thickness chondral defect, moderate joint effusion, and degenerative signal and fraying of the acetabular labrum with paralabral cyst.  Therefore right hip replacement surgery was recommended.  The patient then was admitted to [facility] on 11/12/2024 and underwent right total hip  assistance.  Completed bathing in walk-in shower while seated on shower chair. Assist provided to wash/dry BLE and back..  CARE Score: 3.     UPPER BODY DRESSING:Supervision or touching assistance.  Pt doffed gown in stance. Donned t shirt with set up while seated..  CARE Score: 4.     LOWER BODY DRESSING:Supervision or touching assistance.  Utilized reacher. Threaded/doffed pants and brief in seated. CGA in stance to pull up over hips..  CARE Score: 4.     FOOTWEAR:Partial/moderate assistance  Utilized sock aid and completed in sitting. Assist to pull socks over heels..  CARE Score: 3.     TOILET TRANSFER: Supervision or touching assistance.  CGA for steadying assist.. CARE Score: 4.    GROOMING: CGA in stance at sink to brush teeth. Seated EOB w/ set up to apply deodorant.   SHOWER TRANSFER: CGA for steadying, used RW and grab bars.      BALANCE:  Sitting Balance:  Independent.    Standing Balance: Stand By Assistance, Contact Guard Assistance, with verbal cues , with increased time for completion. Via RW.     BED MOBILITY:  Supine to Sit: Modified Independent, with head of bed raised, with rail , towards the R.     TRANSFERS:  Sit to Stand:  Contact Guard Assistance, with increased time for completion. Via RW,  Stand to Sit: Contact Guard Assistance, poor eccentric control. Via RW.     FUNCTIONAL MOBILITY:  Assistive Device: Rolling Walker  Assist Level:  Stand By Assistance, Contact Guard Assistance, and with increased time for completion.   Distance: Pt participated in functional mobility at household distance via RW,  1 LOB(s) requiring contact guard assist to correct, with intermittent seated rest break(s) to facilitate improved activity tolerance and balance for increased ADL participation.        ST: N/A      Review of Systems:  Review of Systems   Constitutional:  Negative for chills, diaphoresis, fatigue and fever.   HENT:  Negative for hearing loss, rhinorrhea, sneezing, sore throat and trouble

## 2024-12-10 NOTE — CARE COORDINATION
Patient educated on how to use incentive spirometer. Patient verbalized understanding and demonstrated proper use. Emphasized importance and usage of device, with coughing and deep breathing every 4 hours while awake.      Patient new to room 8k Bed 5. Patient is alert and oriented xs 4. POC has been discussed. Patient denies pain or discomfort. Patient is eager to work with therapy and get home to his wife Hannah.     Call light is in reach, Bed side table next to the patient. Chair an bed alarm are on.

## 2024-12-10 NOTE — PROGRESS NOTES
Hospitalist Progress Note      Patient:  Duane Scheiderer 74 y.o. male     Unit/Bed:-13/013-A    Date of Admission: 12/6/2024      ASSESSMENT AND PLAN    Active Problems    Intractable right hip pain , with Ortho on board.           CT reveals nondisplaced fracture, Ortho was okay with therapy  Had a syncopal episode while he was having bowel movement and straining likely vasovagal but does have risk factors including diabetes  Insulin-dependent diabetes on pump -last A1c in the system 7.8,        POC glucose 139, 203, 251  History of myasthenia gravis   BPH on Proscar and Flomax   Normocytic anemia chronic issue  History of hypertension on Norvasc 5 mg  Dyslipidemia on Lipitor 40 mg daily which we will continue      Ortho recommended continue with physical therapy and Occupational Therapy is working with them and also PM & R consult was requested today I encouraged Duane to work with therapy is much as possible.    He is tolerating PO intake.     Chronic Conditions (reviewed and stable unless otherwise stated)  As outlined above      LDA: []CVC / []PICC / []Midline / []Callahan / []Drains / []Mediport / [x]None  Antibiotics:   Steroids:   Labs (still needed?): [x]Yes / []No  IVF (still needed?): []Yes / [x]No    Level of care: []Step Down / [x]Med-Surg  Bed Status: [x]Inpatient / []Observation  Telemetry: []Yes / [x]No  PT/OT: [x]Yes / []No    DVT Prophylaxis: [x] Lovenox / [] Heparin / [] SCDs / [] Already on Systemic Anticoagulation / [] None   Code status: Full Code     Expected discharge date: TBD  Disposition: Possible  rehab versus SNF, based on clinical course    ===================================================================    Chief Complaint: Right hip pain and syncope    Subjective (past 24 hours):      Patient was sitting in recliner on my evaluation this morning denies any chest pain was able to ambulate with therapy, but he did have total hip arthroplasty back in November by Dr. Croft.  
    Hospitalist Progress Note      Patient:  Duane Scheiderer 74 y.o. male     Unit/Bed:[facility]/013-A    Date of Admission: 12/6/2024      ASSESSMENT AND PLAN    Active Problems    Intractable right hip pain , with Ortho on board.           CT reveals nondisplaced fracture, Ortho was okay with therapy  Finishing a dose of Keflex po. no plans for surgical intervention at this time, suitable for discharge to [facility] tomorrow   Had a syncopal episode while he was having bowel movement and straining likely vasovagal but does have risk factors including diabetes; colace bid added   Insulin-dependent diabetes on pump -last A1c in the system 7.8,       History of myasthenia gravis   BPH on Proscar and Flomax-denies retention  Normocytic anemia chronic issue  History of hypertension on Norvasc 5 mg  Dyslipidemia on Lipitor 40 mg daily which we will continue      Ortho recommended continue with physical therapy and Occupational Therapy is working with them and also PM & R consulted. Repeat Xray of right femur pending     Chronic Conditions (reviewed and stable unless otherwise stated)  As outlined above      LDA: []CVC / []PICC / []Midline / []Callahan / []Drains / []Mediport / [x]None  Antibiotics:   Steroids:   Labs (still needed?): [x]Yes / []No  IVF (still needed?): []Yes / [x]No    Level of care: []Step Down / [x]Med-Surg  Bed Status: [x]Inpatient / []Observation  Telemetry: []Yes / [x]No  PT/OT: [x]Yes / []No    DVT Prophylaxis: [x] Lovenox / [] Heparin / [] SCDs / [] Already on Systemic Anticoagulation / [] None   Code status: Full Code     Expected discharge date: tomorrow   Disposition: Possible  rehab versus SNF, based on clinical course    ===================================================================    Chief Complaint: Right hip pain and syncope    Subjective (past 24 hours):      Resting in bed. States his pain has been better controlled. No current draining from the right hip. No sob, fever or chills   Does ok 
    Pharmacist Review and Automatic Dose Adjustment of Prophylactic Enoxaparin    The reviewing pharmacist has made an adjustment to the ordered enoxaparin dose or converted to UFH per the approved [facility] protocol and table as identified below.      Duane Scheiderer is a 74 y.o. male.     Recent Labs     12/06/24  1030   CREATININE 0.7       Estimated Creatinine Clearance: 126 mL/min (based on SCr of 0.7 mg/dL).    Recent Labs     12/06/24  1030   HGB 11.3*   HCT 36.0*        No results for input(s): \"INR\" in the last 72 hours.    Height:   Ht Readings from Last 1 Encounters:   11/12/24 1.905 m (6' 3\")     Weight:  Wt Readings from Last 1 Encounters:   12/06/24 113.4 kg (250 lb)               Plan: Based upon the patient's weight and renal function    Ordered: Enoxaparin 40mg SUBQ Daily    Changed/converted to    New Order: Enoxaparin 30mg SUBQ BID      Thank you,  Celia Swenson [facility]  12/6/2024, 3:17 PM    
Blood sugar via dexcom showed 151 this am. Patient states he does not give himself a bolus until just before breakfast.  
Discussed patient with Dr. Phan who request confirmation that Dr. Croft is aware of the patient planned discharge/readmit [facility].  Messaged EDUARDO Cardona through [employer] who indicates that she discussed this patient with Dr. Croft and plan is to have another xray on Monday.  Dr. Phan and PM&R team updated on perfect serve message.   
Non-hospital medication form signed and placed in chart (Insulin Aspart placed in refrigerator per MYRON Serrato RN). CGM/Pump waiver signed and placed in chart.   
Patient BG 97 according to dexcom, still has 21 units of insulin in pump, does not want refilled at this time.  
Patient discharged to Inpatient Rehab 8K[facility]. Patient left with all belongings. Patient sent with CHG soap for infection prevention. All questions answered at this time. Patient wife is aware of transfer.   
Patients  according to dexcom    
SBIRT screening completed per trauma protocol. SBIRT Findings are Negative.  Patient denies alcohol and/or drug use. Also denies depressive symptoms.    Brief Intervention and Referral to Treatment Summary N/A    Patient was provided PHQ-9, AUDIT-C and DAST Screening:      PHQ-9 Score:  Negative  AUDIT-C Score:  Negative  DAST Score:   Negative    Patient’s substance use is considered-Negative    Low Risk/Healthy  Moderate Risk  Harmful  Dependent    Patient’s depression is considered:-Negative    Minimal  Mild   Moderate  Moderately Severe  Severe    Brief Education Was Provided N/A    Patient was receptive  Patient was not receptive      Brief Intervention Is Provided (Only for AUDIT-C or DAST) N/A    Patient reports readiness to decrease and/or stop use and a plan was discussed   Patient denies readiness to decrease and/or stop use and a plan was not discussed    Injured Trauma Survivor Screening  1.  When you were injured or right afterward   Did you think you were going to die? RESPONSES; YES+1/NO: NO  Do you think this was done to you intentionally? NO    Since your injury  Have you felt more restless, tense or jumpy than usual? RESPONSES; YES+1/NO: NO  Have you found yourself unable to stop worrying? RESPONSES; YES+1/NO: NO  Do you find yourself thinking that the world is unsafe and that people are not to be trusted? RESPONSES; YES+1/NO: NO    TOTAL SCORE from ITSS Questions 1 and 2:     NOTE: A score of greater than or equal to 2 is considered positive for PTSD risk and is to receive a community resource packet to link with appropriate providers.    Recommendations/Referrals for Brief and/or Specialized Treatment Provided to Patient  N/A    
Spoke with Primary RN Marie and CORBY Carty.  Patient to discharge/readmit to [address] [place] [address] 8k5 today; please call report to [phone].    
[facility]  INPATIENT REHABILITATION  ADMISSIONS COORDINATOR CONSULT    Referral Type: internal    Patient Name: Duane Scheiderer      MRN: [id number]    : 1950  (74 y.o.)  Gender: male   Race:White (non-)     Payor Source: Payor: MEDICARE / Plan: MEDICARE PART A AND B / Product Type: *No Product type* /   Secondary Payor Source:  GPM    Isolation Status: No active isolations    Lives With: Spouse  Type of Home: House  Home Layout: One level  Home Access: Stairs to enter with rails  Entrance Stairs - Number of Steps: 4 RUPESH with 1 handrail. Pt has a lift gait over the steps if needed.        Additional Comments: Pt reports he was IND with use of a cane. Pt had THR on the right on . Pt went home with  PT, OT, and RN and was still current with those services at time of admit.    What is treatment plan?  Disciplines Required upon Admission to Inpatient Rehabilitation: Physical Therapy and Occupational Therapy  Post operative: Yes  Fall: Yes  Dialysis: No  Diet: ADULT DIET; Regular; 4 carb choices (60 gm/meal)  Discussed patient with  and PM&R provider: Patient is appropriate for IPR.  Spoke with patient and his wife who are in agreement with discharge/readmit to IPR.    Update:  1025 Spoke with Dr. Phan regarding the above conversation with patient. Dr. Phan ordered repeat xray right femur.  CORBY Liao updated via telephone voicemail message.    Update:  1300 Saw Carolyn, Public Benefits department asked her to see patient for his questions related to Medicare benefits.      Update 1406 spoke with Dr. Phan, regarding ortho PA note stating patient to discharge to IPR tomorrow.  Called CORBY Liao discussed with her.  Planning to admit to IPR when orthopedics clears patient for discharge.       
Shower/Tub: Walk-in shower  Bathroom Toilet: Handicap height  Bathroom Equipment: None  Bathroom Accessibility: Walker accessible    Prior Level of Assist for ADLs: Needs assistance (Pt IND except for getting sicks on. Wife assists)  Prior Level of Assist for Homemaking: Independent  Homemaking Responsibilities: Yes  Prior Level of Assist for Transfers: Independent  Active : No  Additional Comments: Pt reports he was IND with use of a cane. Pt had THR on the right on Nov 12. Pt went home with  PT, OT, and RN and was still current with those services at time of admit.  Prior Level of Assist for Ambulation: Independent household ambulator, with or without device, Independent community ambulator, with or without device  Has the patient had two or more falls in the past year or any fall with injury in the past year?: Yes (only recent fall leading to admission.)    Restrictions/Precautions:  Restrictions/Precautions: Weight Bearing, ROM Restrictions  Right Lower Extremity Weight Bearing: Toe Touch Weight Bearing  Position Activity Restriction  Hip Precautions: No hip flexion > 90 degrees, No ADduction, No hip internal rotation  Other Position/Activity Restrictions: Right THR Nov 12, 2024     SUBJECTIVE: Rn ok'ed session. Pt was A&Ox4 and motivated to complete session. Pt voices he would like to return to bed after session d/t increased fatigue after pain medication. Wife present during session.      PAIN: denies pain     Vitals: Vitals not assessed per clinical judgement, see nursing flowsheet    OBJECTIVE:  Bed Mobility:  Rolling to Right: Stand By Assistance, X 1, with head of bed flat, with rail   Supine to Sit: Stand By Assistance, Contact Guard Assistance, X 1, with head of bed flat, with rail    Transfers:  Sit to Stand: Contact Guard Assistance, Minimal Assistance, X 1, cues for hand placement  Stand to Sit:Contact Guard Assistance, X 1, cues for hand placement    Ambulation:  Contact Guard Assistance, 
Pt. Attempting to stand without support of grab bar with unilateral reach when bathing.  Upper Extremity Dressing: Minimal Assistance.  To don [facility] gow  Lower Extremity Dressing: Moderate Assistance, X 1, with set-up, with verbal cues , and with increased time for completion.  To don brief with use of long handled reacher to retrieve while maintaining precautions.  Footwear Management: Stand By Assistance, with set-up, and with verbal cues .  To don socks with sock aid X 2 trials  Toileting: Contact Guard Assistance.  During artem care  Toilet Transfer: Contact Guard Assistance.    Shower Transfer: Contact Guard Assistance and with verbal cues .    .    IADL:   Not Tested    BALANCE:  Sitting Balance:  Supervision. Seated on EOB  Standing Balance: Contact Guard Assistance.      BED MOBILITY:  Supine to Sit: Stand By Assistance      TRANSFERS:  Sit to Stand:  Contact Guard Assistance.    Stand to Sit: Contact Guard Assistance.      FUNCTIONAL MOBILITY:  Assistive Device: Rolling Walker  Assist Level:  Contact Guard Assistance.   Distance: To and from bathroom and Within room  Demo good adherence to TTWB on RLE throughout session.  Pt. Attempting to walk in room without socks educated on increased fall risk.      ADDITIONAL ACTIVITIES:  Precautions reviewed, Pt. Voices an understanding although demo impulsiveness at times requiring cues for safety.         Modified [place] Scale:  Not Applicable    ASSESSMENT:     Activity Tolerance:  Patient tolerance of  treatment: Good treatment tolerance      Plan: Times Per Week: 5-6x  Current Treatment Recommendations: Strengthening, Balance training, Functional mobility training, Endurance training, Pain management, Safety education & training, Patient/Caregiver education & training, Equipment evaluation, education, & procurement, Self-Care / ADL    Education:  Learners: Patient and Family  ADL's, Precautions, Family Education, Equipment Education, Fall Prevention, and 
independence    Expected level of Improvement in Self-Care:  Modified independence    Expected level of Improvement in Sphincter Control:  Modified independence    Expected level of Improvement in Transfers: Modified independence    Expected level of Improvement in Locomotion:  Modified independence    Expected level of Improvement in Communication and Social Cognition: Complete independence    Expected length of time to achieve that level of improvement: 2 weeks    Current rehab issues: ADL dysfunction, bladder management, bowel management,carry over of therapy techniques, discharge planning, disease and co-morbidity management, gait/mobility dysfunction, medication management, nutrition and hydration management, Ongoing assessment of safety, Pain management, Patient and family education, Prevention of secondary complications, Skin Integrity.    Required therapy: Physical Therapy, Occupational Therapy 3 hours per day, 5-6 days per week.  Recreational Therapy 1 hour per week.    Expected Discharge Destination: Home    Expected Post Discharge Treatments: Out Patient    Other information relevant to the care needs:   Lives With: Spouse  Type of Home: House  Home Layout: One level  Home Access: Stairs to enter with rails  Entrance Stairs - Number of Steps: 4 RUPESH with 1 handrail. Pt has a lift gait over the steps if needed.  Bathroom Shower/Tub: Walk-in shower  Bathroom Toilet: Handicap height  Bathroom Equipment: None  Home Equipment: Cane, Walker - 4-Wheeled, Wheelchair - Electric  Has the patient had two or more falls in the past year or any fall with injury in the past year?: Yes (only recent fall leading to admission.)  Prior Level of Assist for ADLs: Needs assistance (Pt IND except for getting sicks on. Wife assists)  Prior Level of Assist for Homemaking: Independent  Homemaking Responsibilities: Yes  Prior Level of Assist for Ambulation: Independent household ambulator, with or without device, Independent 
rhonchi.  Cardiovascular: Normal rate, regular rhythm with normal S1/S2 without murmurs.    No lower extremity edema.   Abdomen: Soft, non-tender, non-distended with normal bowel sounds.  Musculoskeletal: No joint swelling or tenderness. Normal tone. No abnormal movements.   Skin: Warm and dry. No rashes or lesions.  Neurologic:  No focal sensory/motor deficits in the upper or lower extremities. Cranial nerves:  grossly non-focal 2-12.     Psychiatric: Alert and oriented, normal insight and thought content.   Capillary Refill: Brisk,< 3 seconds.  Peripheral Pulses: +2 palpable, equal bilaterally.       Labs/Radiology: See chart or assessment above.     Electronically signed by Solomon Ventura MD on 12/7/2024 at 5:45 PM    
frame for expected duration of plan of care.  If no long-term goals established, a short length of stay is anticipated.    Goals:  Patient goals : be able to return home safely  Short Term Goals  Time Frame for Short Term Goals: until discharge  Short Term Goal 1: Patient will safely complete various functional transfers with SBA and 0-1 vcs to maintain wbing precaution set by MD to improve safety in living environment.  Short Term Goal 2: Patient will improve functional ambulation to/from bathroom with CGA and maintaining wbing restriction in prep for return to PLOF.  Short Term Goal 3: Patient will complete LB dressing/bathing with supervision and use of LHAE prn to maintain precautios set by MD to improve ease in BADL routine.  Short Term Goal 4: Patient will tolerate standing 5-7 minutes with unilateral UE release reaching ouside base of support with SBA to complete sink side grooming.  Long Term Goals  Time Frame for Long Term Goals : None due to ELOS    AM-[facility] Inpatient Daily Activity Raw Score: 17  AM-PAC Inpatient ADL T-Scale Score : 37.26    Following session, patient left in safe position with all fall risk precautions in place.              
left in safe position with all fall risk precautions in place.

## 2024-12-10 NOTE — H&P
Physical Medicine & Rehabilitation Admission History and Physical    Impression:  Status post fall on 12/6/2024 resulting  Proximal right femur nondisplaced oblique periprosthetic fracture  Right lateral lower chest wall and abdomen contusion  Status post right total hip arthroplasty surgery on 11/12/2024 for severe osteoarthritis  Diabetes mellitus of type II with hyperglycemia  History of thymoma with myasthenia gravis requiring thymectomy  Sleep apnea  Hypertension  Hyperlipidemia  Class I obesity with BMI of 30.56  Benign prostate hypertrophy  History of left total hip arthroplasty surgery  History of left knee total knee arthroplasty surgery  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  The patient demonstrates good potential to participate in an inpatient rehabilitation program involving at least 3 hours per day, 5 days per week of intensive rehabilitation.  Rehabilitation services will include PT and OT/RT in order to improve functional status prior to discharge.  Family education and training will be completed.  Equipment evaluations and recommendations will be completed as appropriate.       Rehabilitation nursing will be involved for bowel, bladder, skin, and pain management.  Nursing will also provide education and training to patient and family.    Prophylaxis:  DVT: Lovenox, ARRON stocking, intermittent pneumatic compression device.  GI: Colace, Senokot, GlycoLax as needed, milk of magnesium as needed, Dulcolax suppository as needed, Zofran as needed  Pain: Tylenol, Tylenol as needed, Flexeril as needed, oxycodone 2.5 to 5 mg as needed  And Voltaren gel application to right lateral lower chest/upper abdominal wall painful area  Continue Norvasc for hypertension  Continue CellCept for  42 mm of Hg         EKG (12/6/2024, 9:29 AM) :  Narrative & Impression  Poor data quality, interpretation may be adversely affected  Normal sinus rhythm  Normal ECG  No previous ECGs available        EKG (12/8/2024, 10:58 PM) :  Narrative & Impression  Normal sinus rhythm  Left axis deviation  Abnormal ECG  When compared with ECG of 06-DEC-2024 09:29,  No significant change was found            The post admission physician evaluation (JOSE) is consistent with the pre-admission assessment.  See above findings to reflect the elements required in the JOSE.  Patient's admitting condition is consistent with the findings of the preadmission assessment by the rehabilitation admissions coordinator.    KRISTIN BEAVER MD        This report has been created using voice recognition software. It may contain minor errors which are inherent in voice recognition technology

## 2024-12-11 LAB
25(OH)D3 SERPL-MCNC: 21 NG/ML (ref 30–100)
ALBUMIN SERPL BCG-MCNC: 3.5 G/DL (ref 3.5–5.1)
ALP SERPL-CCNC: 89 U/L (ref 38–126)
ALT SERPL W/O P-5'-P-CCNC: 16 U/L (ref 11–66)
ANION GAP SERPL CALC-SCNC: 9 MEQ/L (ref 8–16)
AST SERPL-CCNC: 19 U/L (ref 5–40)
BASOPHILS ABSOLUTE: 0 THOU/MM3 (ref 0–0.1)
BASOPHILS NFR BLD AUTO: 0.6 %
BILIRUB SERPL-MCNC: 0.4 MG/DL (ref 0.3–1.2)
BUN SERPL-MCNC: 20 MG/DL (ref 7–22)
CALCIUM SERPL-MCNC: 9.2 MG/DL (ref 8.5–10.5)
CHLORIDE SERPL-SCNC: 106 MEQ/L (ref 98–111)
CHOLEST SERPL-MCNC: 95 MG/DL (ref 100–199)
CO2 SERPL-SCNC: 25 MEQ/L (ref 23–33)
CREAT SERPL-MCNC: 0.8 MG/DL (ref 0.4–1.2)
DEPRECATED MEAN GLUCOSE BLD GHB EST-ACNC: 144 MG/DL (ref 70–126)
DEPRECATED RDW RBC AUTO: 45.6 FL (ref 35–45)
EOSINOPHIL NFR BLD AUTO: 9 %
EOSINOPHILS ABSOLUTE: 0.5 THOU/MM3 (ref 0–0.4)
ERYTHROCYTE [DISTWIDTH] IN BLOOD BY AUTOMATED COUNT: 13.9 % (ref 11.5–14.5)
GFR SERPL CREATININE-BSD FRML MDRD: > 90 ML/MIN/1.73M2
GLUCOSE BLD STRIP.AUTO-MCNC: 130 MG/DL (ref 70–108)
GLUCOSE BLD STRIP.AUTO-MCNC: 141 MG/DL (ref 70–108)
GLUCOSE BLD STRIP.AUTO-MCNC: 158 MG/DL (ref 70–108)
GLUCOSE BLD STRIP.AUTO-MCNC: 225 MG/DL (ref 70–108)
GLUCOSE SERPL-MCNC: 137 MG/DL (ref 70–108)
HBA1C MFR BLD HPLC: 6.8 % (ref 4.4–6.4)
HCT VFR BLD AUTO: 32.6 % (ref 42–52)
HDLC SERPL-MCNC: 45 MG/DL
HGB BLD-MCNC: 10.1 GM/DL (ref 14–18)
IMM GRANULOCYTES # BLD AUTO: 0.03 THOU/MM3 (ref 0–0.07)
IMM GRANULOCYTES NFR BLD AUTO: 0.6 %
LDLC SERPL CALC-MCNC: 37 MG/DL
LYMPHOCYTES ABSOLUTE: 0.9 THOU/MM3 (ref 1–4.8)
LYMPHOCYTES NFR BLD AUTO: 18 %
MCH RBC QN AUTO: 27.8 PG (ref 26–33)
MCHC RBC AUTO-ENTMCNC: 31 GM/DL (ref 32.2–35.5)
MCV RBC AUTO: 89.8 FL (ref 80–94)
MONOCYTES ABSOLUTE: 0.4 THOU/MM3 (ref 0.4–1.3)
MONOCYTES NFR BLD AUTO: 8.4 %
NEUTROPHILS ABSOLUTE: 3.2 THOU/MM3 (ref 1.8–7.7)
NEUTROPHILS NFR BLD AUTO: 63.4 %
NRBC BLD AUTO-RTO: 0 /100 WBC
PLATELET # BLD AUTO: 172 THOU/MM3 (ref 130–400)
PMV BLD AUTO: 11 FL (ref 9.4–12.4)
POTASSIUM SERPL-SCNC: 3.9 MEQ/L (ref 3.5–5.2)
PREALB SERPL-MCNC: 15.6 MG/DL (ref 20–40)
PROT SERPL-MCNC: 5.9 G/DL (ref 6.1–8)
RBC # BLD AUTO: 3.63 MILL/MM3 (ref 4.7–6.1)
SODIUM SERPL-SCNC: 140 MEQ/L (ref 135–145)
TRIGL SERPL-MCNC: 63 MG/DL (ref 0–199)
VIT B12 SERPL-MCNC: 216 PG/ML (ref 211–911)
WBC # BLD AUTO: 5.1 THOU/MM3 (ref 4.8–10.8)

## 2024-12-11 PROCEDURE — 85025 COMPLETE CBC W/AUTO DIFF WBC: CPT

## 2024-12-11 PROCEDURE — 80053 COMPREHEN METABOLIC PANEL: CPT

## 2024-12-11 PROCEDURE — 2580000003 HC RX 258: Performed by: NURSE PRACTITIONER

## 2024-12-11 PROCEDURE — 6370000000 HC RX 637 (ALT 250 FOR IP): Performed by: PHYSICAL MEDICINE & REHABILITATION

## 2024-12-11 PROCEDURE — 97116 GAIT TRAINING THERAPY: CPT

## 2024-12-11 PROCEDURE — 1180000000 HC REHAB R&B

## 2024-12-11 PROCEDURE — 97166 OT EVAL MOD COMPLEX 45 MIN: CPT

## 2024-12-11 PROCEDURE — 84134 ASSAY OF PREALBUMIN: CPT

## 2024-12-11 PROCEDURE — 82948 REAGENT STRIP/BLOOD GLUCOSE: CPT

## 2024-12-11 PROCEDURE — 36591 DRAW BLOOD OFF VENOUS DEVICE: CPT

## 2024-12-11 PROCEDURE — 99232 SBSQ HOSP IP/OBS MODERATE 35: CPT | Performed by: PHYSICAL MEDICINE & REHABILITATION

## 2024-12-11 PROCEDURE — 97162 PT EVAL MOD COMPLEX 30 MIN: CPT

## 2024-12-11 PROCEDURE — 82607 VITAMIN B-12: CPT

## 2024-12-11 PROCEDURE — 83036 HEMOGLOBIN GLYCOSYLATED A1C: CPT

## 2024-12-11 PROCEDURE — 82306 VITAMIN D 25 HYDROXY: CPT

## 2024-12-11 PROCEDURE — 6360000002 HC RX W HCPCS: Performed by: PHYSICAL MEDICINE & REHABILITATION

## 2024-12-11 PROCEDURE — 80061 LIPID PANEL: CPT

## 2024-12-11 PROCEDURE — 97535 SELF CARE MNGMENT TRAINING: CPT

## 2024-12-11 PROCEDURE — 6360000002 HC RX W HCPCS: Performed by: NURSE PRACTITIONER

## 2024-12-11 PROCEDURE — 97530 THERAPEUTIC ACTIVITIES: CPT

## 2024-12-11 PROCEDURE — 2580000003 HC RX 258: Performed by: PHYSICAL MEDICINE & REHABILITATION

## 2024-12-11 PROCEDURE — 97110 THERAPEUTIC EXERCISES: CPT

## 2024-12-11 PROCEDURE — 6370000000 HC RX 637 (ALT 250 FOR IP): Performed by: FAMILY MEDICINE

## 2024-12-11 RX ORDER — PYRIDOSTIGMINE BROMIDE 60 MG/1
120 TABLET ORAL 3 TIMES DAILY
Status: DISCONTINUED | OUTPATIENT
Start: 2024-12-11 | End: 2024-12-21 | Stop reason: HOSPADM

## 2024-12-11 RX ORDER — HEPARIN 100 UNIT/ML
500 SYRINGE INTRAVENOUS PRN
Status: DISCONTINUED | OUTPATIENT
Start: 2024-12-11 | End: 2024-12-13

## 2024-12-11 RX ORDER — SODIUM CHLORIDE 0.9 % (FLUSH) 0.9 %
5-40 SYRINGE (ML) INJECTION EVERY 8 HOURS
Status: DISCONTINUED | OUTPATIENT
Start: 2024-12-11 | End: 2024-12-13

## 2024-12-11 RX ORDER — LANOLIN ALCOHOL/MO/W.PET/CERES
1000 CREAM (GRAM) TOPICAL DAILY
Status: DISCONTINUED | OUTPATIENT
Start: 2024-12-11 | End: 2024-12-21 | Stop reason: HOSPADM

## 2024-12-11 RX ORDER — PYRIDOSTIGMINE BROMIDE 60 MG/1
60 TABLET ORAL DAILY PRN
Status: DISCONTINUED | OUTPATIENT
Start: 2024-12-11 | End: 2024-12-21 | Stop reason: HOSPADM

## 2024-12-11 RX ADMIN — FINASTERIDE 5 MG: 5 TABLET, FILM COATED ORAL at 09:17

## 2024-12-11 RX ADMIN — DICLOFENAC SODIUM 2 G: 10 GEL TOPICAL at 20:41

## 2024-12-11 RX ADMIN — PYRIDOSTIGMINE BROMIDE 120 MG: 60 TABLET ORAL at 15:23

## 2024-12-11 RX ADMIN — SODIUM CHLORIDE, PRESERVATIVE FREE 10 ML: 5 INJECTION INTRAVENOUS at 09:23

## 2024-12-11 RX ADMIN — Medication 6 MG: at 20:41

## 2024-12-11 RX ADMIN — ATORVASTATIN CALCIUM 40 MG: 40 TABLET, FILM COATED ORAL at 09:18

## 2024-12-11 RX ADMIN — CALCIUM 500 MG: 500 TABLET ORAL at 17:39

## 2024-12-11 RX ADMIN — DICLOFENAC SODIUM 2 G: 10 GEL TOPICAL at 17:39

## 2024-12-11 RX ADMIN — ROPINIROLE HYDROCHLORIDE 2 MG: 1 TABLET, FILM COATED ORAL at 20:40

## 2024-12-11 RX ADMIN — OXYCODONE HYDROCHLORIDE 5 MG: 5 TABLET ORAL at 22:34

## 2024-12-11 RX ADMIN — OXYCODONE HYDROCHLORIDE 2.5 MG: 5 TABLET ORAL at 12:46

## 2024-12-11 RX ADMIN — Medication 1000 MCG: at 13:58

## 2024-12-11 RX ADMIN — HEPARIN 500 UNITS: 100 SYRINGE at 07:23

## 2024-12-11 RX ADMIN — DICLOFENAC SODIUM 2 G: 10 GEL TOPICAL at 13:59

## 2024-12-11 RX ADMIN — MYCOPHENOLATE MOFETIL 1000 MG: 250 CAPSULE ORAL at 09:17

## 2024-12-11 RX ADMIN — PYRIDOSTIGMINE BROMIDE 60 MG: 60 TABLET ORAL at 06:12

## 2024-12-11 RX ADMIN — PYRIDOSTIGMINE BROMIDE 120 MG: 60 TABLET ORAL at 20:40

## 2024-12-11 RX ADMIN — SODIUM CHLORIDE, PRESERVATIVE FREE 20 ML: 5 INJECTION INTRAVENOUS at 23:11

## 2024-12-11 RX ADMIN — DOCUSATE SODIUM 100 MG: 100 CAPSULE, LIQUID FILLED ORAL at 20:41

## 2024-12-11 RX ADMIN — ACETAMINOPHEN 650 MG: 325 TABLET ORAL at 09:17

## 2024-12-11 RX ADMIN — FLUOXETINE HYDROCHLORIDE 40 MG: 20 CAPSULE ORAL at 09:17

## 2024-12-11 RX ADMIN — DICLOFENAC SODIUM 2 G: 10 GEL TOPICAL at 09:20

## 2024-12-11 RX ADMIN — METFORMIN HYDROCHLORIDE 500 MG: 500 TABLET ORAL at 09:17

## 2024-12-11 RX ADMIN — FAMOTIDINE 20 MG: 20 TABLET, FILM COATED ORAL at 20:41

## 2024-12-11 RX ADMIN — PYRIDOSTIGMINE BROMIDE 60 MG: 60 TABLET ORAL at 09:19

## 2024-12-11 RX ADMIN — TAMSULOSIN HYDROCHLORIDE 0.4 MG: 0.4 CAPSULE ORAL at 09:17

## 2024-12-11 RX ADMIN — DOCUSATE SODIUM 100 MG: 100 CAPSULE, LIQUID FILLED ORAL at 09:18

## 2024-12-11 RX ADMIN — MYCOPHENOLATE MOFETIL 1000 MG: 250 CAPSULE ORAL at 20:41

## 2024-12-11 RX ADMIN — ENOXAPARIN SODIUM 30 MG: 100 INJECTION SUBCUTANEOUS at 20:41

## 2024-12-11 RX ADMIN — METFORMIN HYDROCHLORIDE 500 MG: 500 TABLET ORAL at 15:23

## 2024-12-11 RX ADMIN — CALCIUM 500 MG: 500 TABLET ORAL at 09:18

## 2024-12-11 RX ADMIN — ENOXAPARIN SODIUM 30 MG: 100 INJECTION SUBCUTANEOUS at 09:19

## 2024-12-11 RX ADMIN — FAMOTIDINE 20 MG: 20 TABLET, FILM COATED ORAL at 09:18

## 2024-12-11 ASSESSMENT — PAIN SCALES - GENERAL
PAINLEVEL_OUTOF10: 7
PAINLEVEL_OUTOF10: 6
PAINLEVEL_OUTOF10: 1
PAINLEVEL_OUTOF10: 7
PAINLEVEL_OUTOF10: 3

## 2024-12-11 ASSESSMENT — PAIN DESCRIPTION - ORIENTATION
ORIENTATION: RIGHT;UPPER
ORIENTATION: RIGHT

## 2024-12-11 ASSESSMENT — PAIN - FUNCTIONAL ASSESSMENT
PAIN_FUNCTIONAL_ASSESSMENT: PREVENTS OR INTERFERES WITH ALL ACTIVE AND SOME PASSIVE ACTIVITIES
PAIN_FUNCTIONAL_ASSESSMENT: ACTIVITIES ARE NOT PREVENTED

## 2024-12-11 ASSESSMENT — PAIN DESCRIPTION - DESCRIPTORS
DESCRIPTORS: ACHING;DISCOMFORT
DESCRIPTORS: PINS AND NEEDLES;SHARP;ACHING

## 2024-12-11 ASSESSMENT — PAIN DESCRIPTION - LOCATION
LOCATION: HIP;LEG
LOCATION: HIP

## 2024-12-11 NOTE — PROGRESS NOTES
[facility]  Recreational Therapy  Inpatient Rehabilitation Evaluation        Time Spent with Patient: 25 minutes    Date:  12/11/2024       Patient Name: Duane Scheiderer      MRN: [id number]       YOB: 1950 (74 y.o.)       Gender: male     Referring Practitioner: Xander Phan MD    RESTRICTIONS/PRECAUTIONS:  Restrictions/Precautions: Weight Bearing, ROM Restrictions, General Precautions, Fall Risk          PAIN: 2    SUBJECTIVE:  pt lives with wife in [place]-they have 2 sons OOS    VISION:  Glasses to read     HEARING: Within Normal Limit    LEISURE INTERESTS:   Pt and wife like to go to [employer], [employer] and [employer] shops-they like to go out to eat-they travel to [place] 1 x per year for a month to help out an Aunt-they are active in their [employer]-gave pt a deck of cards to use in free time-very pleasant and social- in the afternoon pt and wife were playing cards in their free time     BARRIERS TO LEISURE INTERESTS:    Decreased endurance, Lower extremity weakness, and Pain           Patient Education  New Education Provided: Importance of Leisure, RT Plan of Care    Plan:  Continue to follow patient through this admission  Include patient in appropriate groups  See patient individually    Electronically signed by: Sidra Escalante, CTRS  Date: 12/11/2024

## 2024-12-11 NOTE — PROGRESS NOTES
[facility]  Individualized Disclosure Statement      Patient: Duane Scheiderer      Scope of Service  [facility] provides 24 hour individualized service to patients with functional limitations due to, but not limited to: stroke, brain injury, spinal cord injury, major multiple trauma, fractures, amputation, and neurological disorders. The [facility] provides rehabilitative nursing and medical services as well as physical, occupational, speech, and recreation therapies.  [facility] is fully accredited by the Commission on Accreditation of Rehabilitation Facilities (CARF) as a comprehensive provider of rehabilitation services.  Patients admitted to the [facility] receive a minimum of three hours of therapy per day, at least five days per week, with a revised therapy schedule on weekends and holidays.  Physical therapy, occupational therapy, and speech therapy are provided seven days per week including holidays.  Other therapeutic services are available on weekends and evenings as needed or scheduled.  Intensity of Treatment  Your treatment program will consist of Nursing Care and:  1.5 hours of Physical Therapy, per day  1.5 hours of Occupational Therapy, per day   30-60 minutes of Speech Therapy, per day  1 hour of Recreational Therapy, per week  Depending on your needs, the exact time spent with each of the above disciplines may fluctuate, however you will receive at least 3 hours of therapy at least 5 days per week.    [facility] maintains contracts with most insurance plans.  Depending on the type of coverage, the insurance may impose limits on the coverage for rehabilitation care.  Coverage is based on the premise that you are able to fully participate in the rehabilitation program and show continued progress. Please verify your own insurance information. A  copy of this was given to the patient/ family on this date.  Insurance Coverage  Your insurance company has made the following determination relative to the length of your stay:   Your estimated length of stay is 14 days   Your insurance Coverage has been verified as follows:    Primary Insurance: Payor: MEDICARE /  /  /    Deductible: $1632.00     Coverage: Active  Secondary Insurance:GP  Secondary insurance policies often cover co-pay amounts, but to ensure payment please contact your insurance company.    Alternative Resources: Please ask the  for more information [phone]

## 2024-12-11 NOTE — PROGRESS NOTES
This Pre Admission Screen is a refiled document from the acute stay. The Pre Admission was completed and signed on 12/10/2024 at 7:32 by Dr. Xander Phan.    Xander Phan MD  Physician  Physical Medicine and Rehabilitation     Progress Notes      Signed     Date of Service: 12/10/2024  6:37 AM   Related encounter: ED to Hosp-Admission (Discharged) from 2024 in [facility] 7[facility]     Signed       Expand All Collapse All    [facility]  Acute Inpatient Rehab Preadmission Assessment     Patient Name: Duane Scheiderer        Ethnicity:Not of , /a, or Macanese origin  Race:White  MRN:   [id number]    : 1950  (74 y.o.)  Gender: male      Admitted from:[facility]  Initial Assessment     Date of admission to the hospital: 2024  9:19 AM  Date patient eligible for admission:2024     Primary Diagnosis: Periprosthetic femur fracture       Did patient have surgery?  yes - RIGHT TOTAL HIP, LATERAL APPROACH 2024 Dr. Croft     Physicians: Kori Garcia, APRN - *, Dr. Phan, Dr. Liu, Dr. Croft     Risk for clinical complications/co-morbidities:   Past Medical History        Past Medical History:   Diagnosis Date    Arthritis      Diabetes mellitus (HCC)     Enlarged prostate     History of blood transfusion      History of thymectomy       In late     Hyperlipidemia       Patient denies on 2024    Myasthenia gravis (HCC) 2013    Pancreatitis       In late     Retention of urine      Sleep apnea       does not use CPAP; diagnosis in early     Thyroid disease 2015     Thyroid enlargement    Torn rotator cuff 2019     right            Financial Information  Primary insurance: Medicare     Secondary Insurance:   GPM     Has the patient had two or more falls in the past year or any fall with injury in the past year?   yes     Did the patient have major surgery during the 100 days prior to admission?  yes

## 2024-12-11 NOTE — PROGRESS NOTES
[facility]  INPATIENT OCCUPATIONAL THERAPY  [facility]  EVALUATION      Discharge Recommendations: Continue to assess pending progress  Equipment Recommendations: Yes (shower chair)        Time In: 0710  Time Out: 0840  Timed Code Treatment Minutes: 75 Minutes  Minutes: 90          Date: 2024  Patient Name: Duane Scheiderer,   Gender: male      MRN: [id number]  : 1950  (74 y.o.)  Referring Practitioner: Xander Phan MD  Diagnosis: Periprosthetic fracture of shaft of femur  Additional Pertinent Hx: 74 y.o. 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 2024: right total hip arthroplasty. His function was improving well postoperatively.  He began using straight cane to assist walking about 2 weeks ago. On 2024, the patient suddenly developed severe pain at the right hip radiating to right thigh to knee level when he tried to stand up to walk.  He then began having increasing difficulty to put weight on his right lower extremity to walk. On 2024 morning, while the patient went to bathroom to have bowel movement, fell forward to the ground landing on his knees with his upper body lean toward his right side over the bathtub.  His wife says the patient was

## 2024-12-11 NOTE — PROGRESS NOTES
Physical Medicine & Rehabilitation Progress Note    Chief Complaint:  Right femur periprosthetic fracture second to fall     Subjective:    Duane Scheiderer is a 74 y.o.  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     The patient says he developed gradual onset right hip and groin pain starting in late 2022 to early 2023.  The painful symptom progressively worsened and occasionally radiating down to his right lower extremity foot area.  He says the pain was particularly worse when he weightbearing and walk on his right lower extremity.  An x-ray of right hip done at [facility] on 12/7/2023 was reported to show mild osteoarthritic change of right hip.  Because of worsening symptoms he had right hip MRI done at the [facility] on 8/29/2024 and revealed severe right hip osteoarthritis with full-thickness chondral defect, moderate joint effusion, and degenerative signal and fraying of the acetabular labrum with paralabral cyst.  Therefore right hip replacement surgery was recommended.  The patient then was admitted to [facility] on 11/12/2024 and underwent right total hip    Gastrointestinal:  Positive for abdominal pain (Right upper lateral abdominal wall). Negative for constipation, diarrhea, nausea and vomiting.   Genitourinary:  Negative for dysuria.   Musculoskeletal:  Positive for gait problem and myalgias (Right thigh). Negative for arthralgias, back pain and neck pain.   Skin:  Negative for rash.   Neurological:  Positive for weakness (Right lower extremity). Negative for dizziness, tremors, facial asymmetry, speech difficulty, light-headedness, numbness and headaches.   Psychiatric/Behavioral:  Positive for sleep disturbance. Negative for dysphoric mood and hallucinations. The patient is not nervous/anxious.         Objective:  /64   Pulse 72   Temp 97.9 °F (36.6 °C) (Oral)   Resp 17   Ht 1.905 m (6' 3\")   Wt 110.9 kg (244 lb 8 oz)   SpO2 99%   BMI 30.56 kg/m²   Physical Exam   General:  well-developed, well nourished obese  male; in no acute distress ; appropriate affect & mood; sitting on wheelchair comfortably  Eyes: pupil equally round ; extra-ocular motion intact bilaterally  Head, Ear, Nose, Mouth & Throat : normocephalic ; no discharge from ears or nose ; no deformity ; no facial swelling ; oral mucosa pink   Neck :  supple ; no tenderness ; no muscle spasm; healed surgical scar at the posterior lower neck  Cardiovascular : regular rate & rhythm ; normal S1 & S2 heart sound ; no murmur ; normal peripheral pulse at the bilateral upper extremities; slightly reduced pulse strength at the bilateral lower extremities  Pulmonary : Breath sound present at bilateral lung field; no wheezing ; no rale: No crackles; no rhonchi; mild tenderness at right lower lateral anterior rib cage edge  Gastrointestinal : soft, protruded abdomen; tenderness at right lateral upper abdomen near the rib cage; normal bowel sound present   Back : no tenderness; no muscle spasm; presence of surgical scar at midline of lumbar spine  Skin: no skin lesion or rash ; no pitting

## 2024-12-11 NOTE — CONSULTS
Department of Family Practice  Consult Note        Reason for Consult:  Medical management while on the Inpatient Rehab unit.    Requesting Physician:  Dr Phan    CHIEF COMPLAINT:   The need to continue the intensive time with therapies following the acute hospital stay.    History Obtained From:  patient, spouse, EMR    HISTORY OF PRESENT ILLNESS:              The patient is a 74 y.o. male with significant past medical history of       Diagnosis Date    Arthritis     Diabetes mellitus (HCC) 2001    Enlarged prostate 2021    History of blood transfusion     History of thymectomy     In late 2000's    Hyperlipidemia     Patient denies on 12/8/2024    Myasthenia gravis ([facility]) 2013    Pancreatitis     In late 2000's    Retention of urine     Sleep apnea     does not use CPAP; diagnosis in early 2000's    Thyroid disease 2015    Thyroid enlargement    Torn rotator cuff 2019    right      who presents with having had a right hip replacement and then later at home had a sudden pain to the area when he was attempting to stand. He was then evaluated and found to have a periprostetic fracture. He did not require another procedure. With him now being more medically stable he has come to the Inpatient Rehab Unit to continue the time with therapies prior to a discharge disposition being made.    Past Medical History:        Diagnosis Date    Arthritis     Diabetes mellitus (HCC) 2001    Enlarged prostate 2021    History of blood transfusion     History of thymectomy     In late 2000's    Hyperlipidemia     Patient denies on 12/8/2024    Myasthenia gravis ([facility]) 2013    Pancreatitis     In late 2000's    Retention of urine     Sleep apnea     does not use CPAP; diagnosis in early 2000's    Thyroid disease 2015    Thyroid enlargement    Torn rotator cuff 2019    right     Past Surgical History:        Procedure Laterality Date    APPENDECTOMY  1970    BLADDER SURGERY  07/2024    UroLift    CARPAL TUNNEL RELEASE Bilateral     After  12/10/2024    Chest wall contusion, right, initial encounter [S20.211A] 12/10/2024    Myasthenia gravis (HCC) [G70.00] 12/10/2024    Essential hypertension [I10] 12/10/2024    Obstructive sleep apnea syndrome [G47.33] 12/10/2024    H/O thymoma [Z87.898] 12/10/2024    Benign prostatic hyperplasia [N40.0] 12/10/2024    Class 1 obesity due to excess calories without serious comorbidity with body mass index (BMI) of 30.0 to 30.9 in adult [E66.811, E66.09, Z68.30] 12/10/2024    Controlled type 2 diabetes mellitus with complication, with long-term current use of insulin (HCC) [E11.8, Z79.4] 09/04/2019      The B12 level was borderline low in the past and the result this AM was lower so I will begin oral supplementation.  He manages the insulin pump.  Begin vit C and iron replacement

## 2024-12-11 NOTE — PLAN OF CARE
Problem: Chronic Conditions and Co-morbidities  Goal: Patient's chronic conditions and co-morbidity symptoms are monitored and maintained or improved  12/11/2024 1203 by Tipton, Rylee, LPN  Outcome: Progressing  Flowsheets (Taken 12/11/2024 1011)  Care Plan - Patient's Chronic Conditions and Co-Morbidity Symptoms are Monitored and Maintained or Improved:   Collaborate with multidisciplinary team to address chronic and comorbid conditions and prevent exacerbation or deterioration   Monitor and assess patient's chronic conditions and comorbid symptoms for stability, deterioration, or improvement     Problem: Discharge Planning  Goal: Discharge to home or other facility with appropriate resources  Outcome: Progressing  Flowsheets (Taken 12/11/2024 1011)  Discharge to home or other facility with appropriate resources:   Identify barriers to discharge with patient and caregiver   Identify discharge learning needs (meds, wound care, etc)     Problem: ABCDS Injury Assessment  Goal: Absence of physical injury  Outcome: Progressing  Flowsheets (Taken 12/11/2024 1203)  Absence of Physical Injury: Implement safety measures based on patient assessment     Problem: Safety - Adult  Goal: Free from fall injury  12/11/2024 1203 by Tipton, Rylee, LPN  Outcome: Progressing  Flowsheets (Taken 12/11/2024 1203)  Free From Fall Injury: Instruct family/caregiver on patient safety     Problem: Pain  Goal: Verbalizes/displays adequate comfort level or baseline comfort level  12/11/2024 1203 by Tipton, Rylee, LPN  Outcome: Progressing  Flowsheets (Taken 12/11/2024 0909)  Verbalizes/displays adequate comfort level or baseline comfort level:   Encourage patient to monitor pain and request assistance   Assess pain using appropriate pain scale   Administer analgesics based on type and severity of pain and evaluate response   Implement non-pharmacological measures as appropriate and evaluate response

## 2024-12-11 NOTE — PROGRESS NOTES
[facility]  INPATIENT REHABILITATION  TEAM CONFERENCE NOTE    Conference Date: 2024  Admit Date:  12/10/2024  1:32 PM  Patient Name: Duane Scheiderer    MRN: [id number]    : 1950  (74 y.o.)  Rehabilitation Admitting Diagnosis:  Periprosthetic fracture of shaft of femur [M97.8XXA, Z96.649]  Referring Practitioner: Xander Phan MD      CASE MANAGEMENT  Current issues/needs regarding patient and family discharge status: Prior to fall and hospitalization, patient was gaining more independence and endurance. Patient lives with spouse, Hannah, of 50+ years. Patient with recent surgery in November and has been receiving home health services through [facility]. Patient reports that therapy had been going well and was recently transitioned to using a cane for ambulation. Patient reports being mostly sedentary at home and homebound. Patient rarely leaves his home other than for medical appointments. Patient's spouse, Hannah, is very supportive and has been providing significant amount of assistance over the last year due to patient's decline. Patient and spouse, Hannah, are both very involved with patient's diabetic medication, as patient manages own insulin pump. Patient follow with endocrinologist in [facility] for diabetes. Patient has PCP that he follows up with regularly. Patient's primary goal is to be able to return home with more independence and endurance, as well as function without pain. Patient is an active , but has not been driving recently due to surgery. Patient's spouse, aHnnah, will continue to be patient's primary support at discharge. Anticipate patient would benefit from continued therapy upon discharge. SW to follow and maintain involvement in discharge planning.     PHYSICAL THERAPY  Patient is a 74 y.o male who presents s/p R total hip arthroplasty on  and a fall on 2024 resulting in femoral fracture and patient placed on TTWB on RLE resulting in a

## 2024-12-11 NOTE — CARE COORDINATION
C/O of legs twitching, states this is the feeling he gets before it spreads t/o his body has a seizure.  Requesting a extra PyRIDostigmine pill.  States he can get up to 8 doses a day.  Request and given a warm shower abd states this is effective in past.  NP Anspach notified and gave OK for order to be repeated x 1.  V/s taken and are WNL.  Side rails padded.  Resource Nurse notified and updated

## 2024-12-11 NOTE — PLAN OF CARE
Problem: Chronic Conditions and Co-morbidities  Goal: Patient's chronic conditions and co-morbidity symptoms are monitored and maintained or improved  12/11/2024 0123 by Erika Carmen RN  Outcome: Progressing  Flowsheets (Taken 12/10/2024 2040)  Care Plan - Patient's Chronic Conditions and Co-Morbidity Symptoms are Monitored and Maintained or Improved: Monitor and assess patient's chronic conditions and comorbid symptoms for stability, deterioration, or improvement     Problem: Discharge Planning  Goal: Discharge to home or other facility with appropriate resources  Recent Flowsheet Documentation  Taken 12/10/2024 2040 by Erika Carmen RN  Discharge to home or other facility with appropriate resources: Identify barriers to discharge with patient and caregiver  12/10/2024 1451 by Savannah Hope RN  Outcome: Progressing  Flowsheets (Taken 12/10/2024 1451)  Discharge to home or other facility with appropriate resources:   Identify barriers to discharge with patient and caregiver   Arrange for needed discharge resources and transportation as appropriate   Identify discharge learning needs (meds, wound care, etc)   Arrange for interpreters to assist at discharge as needed   Refer to discharge planning if patient needs post-hospital services based on physician order or complex needs related to functional status, cognitive ability or social support system     Problem: ABCDS Injury Assessment  Goal: Absence of physical injury  12/10/2024 1451 by Savannah Hope RN  Outcome: Progressing  Flowsheets (Taken 12/10/2024 1451)  Absence of Physical Injury: Implement safety measures based on patient assessment     Problem: Safety - Adult  Goal: Free from fall injury  12/11/2024 0123 by Erika Carmen, RN  Outcome: Progressing     Problem: Pain  Goal: Verbalizes/displays adequate comfort level or baseline comfort level  12/11/2024 0123 by Erika Carmen, RN  Outcome: Progressing  Flowsheets (Taken 12/10/2024

## 2024-12-11 NOTE — PROGRESS NOTES
Received call from primary RN stating mediport to be deaccessed per Dr. Phan.  Spoke to pt about this request - per patient he states he does not want port deaccessed at this time due to being difficult stick.  Notified primary RN.  Dressing on port changed at this time.

## 2024-12-11 NOTE — PROGRESS NOTES
[facility]  INPATIENT PHYSICAL THERAPY  EVALUATION  [facility] - [id number]    Discharge Recommendations: Continue to assess pending progress, Patient would benefit from continued therapy after discharge  Equipment Recommendations: Yes (Continue to assess pending progress (Patient has rollator will likely need RW))               Time In: 1030  Time Out: 1208  Timed Code Treatment Minutes: 78 Minutes  Minutes: 98          Date: 2024  Patient Name: Duane Scheiderer,  Gender:  male        MRN: [id number]  : 1950  (74 y.o.)      Referring Practitioner: Xander Phan MD  Diagnosis: Periprosthetic fracture of shaft of femur  Additional Pertinent Hx: 74 y.o. 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 2024: right total hip arthroplasty. His function was improving well postoperatively.  He began using straight cane to assist walking about 2 weeks ago. On 2024, the patient suddenly developed severe pain at the right hip radiating to right thigh to knee level when he tried to stand up to walk.  He then began having increasing difficulty to put weight on his right lower extremity to walk. On 2024 morning, while the patient went to bathroom to have bowel movement,  with BUE support and Minimal assistance in order to assist with home mobility.  Long Term Goals  Time Frame for Long Term Goals : 2 weeks from IPR evaluation  Long Term Goal 1: Patient to perform sit<>stand transfers with Mod I while maintaining TTWB on RLE in order to assist with safety with transfers in home.  Long Term Goal 2: Patient to ambulate >/=75 feet with use of RW while maintaining TTWB on RLE in order to assist with home mobility.  Long Term Goal 3: Patient to ascend/descend 4 steps with 1 HR and Supervision in order to assist with home entry.  Long Term Goal 4: Patient to perform car transfers with Supervision in order to assist with getting to and from appointments.    Following session, patient left in safe position with all fall risk precautions in place.

## 2024-12-12 LAB
GLUCOSE BLD STRIP.AUTO-MCNC: 119 MG/DL (ref 70–108)
GLUCOSE BLD STRIP.AUTO-MCNC: 146 MG/DL (ref 70–108)
GLUCOSE BLD STRIP.AUTO-MCNC: 151 MG/DL (ref 70–108)
GLUCOSE BLD STRIP.AUTO-MCNC: 156 MG/DL (ref 70–108)

## 2024-12-12 PROCEDURE — 97110 THERAPEUTIC EXERCISES: CPT

## 2024-12-12 PROCEDURE — 6370000000 HC RX 637 (ALT 250 FOR IP): Performed by: PHYSICAL MEDICINE & REHABILITATION

## 2024-12-12 PROCEDURE — 97116 GAIT TRAINING THERAPY: CPT

## 2024-12-12 PROCEDURE — 82948 REAGENT STRIP/BLOOD GLUCOSE: CPT

## 2024-12-12 PROCEDURE — 6370000000 HC RX 637 (ALT 250 FOR IP): Performed by: FAMILY MEDICINE

## 2024-12-12 PROCEDURE — 6360000002 HC RX W HCPCS: Performed by: PHYSICAL MEDICINE & REHABILITATION

## 2024-12-12 PROCEDURE — 97530 THERAPEUTIC ACTIVITIES: CPT

## 2024-12-12 PROCEDURE — 1180000000 HC REHAB R&B

## 2024-12-12 PROCEDURE — 99232 SBSQ HOSP IP/OBS MODERATE 35: CPT | Performed by: PHYSICAL MEDICINE & REHABILITATION

## 2024-12-12 PROCEDURE — 97535 SELF CARE MNGMENT TRAINING: CPT

## 2024-12-12 RX ADMIN — DICLOFENAC SODIUM 2 G: 10 GEL TOPICAL at 15:08

## 2024-12-12 RX ADMIN — Medication 1000 MCG: at 11:31

## 2024-12-12 RX ADMIN — DICLOFENAC SODIUM 2 G: 10 GEL TOPICAL at 11:34

## 2024-12-12 RX ADMIN — MYCOPHENOLATE MOFETIL 1000 MG: 250 CAPSULE ORAL at 21:14

## 2024-12-12 RX ADMIN — ENOXAPARIN SODIUM 30 MG: 100 INJECTION SUBCUTANEOUS at 11:31

## 2024-12-12 RX ADMIN — ACETAMINOPHEN 650 MG: 325 TABLET ORAL at 06:41

## 2024-12-12 RX ADMIN — ACETAMINOPHEN 650 MG: 325 TABLET ORAL at 21:14

## 2024-12-12 RX ADMIN — ACETAMINOPHEN 650 MG: 325 TABLET ORAL at 15:08

## 2024-12-12 RX ADMIN — OXYCODONE HYDROCHLORIDE 5 MG: 5 TABLET ORAL at 21:58

## 2024-12-12 RX ADMIN — DICLOFENAC SODIUM 2 G: 10 GEL TOPICAL at 21:59

## 2024-12-12 RX ADMIN — PYRIDOSTIGMINE BROMIDE 120 MG: 60 TABLET ORAL at 21:14

## 2024-12-12 RX ADMIN — ENOXAPARIN SODIUM 30 MG: 100 INJECTION SUBCUTANEOUS at 21:15

## 2024-12-12 RX ADMIN — OXYCODONE HYDROCHLORIDE 5 MG: 5 TABLET ORAL at 17:56

## 2024-12-12 RX ADMIN — ACETAMINOPHEN 650 MG: 325 TABLET ORAL at 00:10

## 2024-12-12 RX ADMIN — CALCIUM 500 MG: 500 TABLET ORAL at 18:01

## 2024-12-12 RX ADMIN — ROPINIROLE HYDROCHLORIDE 2 MG: 1 TABLET, FILM COATED ORAL at 21:14

## 2024-12-12 RX ADMIN — ATORVASTATIN CALCIUM 40 MG: 40 TABLET, FILM COATED ORAL at 11:31

## 2024-12-12 RX ADMIN — FINASTERIDE 5 MG: 5 TABLET, FILM COATED ORAL at 11:33

## 2024-12-12 RX ADMIN — FLUOXETINE HYDROCHLORIDE 40 MG: 20 CAPSULE ORAL at 11:32

## 2024-12-12 RX ADMIN — MYCOPHENOLATE MOFETIL 1000 MG: 250 CAPSULE ORAL at 11:32

## 2024-12-12 RX ADMIN — PYRIDOSTIGMINE BROMIDE 120 MG: 60 TABLET ORAL at 15:09

## 2024-12-12 RX ADMIN — METFORMIN HYDROCHLORIDE 500 MG: 500 TABLET ORAL at 17:56

## 2024-12-12 RX ADMIN — METFORMIN HYDROCHLORIDE 500 MG: 500 TABLET ORAL at 11:32

## 2024-12-12 RX ADMIN — DICLOFENAC SODIUM 2 G: 10 GEL TOPICAL at 17:56

## 2024-12-12 RX ADMIN — DOCUSATE SODIUM 100 MG: 100 CAPSULE, LIQUID FILLED ORAL at 21:15

## 2024-12-12 RX ADMIN — DOCUSATE SODIUM 100 MG: 100 CAPSULE, LIQUID FILLED ORAL at 11:32

## 2024-12-12 RX ADMIN — AMLODIPINE BESYLATE 5 MG: 5 TABLET ORAL at 11:31

## 2024-12-12 RX ADMIN — CALCIUM 500 MG: 500 TABLET ORAL at 11:32

## 2024-12-12 RX ADMIN — FAMOTIDINE 20 MG: 20 TABLET, FILM COATED ORAL at 11:33

## 2024-12-12 RX ADMIN — TAMSULOSIN HYDROCHLORIDE 0.4 MG: 0.4 CAPSULE ORAL at 11:33

## 2024-12-12 RX ADMIN — FAMOTIDINE 20 MG: 20 TABLET, FILM COATED ORAL at 21:13

## 2024-12-12 RX ADMIN — CYCLOBENZAPRINE 5 MG: 10 TABLET, FILM COATED ORAL at 21:59

## 2024-12-12 RX ADMIN — Medication 6 MG: at 21:15

## 2024-12-12 RX ADMIN — CYCLOBENZAPRINE 5 MG: 10 TABLET, FILM COATED ORAL at 00:12

## 2024-12-12 RX ADMIN — PYRIDOSTIGMINE BROMIDE 120 MG: 60 TABLET ORAL at 11:33

## 2024-12-12 ASSESSMENT — PAIN DESCRIPTION - LOCATION
LOCATION: LEG
LOCATION: LEG;HIP
LOCATION: HIP
LOCATION: RIB CAGE
LOCATION: HIP
LOCATION: HIP
LOCATION: LEG

## 2024-12-12 ASSESSMENT — PAIN DESCRIPTION - DESCRIPTORS
DESCRIPTORS: ACHING
DESCRIPTORS: SPASM;SHARP
DESCRIPTORS: ACHING
DESCRIPTORS: ACHING;DULL
DESCRIPTORS: ACHING

## 2024-12-12 ASSESSMENT — PAIN DESCRIPTION - ORIENTATION
ORIENTATION: RIGHT;UPPER
ORIENTATION: RIGHT
ORIENTATION: RIGHT;UPPER

## 2024-12-12 ASSESSMENT — PAIN SCALES - GENERAL
PAINLEVEL_OUTOF10: 3
PAINLEVEL_OUTOF10: 8
PAINLEVEL_OUTOF10: 4
PAINLEVEL_OUTOF10: 3
PAINLEVEL_OUTOF10: 7
PAINLEVEL_OUTOF10: 7
PAINLEVEL_OUTOF10: 3
PAINLEVEL_OUTOF10: 3

## 2024-12-12 NOTE — PROGRESS NOTES
[facility]  Physical Medicine Case Management Assessment    [x] Inpatient Rehabilitation Unit    Patient Name: Duane Scheiderer        MRN: [id number]    : 1950  (74 y.o.)  Gender: male     Date of Admission: 12/10/2024      Family/Social/Home Environment: Prior to fall and hospitalization, patient was gaining more independence and endurance. Patient lives with spouse, Hannah, of 50+ years. Patient with recent surgery in November and has been receiving home health services through [facility]. Patient reports that therapy had been going well and was recently transitioned to using a cane for ambulation. Patient reports being mostly sedentary at home and homebound. Patient rarely leaves his home other than for medical appointments. Patient's spouse, Hannah, is very supportive and has been providing significant amount of assistance over the last year due to patient's decline. Patient and spouse, Hannah, are both very involved with patient's diabetic medication, as patient manages own insulin pump. Patient follow with endocrinologist in [facility] for diabetes. Patient has PCP that he follows up with regularly. Patient's primary goal is to be able to return home with more independence and endurance, as well as function without pain. Patient is an active , but has not been driving recently due to surgery. Patient's spouse, Hannah, will continue to be patient's primary support at discharge.      Social/Functional History  Lives With: Spouse (brother in law)  Type of Home: House  Home Layout: Two level  Home Access: Stairs to enter with rails  Entrance Stairs - Number of Steps: 4 RUPESH with 1 handrail. Pt has a lift gait over the steps if needed.  Bathroom Shower/Tub: Walk-in shower  Bathroom Toilet: Handicap height  Bathroom Equipment: Hand-held shower, Toilet raiser  Bathroom Accessibility: Walker accessible  Home Equipment: Cane, Walker - 4-Wheeled, Wheelchair - Electric (Transport Chair)  Has

## 2024-12-12 NOTE — CARE COORDINATION
Went over rationale and ed on ways to prevent falls with nonskid socks, gait belt.  Went over pain management and use of Flexeril for right upper leg kit spasm.  Also stood up and ambulated to restroom help with his pain control.   States his pain control goal is a 4,  states he is happy with his pain control.  Went over rationale of keeping right leg in alignment and therapy guidelines for hip precautions

## 2024-12-12 NOTE — PLAN OF CARE
Individualized Plan of Care  [facility] Inpatient Rehabilitation Unit    Rehabilitation physician: Dr. Phan    Admit Date: 12/10/2024     Rehabilitation Diagnosis: Periprosthetic fracture of shaft of femur [M97.8XXA, Z96.649]      Rehabilitation impairments: self care, mobility, bowel/bladder management, pain management, and safety    Factors facilitating achievement of predicted outcomes: Family support, Motivated, Cooperative, and Pleasant  Barriers to the achievement of predicted outcomes: Pain, Unrealistic expectations, Decreased endurance, Lower extremity weakness, Stairs at home, Skin Care, and Wound Care    Patient Goals: Improve independence with mobility, Improvement of mobility at a wheelchair level, Increase overall strength and endurance, Increase balance, Increase endurance, Increase independence with activities of daily living, Improve cognition, Increase self-awareness, Increase safety awareness, Increase community integration, Increase socialization, Functional communication with caregivers, Integrate appropriate pain management plan, Assure adequate nutritional option for discharge, Continence of bowel and bladder, and Provide appropriate patient and family education      NURSING:  Nursing goals for Duane Scheiderer while on the rehabilitation unit will include:  Continence of bowel and bladder, Adequate number of bowel movements, Urinate with no urinary retention >300ml in bladder, Maintain O2 SATs at an acceptable level during stay, Effective pain management while on the rehabilitation unit, Establish adequate pain control plan for discharge, Absence of skin breakdown while on the rehabilitation unit, Improved skin integrity via assessments including wound measurements, Avoidance of any hospital acquired infections, No signs/symptoms of infection at the wound site, Freedom from injury during hospitalization, and Complete education with patient/family with understanding demonstrated  regarding disease process and resultant impairment     In order to achieve these goals, nursing interventions may include bowel/bladder training, education for medical assistive devices, medication education, O2 saturation management, energy conservation, stress management techniques, fall prevention, alarms protocol, seating and positioning, skin/wound care, pressure relief instruction, dressing changes, infection protection, DVT prophylaxis, assistance with safe transfers , and/or assistance with bathroom activities and hygiene.       PHYSICAL THERAPY:  Goals:        Short Term Goals  Time Frame for Short Term Goals: 1 week  Short Term Goal 1: Patient to perform bed mobility supine<>sit with Mod I in order to assist with getting into and out of bed.  Short Term Goal 2: Patient to perform sit<>stand transfers with use of RW and Supervision from various surface heights while maintaining TTWB on RLE in order to assist with safety with transfers in home.  Short Term Goal 3: Patient to ambulate >/=50 feet with RW and TTWB on RLE in order to assist with home mobility  Short Term Goal 4: Patient to ascend/descend 2 steps in parallel bars with BUE support and Minimal assistance in order to assist with home mobility.  Long Term Goals  Time Frame for Long Term Goals : 2 weeks from IPR evaluation  Long Term Goal 1: Patient to perform sit<>stand transfers with Mod I while maintaining TTWB on RLE in order to assist with safety with transfers in home.  Long Term Goal 2: Patient to ambulate >/=75 feet with use of RW while maintaining TTWB on RLE in order to assist with home mobility.  Long Term Goal 3: Patient to ascend/descend 4 steps with 1 HR and Supervision in order to assist with home entry.  Long Term Goal 4: Patient to perform car transfers with Supervision in order to assist with getting to and from appointments.    Plan of Care: Patient to be seen by physical therapy services  (5x/wk 90 min)       Anticipated

## 2024-12-12 NOTE — PROGRESS NOTES
[facility]  INPATIENT PHYSICAL THERAPY  DAILY NOTE  [facility] - [id number]      Discharge Recommendations: Continue to assess pending progress and Patient would benefit from continued PT at discharge  Equipment Recommendations: Yes (Continue to assess pending progress (Patient has rollator will likely need RW))               Time In: 0930  Time Out: 1100  Timed Code Treatment Minutes: 90 Minutes  Minutes: 90          Date: 2024  Patient Name: Duane Scheiderer,  Gender:  male        MRN: [id number]  : 1950  (74 y.o.)     Referring Practitioner: Xander Phan MD  Diagnosis: Periprosthetic fracture of shaft of femur  Additional Pertinent Hx: 74 y.o. 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 2024: right total hip arthroplasty. His function was improving well postoperatively.  He began using straight cane to assist walking about 2 weeks ago. On 2024, the patient suddenly developed severe pain at the right hip radiating to right thigh to knee level when he tried to stand up to walk.  He then began having increasing difficulty to put weight on his right lower extremity to walk. On 2024 morning, while the patient went to bathroom to have bowel movement, fell

## 2024-12-12 NOTE — PROGRESS NOTES
[facility]  Occupational Therapy  Daily Note    Discharge Recommendations: Continue to assess pending progress and Patient would benefit from continued OT at discharge  Equipment Recommendations: Yes (shower chair)         Time In: 0800  Time Out: 0830  Timed Code Treatment Minutes: 30 Minutes  Minutes: 30          Date: 2024  Patient Name: Duane Scheiderer,   Gender: male      Room: [address] [place] [address]  MRN: [id number]  : 1950  (74 y.o.)  Referring Practitioner: Xander Phan MD  Diagnosis: Periprosthetic fracture of shaft of femur  Additional Pertinent Hx: 74 y.o. 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 2024: right total hip arthroplasty. His function was improving well postoperatively.  He began using straight cane to assist walking about 2 weeks ago. On 2024, the patient suddenly developed severe pain at the right hip radiating to right thigh to knee level when he tried to stand up to walk.  He then began having increasing difficulty to put weight on his right lower extremity to walk. On 2024 morning, while the patient went to bathroom to have bowel movement, fell forward to the ground landing on his knees with his upper body lean toward his

## 2024-12-12 NOTE — PLAN OF CARE
Problem: Chronic Conditions and Co-morbidities  Goal: Patient's chronic conditions and co-morbidity symptoms are monitored and maintained or improved  Outcome: Progressing  Flowsheets (Taken 12/12/2024 1804)  Care Plan - Patient's Chronic Conditions and Co-Morbidity Symptoms are Monitored and Maintained or Improved: Monitor and assess patient's chronic conditions and comorbid symptoms for stability, deterioration, or improvement     Problem: Discharge Planning  Goal: Discharge to home or other facility with appropriate resources  12/12/2024 1804 by Helen Norman LPN  Outcome: Progressing  Flowsheets (Taken 12/12/2024 1804)  Discharge to home or other facility with appropriate resources: Identify barriers to discharge with patient and caregiver  12/12/2024 1133 by Jenna Russell LISW-S  Note: Team conference held Thursday, 12/12. Recommendations of the team were explained to the patient and his wife, Hannah by Dr Phan and SW. Team is recommending that patient continue on acute inpatient rehab for PT and OT, with expected discharge date of Saturday, 12/21. Following discharge, team is recommending home health vs outpatient therapy services pending patient's progress. Care plan reviewed with patient and Hannah. Patient and Hannah verbalized understanding of the plan of care and contributed to goal setting. SW to follow and maintain involvement in discharge planning.      Problem: ABCDS Injury Assessment  Goal: Absence of physical injury  Outcome: Progressing  Flowsheets (Taken 12/12/2024 1804)  Absence of Physical Injury: Implement safety measures based on patient assessment     Problem: Safety - Adult  Goal: Free from fall injury  Outcome: Progressing  Flowsheets (Taken 12/12/2024 1804)  Free From Fall Injury: Instruct family/caregiver on patient safety     Problem: Pain  Goal: Verbalizes/displays adequate comfort level or baseline comfort level  Outcome: Progressing  Flowsheets (Taken 12/12/2024

## 2024-12-12 NOTE — PLAN OF CARE
Problem: Discharge Planning  Goal: Discharge to home or other facility with appropriate resources  12/12/2024 1133 by Jenna Russell LISW-S  Note: Team conference held Thursday, 12/12. Recommendations of the team were explained to the patient and his wife, Hannah by Dr Phan and SW. Team is recommending that patient continue on acute inpatient rehab for PT and OT, with expected discharge date of Saturday, 12/21. Following discharge, team is recommending home health vs outpatient therapy services pending patient's progress. Care plan reviewed with patient and Hannah. Patient and Hannah verbalized understanding of the plan of care and contributed to goal setting. SW to follow and maintain involvement in discharge planning.

## 2024-12-12 NOTE — CARE COORDINATION
Patient has mediport that is accessed. There is an order for normal saline flushes but not to heparin lock the port. Talked with manager Shanell Alcantara about this to hopefully clarify the order. Patient states he is 99% sure it is currently heparin locked at the moment. This nurse called MAXIMILIANO Macdonald about this and was advised that if patient states he believes it is heparin locked at the moment to not flush with normal saline until order is clarified.

## 2024-12-12 NOTE — PLAN OF CARE
Problem: Chronic Conditions and Co-morbidities  Goal: Patient's chronic conditions and co-morbidity symptoms are monitored and maintained or improved  12/12/2024 0147 by Erika Carmen RN  Outcome: Progressing  Flowsheets (Taken 12/11/2024 2018)  Care Plan - Patient's Chronic Conditions and Co-Morbidity Symptoms are Monitored and Maintained or Improved: Collaborate with multidisciplinary team to address chronic and comorbid conditions and prevent exacerbation or deterioration     Problem: Discharge Planning  Goal: Discharge to home or other facility with appropriate resources  12/12/2024 0147 by Erika Carmen RN  Outcome: Progressing  Flowsheets (Taken 12/11/2024 2018)  Discharge to home or other facility with appropriate resources: Identify barriers to discharge with patient and caregiver     Problem: ABCDS Injury Assessment  Goal: Absence of physical injury  12/11/2024 1203 by Tipton, Rylee, LPN  Outcome: Progressing  Flowsheets (Taken 12/11/2024 1203)  Absence of Physical Injury: Implement safety measures based on patient assessment     Problem: ABCDS Injury Assessment  Goal: Absence of physical injury  12/11/2024 1203 by Tipton, Rylee, LPN  Outcome: Progressing  Flowsheets (Taken 12/11/2024 1203)  Absence of Physical Injury: Implement safety measures based on patient assessment     Problem: Safety - Adult  Goal: Free from fall injury  12/12/2024 0147 by Erika Carmen RN  Outcome: Progressing     Problem: Pain  Goal: Verbalizes/displays adequate comfort level or baseline comfort level  12/12/2024 0147 by Erika Carmen RN  Outcome: Progressing

## 2024-12-13 LAB
GLUCOSE BLD STRIP.AUTO-MCNC: 116 MG/DL (ref 70–108)
GLUCOSE BLD STRIP.AUTO-MCNC: 120 MG/DL (ref 70–108)
GLUCOSE BLD STRIP.AUTO-MCNC: 126 MG/DL (ref 70–108)
GLUCOSE BLD STRIP.AUTO-MCNC: 127 MG/DL (ref 70–108)
GLUCOSE BLD STRIP.AUTO-MCNC: 217 MG/DL (ref 70–108)

## 2024-12-13 PROCEDURE — 97110 THERAPEUTIC EXERCISES: CPT

## 2024-12-13 PROCEDURE — 97530 THERAPEUTIC ACTIVITIES: CPT

## 2024-12-13 PROCEDURE — 97116 GAIT TRAINING THERAPY: CPT

## 2024-12-13 PROCEDURE — 1180000000 HC REHAB R&B

## 2024-12-13 PROCEDURE — 82948 REAGENT STRIP/BLOOD GLUCOSE: CPT

## 2024-12-13 PROCEDURE — 97535 SELF CARE MNGMENT TRAINING: CPT

## 2024-12-13 PROCEDURE — 99232 SBSQ HOSP IP/OBS MODERATE 35: CPT | Performed by: PHYSICAL MEDICINE & REHABILITATION

## 2024-12-13 PROCEDURE — 6370000000 HC RX 637 (ALT 250 FOR IP): Performed by: FAMILY MEDICINE

## 2024-12-13 PROCEDURE — 97542 WHEELCHAIR MNGMENT TRAINING: CPT

## 2024-12-13 PROCEDURE — 6370000000 HC RX 637 (ALT 250 FOR IP): Performed by: PHYSICAL MEDICINE & REHABILITATION

## 2024-12-13 PROCEDURE — 6360000002 HC RX W HCPCS: Performed by: PHYSICAL MEDICINE & REHABILITATION

## 2024-12-13 RX ORDER — HEPARIN 100 UNIT/ML
500 SYRINGE INTRAVENOUS DAILY
Status: DISCONTINUED | OUTPATIENT
Start: 2024-12-13 | End: 2024-12-17

## 2024-12-13 RX ORDER — SODIUM CHLORIDE 0.9 % (FLUSH) 0.9 %
5-40 SYRINGE (ML) INJECTION DAILY
Status: DISCONTINUED | OUTPATIENT
Start: 2024-12-14 | End: 2024-12-17

## 2024-12-13 RX ADMIN — FINASTERIDE 5 MG: 5 TABLET, FILM COATED ORAL at 09:58

## 2024-12-13 RX ADMIN — FAMOTIDINE 20 MG: 20 TABLET, FILM COATED ORAL at 09:58

## 2024-12-13 RX ADMIN — AMLODIPINE BESYLATE 5 MG: 5 TABLET ORAL at 09:58

## 2024-12-13 RX ADMIN — ATORVASTATIN CALCIUM 40 MG: 40 TABLET, FILM COATED ORAL at 09:58

## 2024-12-13 RX ADMIN — ROPINIROLE HYDROCHLORIDE 2 MG: 1 TABLET, FILM COATED ORAL at 21:57

## 2024-12-13 RX ADMIN — PYRIDOSTIGMINE BROMIDE 120 MG: 60 TABLET ORAL at 15:08

## 2024-12-13 RX ADMIN — CALCIUM 500 MG: 500 TABLET ORAL at 17:08

## 2024-12-13 RX ADMIN — PYRIDOSTIGMINE BROMIDE 120 MG: 60 TABLET ORAL at 09:57

## 2024-12-13 RX ADMIN — DICLOFENAC SODIUM 2 G: 10 GEL TOPICAL at 21:58

## 2024-12-13 RX ADMIN — ACETAMINOPHEN 650 MG: 325 TABLET ORAL at 21:58

## 2024-12-13 RX ADMIN — ENOXAPARIN SODIUM 30 MG: 100 INJECTION SUBCUTANEOUS at 09:56

## 2024-12-13 RX ADMIN — ENOXAPARIN SODIUM 30 MG: 100 INJECTION SUBCUTANEOUS at 21:57

## 2024-12-13 RX ADMIN — CYCLOBENZAPRINE 5 MG: 10 TABLET, FILM COATED ORAL at 21:56

## 2024-12-13 RX ADMIN — OXYCODONE HYDROCHLORIDE 5 MG: 5 TABLET ORAL at 21:57

## 2024-12-13 RX ADMIN — DICLOFENAC SODIUM 2 G: 10 GEL TOPICAL at 15:08

## 2024-12-13 RX ADMIN — Medication 1000 MCG: at 09:56

## 2024-12-13 RX ADMIN — OXYCODONE HYDROCHLORIDE 5 MG: 5 TABLET ORAL at 09:57

## 2024-12-13 RX ADMIN — HEPARIN 500 UNITS: 100 SYRINGE at 18:54

## 2024-12-13 RX ADMIN — METFORMIN HYDROCHLORIDE 500 MG: 500 TABLET ORAL at 17:08

## 2024-12-13 RX ADMIN — FLUOXETINE HYDROCHLORIDE 40 MG: 20 CAPSULE ORAL at 09:57

## 2024-12-13 RX ADMIN — ACETAMINOPHEN 650 MG: 325 TABLET ORAL at 09:57

## 2024-12-13 RX ADMIN — MYCOPHENOLATE MOFETIL 1000 MG: 250 CAPSULE ORAL at 09:58

## 2024-12-13 RX ADMIN — FAMOTIDINE 20 MG: 20 TABLET, FILM COATED ORAL at 21:56

## 2024-12-13 RX ADMIN — PYRIDOSTIGMINE BROMIDE 120 MG: 60 TABLET ORAL at 21:57

## 2024-12-13 RX ADMIN — DOCUSATE SODIUM 100 MG: 100 CAPSULE, LIQUID FILLED ORAL at 21:58

## 2024-12-13 RX ADMIN — SENNOSIDES 8.6 MG: 8.6 TABLET, FILM COATED ORAL at 21:57

## 2024-12-13 RX ADMIN — METFORMIN HYDROCHLORIDE 500 MG: 500 TABLET ORAL at 09:57

## 2024-12-13 RX ADMIN — DOCUSATE SODIUM 100 MG: 100 CAPSULE, LIQUID FILLED ORAL at 09:58

## 2024-12-13 RX ADMIN — CALCIUM 500 MG: 500 TABLET ORAL at 09:57

## 2024-12-13 RX ADMIN — MYCOPHENOLATE MOFETIL 1000 MG: 250 CAPSULE ORAL at 22:03

## 2024-12-13 RX ADMIN — TAMSULOSIN HYDROCHLORIDE 0.4 MG: 0.4 CAPSULE ORAL at 09:58

## 2024-12-13 RX ADMIN — ACETAMINOPHEN 650 MG: 325 TABLET ORAL at 15:08

## 2024-12-13 RX ADMIN — Medication 6 MG: at 21:57

## 2024-12-13 ASSESSMENT — PAIN DESCRIPTION - DESCRIPTORS
DESCRIPTORS: ACHING
DESCRIPTORS: ACHING

## 2024-12-13 ASSESSMENT — PAIN DESCRIPTION - LOCATION
LOCATION: HIP
LOCATION: HIP;LEG

## 2024-12-13 ASSESSMENT — PAIN SCALES - GENERAL
PAINLEVEL_OUTOF10: 3
PAINLEVEL_OUTOF10: 7
PAINLEVEL_OUTOF10: 7

## 2024-12-13 ASSESSMENT — PAIN DESCRIPTION - ORIENTATION
ORIENTATION: RIGHT
ORIENTATION: RIGHT

## 2024-12-13 NOTE — PLAN OF CARE
Problem: Discharge Planning  Goal: Discharge to home or other facility with appropriate resources  12/12/2024 2350 by Derrell Jefferson, RN  Outcome: Progressing  Flowsheets (Taken 12/12/2024 2100)  Discharge to home or other facility with appropriate resources: Identify barriers to discharge with patient and caregiver     Problem: ABCDS Injury Assessment  Goal: Absence of physical injury  12/12/2024 2350 by Derrell Jefferson, RN  Outcome: Progressing     Problem: Safety - Adult  Goal: Free from fall injury  12/12/2024 2350 by Derrell Jefferson, RN  Outcome: Progressing     Problem: Pain  Goal: Verbalizes/displays adequate comfort level or baseline comfort level  12/12/2024 2350 by Derrell Jefferson, RN  Outcome: Progressing  Flowsheets (Taken 12/12/2024 2158)  Verbalizes/displays adequate comfort level or baseline comfort level: Encourage patient to monitor pain and request assistance

## 2024-12-13 NOTE — CARE COORDINATION
Patient is oriented, calm and coordinated well with the care plan. He had some episodes of pain on his hip and back, pain medications and reposition done. Pt has a Mediport that is accessed. With continues glucose monitor attached to his left upper arm. Pt also has patient supplied insulin pump. V/S within normal limits. Electronically signed by Derrell Jefferson RN on 12/13/2024 at 4:14 AM

## 2024-12-13 NOTE — PROGRESS NOTES
[facility]  Recreational Therapy  Daily Note  [facility]    Time Spent with Patient: 25 minutes    Date:  12/13/2024       Patient Name: Duane Scheiderer      MRN: [id number]      YOB: 1950 (74 y.o.)       Gender: male     Referring Practitioner: Xander Phan MD    RESTRICTIONS/PRECAUTIONS:  Restrictions/Precautions: Weight Bearing, ROM Restrictions, General Precautions, Fall Risk     Hearing: Within functional limits    PAIN: 0    SUBJECTIVE:  I slept very well last night    OBJECTIVE:  Pt sitting on the north pod had just finished his breakfast-pt very pleasant and social-doesn't like to stay in his room and wife states she makes him help her with a puzzle on the unit just to get him out-he does enjoy euchre and RT introduced his wife with peers wife and they were able to talk and possibly get together to play cards during their stay-also showed pts wife where the game cupboard is and where there are more puzzles too-appreciative          Patient Education  New Education Provided: Importance of Leisure,     Electronically signed by: Sidra Escalante, CTRS  Date: 12/13/2024

## 2024-12-13 NOTE — PROGRESS NOTES
[facility]  Occupational Therapy  Daily Note    Discharge Recommendations: Patient would benefit from continued OT at discharge  Equipment Recommendations: Yes (shower chair)         Time In: 1030  Time Out: 1200  Timed Code Treatment Minutes: 90 Minutes  Minutes: 90          Date: 2024  Patient Name: Duane Scheiderer,   Gender: male      Room: [address] [place] [address]  MRN: [id number]  : 1950  (74 y.o.)  Referring Practitioner: Xander Phan MD  Diagnosis: Periprosthetic fracture of shaft of femur  Additional Pertinent Hx: 74 y.o. 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 2024: right total hip arthroplasty. His function was improving well postoperatively.  He began using straight cane to assist walking about 2 weeks ago. On 2024, the patient suddenly developed severe pain at the right hip radiating to right thigh to knee level when he tried to stand up to walk.  He then began having increasing difficulty to put weight on his right lower extremity to walk. On 2024 morning, while the patient went to bathroom to have bowel movement, fell forward to the ground landing on his knees with his upper body lean toward his right side over the bathtub.  His wife

## 2024-12-13 NOTE — PROGRESS NOTES
[facility]  INPATIENT PHYSICAL THERAPY  DAILY NOTE  [facility] - [id number]      Discharge Recommendations: Continue to assess pending progress and Patient would benefit from continued PT at discharge  Equipment Recommendations: Yes (Continue to assess pending progress (Patient has rollator will likely need RW))               Time In: 0700  Time Out: 0830  Timed Code Treatment Minutes: 90 Minutes  Minutes: 90          Date: 2024  Patient Name: Duane Scheiderer,  Gender:  male        MRN: [id number]  : 1950  (74 y.o.)     Referring Practitioner: Xander Phan MD  Diagnosis: Periprosthetic fracture of shaft of femur  Additional Pertinent Hx: 74 y.o. 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 2024: right total hip arthroplasty. His function was improving well postoperatively.  He began using straight cane to assist walking about 2 weeks ago. On 2024, the patient suddenly developed severe pain at the right hip radiating to right thigh to knee level when he tried to stand up to walk.  He then began having increasing difficulty to put weight on his right lower extremity to walk. On 2024 morning, while the patient went to bathroom to have bowel movement, fell

## 2024-12-13 NOTE — PROGRESS NOTES
Physical Medicine & Rehabilitation Progress Note    Chief Complaint:  Right femur periprosthetic fracture second to fall     Subjective:    Duane Scheiderer is a 74 y.o.  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     The patient says he developed gradual onset right hip and groin pain starting in late 2022 to early 2023.  The painful symptom progressively worsened and occasionally radiating down to his right lower extremity foot area.  He says the pain was particularly worse when he weightbearing and walk on his right lower extremity.  An x-ray of right hip done at [facility] on 12/7/2023 was reported to show mild osteoarthritic change of right hip.  Because of worsening symptoms he had right hip MRI done at the [facility] on 8/29/2024 and revealed severe right hip osteoarthritis with full-thickness chondral defect, moderate joint effusion, and degenerative signal and fraying of the acetabular labrum with paralabral cyst.  Therefore right hip replacement surgery was recommended.  The patient then was admitted to [facility] on 11/12/2024 and underwent right total hip  asymmetry, speech difficulty, light-headedness, numbness and headaches.   Psychiatric/Behavioral:  Negative for dysphoric mood, hallucinations and sleep disturbance. The patient is not nervous/anxious.         Objective:  /63   Pulse 75   Temp 97.5 °F (36.4 °C) (Oral)   Resp 18   Ht 1.905 m (6' 3\")   Wt 110.9 kg (244 lb 8 oz)   SpO2 98%   BMI 30.56 kg/m²   Physical Exam   General:  well-developed, well nourished obese  male; in no acute distress ; appropriate affect & mood; sitting on reclining chair comfortably  Eyes: pupil equally round ; extra-ocular motion intact bilaterally  Head, Ear, Nose, Mouth & Throat : normocephalic ; no discharge from ears or nose ; no deformity ; no facial swelling ; oral mucosa pink   Neck :  supple ; no tenderness ; no muscle spasm; healed surgical scar at the posterior lower neck  Cardiovascular : regular rate & rhythm ; normal S1 & S2 heart sound ; no murmur ; normal peripheral pulse at the bilateral upper extremities; slightly reduced pulse strength at the bilateral lower extremities  Pulmonary : Breath sound present at bilateral lung field; no wheezing ; no rale: No crackles; no rhonchi; mild tenderness at right lower lateral anterior rib cage edge  Gastrointestinal : soft, protruded abdomen; tenderness at right lateral upper abdomen near the rib cage; normal bowel sound present   Back : no tenderness; no muscle spasm; presence of surgical scar at midline of lumbar spine  Skin: no skin lesion or rash ; no pitting edema at all 4 extremities; presence of new surgical scar at right lateral hip to upper thigh with scab without visible discharge with diminishing mild erythema surrounding the scar; healed old surgical scar at the anterior left knee  Musculoskeletal : no limb asymmetry; slightly obese limbs; no limb deformity; no tenderness at bilateral upper & lower extremities; no palpable mass at limbs ; right hip joint laxity not assessed; no joints laxity or

## 2024-12-13 NOTE — PROGRESS NOTES
Patient educated on how to use incentive spirometer. Patient verbalized understanding and demonstrated proper use. Emphasized importance and usage of device, with coughing and deep breathing every 2 hours while awake.

## 2024-12-13 NOTE — PLAN OF CARE
Problem: Chronic Conditions and Co-morbidities  Goal: Patient's chronic conditions and co-morbidity symptoms are monitored and maintained or improved  12/13/2024 1310 by Vonda Alfredo LPN  Outcome: Progressing  Flowsheets (Taken 12/13/2024 1310)  Care Plan - Patient's Chronic Conditions and Co-Morbidity Symptoms are Monitored and Maintained or Improved: Monitor and assess patient's chronic conditions and comorbid symptoms for stability, deterioration, or improvement     Problem: Discharge Planning  Goal: Discharge to home or other facility with appropriate resources  12/13/2024 1310 by Vonda Alfredo LPN  Outcome: Progressing  Flowsheets (Taken 12/13/2024 1310)  Discharge to home or other facility with appropriate resources: Identify barriers to discharge with patient and caregiver  Note: Patient is planning to be discharged 12/21/24.     Problem: ABCDS Injury Assessment  Goal: Absence of physical injury  12/13/2024 1310 by Vonda Alfredo LPN  Outcome: Progressing  Flowsheets (Taken 12/13/2024 1310)  Absence of Physical Injury: Implement safety measures based on patient assessment     Problem: Safety - Adult  Goal: Free from fall injury  12/13/2024 1310 by Vonda Alfredo LPN  Outcome: Progressing  Flowsheets (Taken 12/13/2024 1310)  Free From Fall Injury: Instruct family/caregiver on patient safety     Problem: Pain  Goal: Verbalizes/displays adequate comfort level or baseline comfort level  12/13/2024 1310 by Vonda Alfredo LPN  Outcome: Progressing  Flowsheets (Taken 12/13/2024 1310)  Verbalizes/displays adequate comfort level or baseline comfort level:   Encourage patient to monitor pain and request assistance   Assess pain using appropriate pain scale   Administer analgesics based on type and severity of pain and evaluate response   Implement non-pharmacological measures as appropriate and evaluate response

## 2024-12-13 NOTE — PROGRESS NOTES
Comprehensive Nutrition Assessment    Type and Reason for Visit:  Initial, Positive nutrition screen, Consult (Poor Intake/Appetite 5 or more days; unintentional weight loss)    Nutrition Recommendations/Plan:   Continue current diet - encouraged pt to order double eggs or meats if still hungry.  Encouraged po, good nutrition at best efforts.  Encouraged continued compliance with CHO controlled diet.  Pt. And wife deny any questions.  Recommend MVI.     Malnutrition Assessment:  Malnutrition Status:  No malnutrition (12/13/24 1331)    Context:  Acute Illness     Findings of the 6 clinical characteristics of malnutrition:  Energy Intake:  No decrease in energy intake  Weight Loss:  No weight loss     Body Fat Loss:  No body fat loss     Muscle Mass Loss:  No muscle mass loss    Fluid Accumulation:  Unable to assess     Strength:  Not Performed    Nutrition Assessment:     Pt. Seen w/ wife this am; reports good appetite and intake; eating well and tolerating diet.  State have been trying to watch what they eat and lose weight.  Has insulin pump; watches CHO intake.  Reports hungry at times in hospital - discussed ability to order additional serving of eggs, meats, etc.  Pt. Reports HgbA1c was as high as 9.1%; very pleased with current level.  12/11/24: HgbA1c 6.8%; 12/11: Glucose 137, Cholesterol 95, HDL 45, LDL 37, Triglycerides 63  Rx includes Insulin, Glucophage, Senokot, Glycolax, Vitamin B12, D     BM 12/13    Nutrition Related Findings:      Wound Type: Surgical Incision (11/12/24: hip surgery)       Current Nutrition Intake & Therapies:    Average Meal Intake: 51-75%, %     ADULT DIET; Regular; 4 carb choices (60 gm/meal)    Anthropometric Measures:  Height: 190.5 cm (6' 3\")  Ideal Body Weight (IBW): 196 lbs (89 kg)    Admission Body Weight: 110.9 kg (244 lb 8 oz) (12/10 +1 edema)  Current Body Weight: 110.9 kg (244 lb 8 oz) (12/10 +1 edema),     Current BMI (kg/m2): 30.6  Usual Body Weight:  (per pt.

## 2024-12-14 LAB
GLUCOSE BLD STRIP.AUTO-MCNC: 135 MG/DL (ref 70–108)
GLUCOSE BLD STRIP.AUTO-MCNC: 149 MG/DL (ref 70–108)
GLUCOSE BLD STRIP.AUTO-MCNC: 150 MG/DL (ref 70–108)
GLUCOSE BLD STRIP.AUTO-MCNC: 227 MG/DL (ref 70–108)

## 2024-12-14 PROCEDURE — 2580000003 HC RX 258: Performed by: PHYSICAL MEDICINE & REHABILITATION

## 2024-12-14 PROCEDURE — 82948 REAGENT STRIP/BLOOD GLUCOSE: CPT

## 2024-12-14 PROCEDURE — 6370000000 HC RX 637 (ALT 250 FOR IP): Performed by: PHYSICAL MEDICINE & REHABILITATION

## 2024-12-14 PROCEDURE — 6370000000 HC RX 637 (ALT 250 FOR IP): Performed by: FAMILY MEDICINE

## 2024-12-14 PROCEDURE — 97530 THERAPEUTIC ACTIVITIES: CPT

## 2024-12-14 PROCEDURE — 97110 THERAPEUTIC EXERCISES: CPT

## 2024-12-14 PROCEDURE — 1180000000 HC REHAB R&B

## 2024-12-14 PROCEDURE — 97116 GAIT TRAINING THERAPY: CPT

## 2024-12-14 PROCEDURE — 6360000002 HC RX W HCPCS: Performed by: PHYSICAL MEDICINE & REHABILITATION

## 2024-12-14 RX ADMIN — DICLOFENAC SODIUM 2 G: 10 GEL TOPICAL at 09:15

## 2024-12-14 RX ADMIN — CALCIUM 500 MG: 500 TABLET ORAL at 17:05

## 2024-12-14 RX ADMIN — CYCLOBENZAPRINE 5 MG: 10 TABLET, FILM COATED ORAL at 21:25

## 2024-12-14 RX ADMIN — FAMOTIDINE 20 MG: 20 TABLET, FILM COATED ORAL at 21:26

## 2024-12-14 RX ADMIN — ENOXAPARIN SODIUM 30 MG: 100 INJECTION SUBCUTANEOUS at 21:26

## 2024-12-14 RX ADMIN — METFORMIN HYDROCHLORIDE 500 MG: 500 TABLET ORAL at 17:05

## 2024-12-14 RX ADMIN — DICLOFENAC SODIUM 2 G: 10 GEL TOPICAL at 21:30

## 2024-12-14 RX ADMIN — PYRIDOSTIGMINE BROMIDE 120 MG: 60 TABLET ORAL at 14:02

## 2024-12-14 RX ADMIN — ACETAMINOPHEN 650 MG: 325 TABLET ORAL at 21:26

## 2024-12-14 RX ADMIN — ENOXAPARIN SODIUM 30 MG: 100 INJECTION SUBCUTANEOUS at 09:15

## 2024-12-14 RX ADMIN — MYCOPHENOLATE MOFETIL 1000 MG: 250 CAPSULE ORAL at 09:16

## 2024-12-14 RX ADMIN — SODIUM CHLORIDE, PRESERVATIVE FREE 10 ML: 5 INJECTION INTRAVENOUS at 10:00

## 2024-12-14 RX ADMIN — MYCOPHENOLATE MOFETIL 1000 MG: 250 CAPSULE ORAL at 21:26

## 2024-12-14 RX ADMIN — DICLOFENAC SODIUM 2 G: 10 GEL TOPICAL at 14:03

## 2024-12-14 RX ADMIN — PYRIDOSTIGMINE BROMIDE 120 MG: 60 TABLET ORAL at 09:17

## 2024-12-14 RX ADMIN — METFORMIN HYDROCHLORIDE 500 MG: 500 TABLET ORAL at 09:16

## 2024-12-14 RX ADMIN — OXYCODONE HYDROCHLORIDE 5 MG: 5 TABLET ORAL at 12:17

## 2024-12-14 RX ADMIN — DOCUSATE SODIUM 100 MG: 100 CAPSULE, LIQUID FILLED ORAL at 21:26

## 2024-12-14 RX ADMIN — AMLODIPINE BESYLATE 5 MG: 5 TABLET ORAL at 09:16

## 2024-12-14 RX ADMIN — TAMSULOSIN HYDROCHLORIDE 0.4 MG: 0.4 CAPSULE ORAL at 09:16

## 2024-12-14 RX ADMIN — HEPARIN 500 UNITS: 100 SYRINGE at 10:00

## 2024-12-14 RX ADMIN — FLUOXETINE HYDROCHLORIDE 40 MG: 20 CAPSULE ORAL at 09:16

## 2024-12-14 RX ADMIN — ATORVASTATIN CALCIUM 40 MG: 40 TABLET, FILM COATED ORAL at 09:16

## 2024-12-14 RX ADMIN — DOCUSATE SODIUM 100 MG: 100 CAPSULE, LIQUID FILLED ORAL at 09:16

## 2024-12-14 RX ADMIN — FAMOTIDINE 20 MG: 20 TABLET, FILM COATED ORAL at 09:17

## 2024-12-14 RX ADMIN — DICLOFENAC SODIUM 2 G: 10 GEL TOPICAL at 17:05

## 2024-12-14 RX ADMIN — ROPINIROLE HYDROCHLORIDE 2 MG: 1 TABLET, FILM COATED ORAL at 21:25

## 2024-12-14 RX ADMIN — ACETAMINOPHEN 650 MG: 325 TABLET ORAL at 09:14

## 2024-12-14 RX ADMIN — ACETAMINOPHEN 650 MG: 325 TABLET ORAL at 14:02

## 2024-12-14 RX ADMIN — OXYCODONE HYDROCHLORIDE 2.5 MG: 5 TABLET ORAL at 21:25

## 2024-12-14 RX ADMIN — PYRIDOSTIGMINE BROMIDE 120 MG: 60 TABLET ORAL at 21:25

## 2024-12-14 RX ADMIN — Medication 1000 MCG: at 09:16

## 2024-12-14 RX ADMIN — Medication 6 MG: at 21:26

## 2024-12-14 RX ADMIN — CALCIUM 500 MG: 500 TABLET ORAL at 09:16

## 2024-12-14 RX ADMIN — SENNOSIDES 8.6 MG: 8.6 TABLET, FILM COATED ORAL at 21:26

## 2024-12-14 ASSESSMENT — PAIN SCALES - GENERAL
PAINLEVEL_OUTOF10: 2
PAINLEVEL_OUTOF10: 4
PAINLEVEL_OUTOF10: 4
PAINLEVEL_OUTOF10: 3
PAINLEVEL_OUTOF10: 7
PAINLEVEL_OUTOF10: 4
PAINLEVEL_OUTOF10: 6
PAINLEVEL_OUTOF10: 3

## 2024-12-14 ASSESSMENT — PAIN DESCRIPTION - ORIENTATION
ORIENTATION: RIGHT
ORIENTATION: RIGHT;LEFT

## 2024-12-14 ASSESSMENT — PAIN DESCRIPTION - LOCATION
LOCATION: HIP
LOCATION: HIP
LOCATION: SHOULDER;RIB CAGE
LOCATION: LEG;HIP
LOCATION: HIP
LOCATION: HIP

## 2024-12-14 ASSESSMENT — PAIN SCALES - WONG BAKER
WONGBAKER_NUMERICALRESPONSE: HURTS A LITTLE BIT
WONGBAKER_NUMERICALRESPONSE: NO HURT

## 2024-12-14 ASSESSMENT — PAIN DESCRIPTION - DESCRIPTORS
DESCRIPTORS: ACHING;DISCOMFORT
DESCRIPTORS: ACHING
DESCRIPTORS: ACHING
DESCRIPTORS: ACHING;DISCOMFORT

## 2024-12-14 NOTE — PLAN OF CARE
Problem: Discharge Planning  Goal: Discharge to home or other facility with appropriate resources  12/14/2024 0100 by Derrell Jefferson, RN  Outcome: Progressing  Flowsheets (Taken 12/13/2024 2145)  Discharge to home or other facility with appropriate resources: Identify barriers to discharge with patient and caregiver     Problem: ABCDS Injury Assessment  Goal: Absence of physical injury  12/14/2024 0100 by Derrell Jefferson, RN  Outcome: Progressing     Problem: Safety - Adult  Goal: Free from fall injury  12/14/2024 0100 by Derrell Jefferson, RN  Outcome: Progressing     Problem: Pain  Goal: Verbalizes/displays adequate comfort level or baseline comfort level  12/14/2024 0100 by Derrell Jefferson RN  Outcome: Progressing     Problem: Chronic Conditions and Co-morbidities  Goal: Patient's chronic conditions and co-morbidity symptoms are monitored and maintained or improved  12/14/2024 0100 by Derrell Jefferson, RN  Outcome: Progressing  Flowsheets (Taken 12/13/2024 2145)  Care Plan - Patient's Chronic Conditions and Co-Morbidity Symptoms are Monitored and Maintained or Improved: Monitor and assess patient's chronic conditions and comorbid symptoms for stability, deterioration, or improvement

## 2024-12-14 NOTE — PLAN OF CARE
Problem: Chronic Conditions and Co-morbidities  Goal: Patient's chronic conditions and co-morbidity symptoms are monitored and maintained or improved  12/14/2024 1043 by Kelsey Carrasquillo LPN  Outcome: Progressing  Flowsheets (Taken 12/14/2024 1043)  Care Plan - Patient's Chronic Conditions and Co-Morbidity Symptoms are Monitored and Maintained or Improved:   Monitor and assess patient's chronic conditions and comorbid symptoms for stability, deterioration, or improvement   Collaborate with multidisciplinary team to address chronic and comorbid conditions and prevent exacerbation or deterioration   Update acute care plan with appropriate goals if chronic or comorbid symptoms are exacerbated and prevent overall improvement and discharge  12/14/2024 0100 by Derrell Jefferson, RN  Outcome: Progressing  Flowsheets (Taken 12/13/2024 2145)  Care Plan - Patient's Chronic Conditions and Co-Morbidity Symptoms are Monitored and Maintained or Improved: Monitor and assess patient's chronic conditions and comorbid symptoms for stability, deterioration, or improvement     Problem: Discharge Planning  Goal: Discharge to home or other facility with appropriate resources  12/14/2024 1043 by Kelsey Carrasquillo LPN  Outcome: Progressing  Flowsheets (Taken 12/14/2024 1043)  Discharge to home or other facility with appropriate resources:   Identify barriers to discharge with patient and caregiver   Identify discharge learning needs (meds, wound care, etc)   Refer to discharge planning if patient needs post-hospital services based on physician order or complex needs related to functional status, cognitive ability or social support system   Arrange for needed discharge resources and transportation as appropriate  Note: Discharge 12/21/24 12/14/2024 0100 by Derrell Jefferson, RN  Outcome: Progressing  Flowsheets (Taken 12/13/2024 2145)  Discharge to home or other facility with appropriate resources: Identify barriers to

## 2024-12-14 NOTE — PROGRESS NOTES
Patient: Duane Scheiderer  Unit/Bed: 8K-05/005-A  YOB: 1950  MRN: [id number] Acct: [id number]   Admitting Diagnosis: Periprosthetic fracture of shaft of femur [M97.8XXA, Z96.649]  Admit Date:  12/10/2024  Hospital Day: 3    Assessment:     Principal Problem:    Periprosthetic fracture of shaft of femur  Active Problems:    Controlled type 2 diabetes mellitus with complication, with long-term current use of insulin (HCC)    S/P total right hip arthroplasty    Chest wall contusion, right, initial encounter    Myasthenia gravis ([facility])    Essential hypertension    Obstructive sleep apnea syndrome    H/O thymoma    Benign prostatic hyperplasia    Class 1 obesity due to excess calories without serious comorbidity with body mass index (BMI) of 30.0 to 30.9 in adult  Resolved Problems:    * No resolved hospital problems. *      Plan:     We discussed the CS being acceptable with the insulin pump.        Subjective:     Patient has no complaint of CP or SOB..   Medication side effects: none    Scheduled Meds:   heparin (PF)  500 Units IntraCATHeter Daily    [START ON 12/14/2024] sodium chloride flush  5-40 mL IntraVENous Daily    pyRIDostigmine  120 mg Oral TID    vitamin B-12  1,000 mcg Oral Daily    Insulin Pump - Basal Dose   SubCUTAneous Daily    enoxaparin  30 mg SubCUTAneous BID    amLODIPine  5 mg Oral Daily    atorvastatin  40 mg Oral Daily    famotidine  20 mg Oral BID    finasteride  5 mg Oral Daily    FLUoxetine  40 mg Oral Daily    mycophenolate  1,000 mg Oral BID    rOPINIRole  2 mg Oral Nightly    tamsulosin  0.4 mg Oral Daily    Insulin Pump - Bolus Dose   SubCUTAneous 4x Daily AC & HS    docusate sodium  100 mg Oral BID    acetaminophen  650 mg Oral Q6H    senna  1 tablet Oral Nightly    melatonin  6 mg Oral Nightly    metFORMIN  500 mg Oral BID    [START ON 12/17/2024] vitamin D  50,000 Units Oral Weekly    diclofenac sodium  2 g Topical 4x daily    calcium elemental  500 mg Oral BID WC

## 2024-12-14 NOTE — CARE COORDINATION
Patient participated in all therapies today, up to chair or wheelchair most of day. Patient up with walked 1 assist. Discharge on 12/21/2024 to home with wife. Rip hip incision open to air reddened in some areas, picture taken and uploaded to avatar. Port in right side of chest, flushed and heparin given by RN. Patient had 1 dose of oxycodone this shift. Wife at chair side most of day, no further concerns at this time.

## 2024-12-14 NOTE — PROGRESS NOTES
[facility]  Occupational Therapy  Daily Note    Discharge Recommendations: Continue to assess pending progress and Patient would benefit from continued OT at discharge  Equipment Recommendations: Yes (shower chair)        Time In: 1030  Time Out: 1200  Timed Code Treatment Minutes: 90 Minutes  Minutes: 90          Date: 2024  Patient Name: Duane Scheiderer,   Gender: male      Room: [address] [place] [address]  MRN: [id number]  : 1950  (74 y.o.)  Referring Practitioner: Xander Phan MD  Diagnosis: Periprosthetic fracture of shaft of femur  Additional Pertinent Hx: 74 y.o. 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 2024: right total hip arthroplasty. His function was improving well postoperatively.  He began using straight cane to assist walking about 2 weeks ago. On 2024, the patient suddenly developed severe pain at the right hip radiating to right thigh to knee level when he tried to stand up to walk.  He then began having increasing difficulty to put weight on his right lower extremity to walk. On 2024 morning, while the patient went to bathroom to have bowel movement, fell forward to the ground landing on his knees with his upper body lean toward his  Modified Barthel Index.  Long Term Goal 2: Pt will complete simple IADL tasks with supervision assist to faciliate tasks within home/community environments.  Long Term Goal 3: Pt will complete community re-entry task with no more than supervision assist.    Following session, patient left in safe position with all fall risk precautions in place.

## 2024-12-14 NOTE — PROGRESS NOTES
ft + 20 ft  Surface: Level Tile  Device: Rolling Walker  Gait Deviations: Forward Flexed Posture, Decreased Step Length Bilaterally, Decreased Foot Clearance Left, Narrow Base of Support, Moderate Path Deviations, Increased reliance on assistive device, Verbal and tactile cues for safety and sequencing required during gait training in order for a more normalized gait pattern and fall preventions. Without verbal and tactile cues, patient would require more physical assistance in order to complete task, Cues for proximity to assistive device, and cues for hip at midline and TTWB     Wheelchair Mobility:  Modified Independent  Extremities Used: Bilateral Upper Extremities  Type of Chair:Manual  Surface: Level Tile  Distance: 100 ft + 175 ft   Quality: Decreased Fluidity and Good Velocity      Stairs:  Stairs: 6\" steps X 1 set forward and 1 set backwards using Bilateral Handrails and Independent, Contact Guard Assistance, Minimal Assistance, X 1, with cues for safety, with increased time for completion, cues for proper sequence.    Platform: 6\" platform X 1 using Bilateral Handrails and Minimal Assistance, X 1, with cues for safety, with increased time for completion, cues for sequence.    Balance:  Static Sitting Balance:  Supervision  Dynamic Sitting Balance: Stand By Assistance  Static Standing Balance: Stand By Assistance, Contact Guard Assistance  Dynamic Standing Balance: Contact Guard Assistance, Minimal Assistance    Dynamic balance exercises: Step over hurdles in //bars, ring toss, pulling minimal resistance, standing on airex foam.    Exercise:  Patient was guided in 1 set(s) 20 reps of exercises to both lower extremities: Ankle pumps, Glut sets, Seated marches, and Long arc quads.  Exercises were completed for increased independence with functional mobility.  -Cues for less than 90 degrees for Rt hip flexion    Functional Outcome Measures:  Not completed  Modified Jorge Luis Scale:  Not  safety with transfers in home.  Long Term Goal 2: Patient to ambulate >/=75 feet with use of RW while maintaining TTWB on RLE in order to assist with home mobility.  Long Term Goal 3: Patient to ascend/descend 4 steps with 1 HR and Supervision in order to assist with home entry.  Long Term Goal 4: Patient to perform car transfers with Supervision in order to assist with getting to and from appointments.    Following session, patient left in safe position with all fall risk precautions in place.

## 2024-12-15 LAB
GLUCOSE BLD STRIP.AUTO-MCNC: 106 MG/DL (ref 70–108)
GLUCOSE BLD STRIP.AUTO-MCNC: 154 MG/DL (ref 70–108)
GLUCOSE BLD STRIP.AUTO-MCNC: 156 MG/DL (ref 70–108)
GLUCOSE BLD STRIP.AUTO-MCNC: 217 MG/DL (ref 70–108)

## 2024-12-15 PROCEDURE — 6360000002 HC RX W HCPCS: Performed by: PHYSICAL MEDICINE & REHABILITATION

## 2024-12-15 PROCEDURE — 6370000000 HC RX 637 (ALT 250 FOR IP): Performed by: FAMILY MEDICINE

## 2024-12-15 PROCEDURE — 1180000000 HC REHAB R&B

## 2024-12-15 PROCEDURE — 2580000003 HC RX 258: Performed by: PHYSICAL MEDICINE & REHABILITATION

## 2024-12-15 PROCEDURE — 6370000000 HC RX 637 (ALT 250 FOR IP): Performed by: PHYSICAL MEDICINE & REHABILITATION

## 2024-12-15 PROCEDURE — 82948 REAGENT STRIP/BLOOD GLUCOSE: CPT

## 2024-12-15 RX ADMIN — ROPINIROLE HYDROCHLORIDE 2 MG: 1 TABLET, FILM COATED ORAL at 21:00

## 2024-12-15 RX ADMIN — PYRIDOSTIGMINE BROMIDE 120 MG: 60 TABLET ORAL at 14:39

## 2024-12-15 RX ADMIN — PYRIDOSTIGMINE BROMIDE 120 MG: 60 TABLET ORAL at 21:00

## 2024-12-15 RX ADMIN — FINASTERIDE 5 MG: 5 TABLET, FILM COATED ORAL at 09:47

## 2024-12-15 RX ADMIN — SODIUM CHLORIDE, PRESERVATIVE FREE 10 ML: 5 INJECTION INTRAVENOUS at 11:40

## 2024-12-15 RX ADMIN — Medication 6 MG: at 21:00

## 2024-12-15 RX ADMIN — MYCOPHENOLATE MOFETIL 1000 MG: 250 CAPSULE ORAL at 09:59

## 2024-12-15 RX ADMIN — FAMOTIDINE 20 MG: 20 TABLET, FILM COATED ORAL at 09:45

## 2024-12-15 RX ADMIN — SENNOSIDES 8.6 MG: 8.6 TABLET, FILM COATED ORAL at 21:00

## 2024-12-15 RX ADMIN — METFORMIN HYDROCHLORIDE 500 MG: 500 TABLET ORAL at 09:47

## 2024-12-15 RX ADMIN — Medication 1000 MCG: at 09:46

## 2024-12-15 RX ADMIN — METFORMIN HYDROCHLORIDE 500 MG: 500 TABLET ORAL at 16:59

## 2024-12-15 RX ADMIN — ENOXAPARIN SODIUM 30 MG: 100 INJECTION SUBCUTANEOUS at 21:02

## 2024-12-15 RX ADMIN — DICLOFENAC SODIUM 2 G: 10 GEL TOPICAL at 09:50

## 2024-12-15 RX ADMIN — ACETAMINOPHEN 650 MG: 325 TABLET ORAL at 14:38

## 2024-12-15 RX ADMIN — DOCUSATE SODIUM 100 MG: 100 CAPSULE, LIQUID FILLED ORAL at 21:01

## 2024-12-15 RX ADMIN — HEPARIN 500 UNITS: 100 SYRINGE at 11:38

## 2024-12-15 RX ADMIN — DOCUSATE SODIUM 100 MG: 100 CAPSULE, LIQUID FILLED ORAL at 09:45

## 2024-12-15 RX ADMIN — DICLOFENAC SODIUM 2 G: 10 GEL TOPICAL at 17:00

## 2024-12-15 RX ADMIN — ACETAMINOPHEN 650 MG: 325 TABLET ORAL at 21:00

## 2024-12-15 RX ADMIN — DICLOFENAC SODIUM 2 G: 10 GEL TOPICAL at 14:38

## 2024-12-15 RX ADMIN — MYCOPHENOLATE MOFETIL 1000 MG: 250 CAPSULE ORAL at 21:00

## 2024-12-15 RX ADMIN — ATORVASTATIN CALCIUM 40 MG: 40 TABLET, FILM COATED ORAL at 09:48

## 2024-12-15 RX ADMIN — PYRIDOSTIGMINE BROMIDE 120 MG: 60 TABLET ORAL at 09:46

## 2024-12-15 RX ADMIN — FLUOXETINE HYDROCHLORIDE 40 MG: 20 CAPSULE ORAL at 09:46

## 2024-12-15 RX ADMIN — OXYCODONE HYDROCHLORIDE 5 MG: 5 TABLET ORAL at 22:14

## 2024-12-15 RX ADMIN — FAMOTIDINE 20 MG: 20 TABLET, FILM COATED ORAL at 21:00

## 2024-12-15 RX ADMIN — CALCIUM 500 MG: 500 TABLET ORAL at 09:47

## 2024-12-15 RX ADMIN — DICLOFENAC SODIUM 2 G: 10 GEL TOPICAL at 21:01

## 2024-12-15 RX ADMIN — CALCIUM 500 MG: 500 TABLET ORAL at 16:59

## 2024-12-15 RX ADMIN — TAMSULOSIN HYDROCHLORIDE 0.4 MG: 0.4 CAPSULE ORAL at 09:47

## 2024-12-15 RX ADMIN — ENOXAPARIN SODIUM 30 MG: 100 INJECTION SUBCUTANEOUS at 09:46

## 2024-12-15 RX ADMIN — AMLODIPINE BESYLATE 5 MG: 5 TABLET ORAL at 09:56

## 2024-12-15 RX ADMIN — ACETAMINOPHEN 650 MG: 325 TABLET ORAL at 09:45

## 2024-12-15 ASSESSMENT — PAIN - FUNCTIONAL ASSESSMENT
PAIN_FUNCTIONAL_ASSESSMENT: ACTIVITIES ARE NOT PREVENTED
PAIN_FUNCTIONAL_ASSESSMENT: ACTIVITIES ARE NOT PREVENTED

## 2024-12-15 ASSESSMENT — PAIN SCALES - GENERAL
PAINLEVEL_OUTOF10: 7
PAINLEVEL_OUTOF10: 4
PAINLEVEL_OUTOF10: 5
PAINLEVEL_OUTOF10: 3

## 2024-12-15 ASSESSMENT — PAIN DESCRIPTION - ORIENTATION
ORIENTATION: RIGHT
ORIENTATION: RIGHT

## 2024-12-15 ASSESSMENT — PAIN DESCRIPTION - DESCRIPTORS
DESCRIPTORS: ACHING
DESCRIPTORS: ACHING

## 2024-12-15 ASSESSMENT — PAIN DESCRIPTION - LOCATION: LOCATION: HIP

## 2024-12-15 NOTE — CARE COORDINATION
Patient educated on how to use incentive spirometer. Patient verbalized understanding and demonstrated proper use. Emphasized importance and usage of device, with coughing and deep breathing every 2 hours while awake. Patient pleasant and cooperative.

## 2024-12-15 NOTE — CARE COORDINATION
Patient is oriented, calm and coordinated well with NOD. With a Mediport that is accessed. With continues glucose monitor attached to his left upper arm. Pt also has patient supplied insulin pump. V/S within normal limits. No other concern at this moment. Electronically signed by Derrell Jefferson RN on 12/15/2024 at 4:18 AM

## 2024-12-15 NOTE — PLAN OF CARE
Problem: Chronic Conditions and Co-morbidities  Goal: Patient's chronic conditions and co-morbidity symptoms are monitored and maintained or improved  Outcome: Progressing  Flowsheets (Taken 12/14/2024 2015 by Derrell Jefferson, RN)  Care Plan - Patient's Chronic Conditions and Co-Morbidity Symptoms are Monitored and Maintained or Improved: Monitor and assess patient's chronic conditions and comorbid symptoms for stability, deterioration, or improvement     Problem: Discharge Planning  Goal: Discharge to home or other facility with appropriate resources  Outcome: Progressing  Flowsheets (Taken 12/14/2024 2015 by Derrell Jefferson, RN)  Discharge to home or other facility with appropriate resources: Identify barriers to discharge with patient and caregiver     Problem: ABCDS Injury Assessment  Goal: Absence of physical injury  Outcome: Progressing  Flowsheets (Taken 12/14/2024 1043 by Kelsey Carrasquillo LPN)  Absence of Physical Injury: Implement safety measures based on patient assessment     Problem: Safety - Adult  Goal: Free from fall injury  Outcome: Progressing  Flowsheets (Taken 12/14/2024 1043 by Kelsey Carrasquillo LPN)  Free From Fall Injury: Instruct family/caregiver on patient safety     Problem: Pain  Goal: Verbalizes/displays adequate comfort level or baseline comfort level  Outcome: Progressing  Flowsheets (Taken 12/14/2024 2015 by Derrell Jefferson, RN)  Verbalizes/displays adequate comfort level or baseline comfort level: Encourage patient to monitor pain and request assistance

## 2024-12-15 NOTE — PLAN OF CARE
Problem: Discharge Planning  Goal: Discharge to home or other facility with appropriate resources  12/14/2024 2149 by Derrell Jefferson, RN  Outcome: Progressing  Flowsheets (Taken 12/14/2024 2015)  Discharge to home or other facility with appropriate resources: Identify barriers to discharge with patient and caregiver     Problem: ABCDS Injury Assessment  Goal: Absence of physical injury  12/14/2024 2149 by Derrell Jefferson, RN  Outcome: Progressing     Problem: Safety - Adult  Goal: Free from fall injury  12/14/2024 2149 by Derrell Jefferson, RN  Outcome: Progressing     Problem: Pain  Goal: Verbalizes/displays adequate comfort level or baseline comfort level  12/14/2024 2149 by Derrell Jefferson, RN  Outcome: Progressing  Flowsheets (Taken 12/14/2024 2015)  Verbalizes/displays adequate comfort level or baseline comfort level: Encourage patient to monitor pain and request assistance     Problem: Chronic Conditions and Co-morbidities  Goal: Patient's chronic conditions and co-morbidity symptoms are monitored and maintained or improved  12/14/2024 2149 by Derrell Jefferson, RN  Outcome: Progressing  Flowsheets (Taken 12/14/2024 2015)  Care Plan - Patient's Chronic Conditions and Co-Morbidity Symptoms are Monitored and Maintained or Improved: Monitor and assess patient's chronic conditions and comorbid symptoms for stability, deterioration, or improvement

## 2024-12-15 NOTE — PROGRESS NOTES
Physical Medicine & Rehabilitation Progress Note    Chief Complaint:  Right femur periprosthetic fracture second to fall     Subjective:    Duane Scheiderer is a 74 y.o.  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     The patient says he developed gradual onset right hip and groin pain starting in late 2022 to early 2023.  The painful symptom progressively worsened and occasionally radiating down to his right lower extremity foot area.  He says the pain was particularly worse when he weightbearing and walk on his right lower extremity.  An x-ray of right hip done at [facility] on 12/7/2023 was reported to show mild osteoarthritic change of right hip.  Because of worsening symptoms he had right hip MRI done at the [facility] on 8/29/2024 and revealed severe right hip osteoarthritis with full-thickness chondral defect, moderate joint effusion, and degenerative signal and fraying of the acetabular labrum with paralabral cyst.  Therefore right hip replacement surgery was recommended.  The patient then was admitted to [facility] on 11/12/2024 and underwent right total hip  decreasing.  He continues tolerating the rehab treatment well.  His function continue to improve slowly.    The patient currently is projected to be ready for discharge on 12/21/2024.      Plan:  Continues intensive PT/OT/RT inpatient rehabilitation program at least 3 hours per day, 5 days per week in order to improve functional status prior to discharge.  Family education and training will be completed.  Equipment evaluations and recommendations will be completed as appropriate.       Rehabilitation nursing continue to be involved for bowel, bladder, skin, and pain management.  Nursing will also provide education and training to patient and family.    Prophylaxis:  DVT: Lovenox, ARRON stocking, intermittent pneumatic compression device.  GI: Colace, Senokot, GlycoLax as needed, milk of magnesium as needed, Dulcolax suppository as needed, Zofran as needed  Pain: Tylenol, Tylenol as needed, Flexeril as needed, oxycodone 2.5 to 5 mg as needed  Continue Voltaren gel application to right lateral lower chest/upper abdominal wall painful area  Continue Norvasc for hypertension  Continue CellCept for thymoma  Continue pyridostigmine for myasthenia gravis  Continues Requip for (?) restless leg syndrome  Continue Lipitor for hyperlipidemia  Continue Pepcid for gastric protection  Continue insulin pump for diabetes mellitus; restart home metformin usage  Continue Proscar, Flomax for benign prostate hypertrophy  Continue Prozac for (?) depression  Continue melatonin, trazodone as needed for insomnia  Nutrition: Continue 60 g per meal carbohydrate regular diet.   Bladder: Monitoring signs or symptoms of UTI  Bowel: Monitoring signs or symptoms of constipation   and case management for coordination of care and discharge planning      Missed Therapy Time:  None      KRISTIN BEAVER MD     This report has been created using voice recognition software. It may contain minor errors which are inherent in voice

## 2024-12-16 LAB — GLUCOSE BLD STRIP.AUTO-MCNC: 125 MG/DL (ref 70–108)

## 2024-12-16 PROCEDURE — 97530 THERAPEUTIC ACTIVITIES: CPT

## 2024-12-16 PROCEDURE — 99232 SBSQ HOSP IP/OBS MODERATE 35: CPT | Performed by: PHYSICAL MEDICINE & REHABILITATION

## 2024-12-16 PROCEDURE — 97116 GAIT TRAINING THERAPY: CPT

## 2024-12-16 PROCEDURE — 6370000000 HC RX 637 (ALT 250 FOR IP): Performed by: FAMILY MEDICINE

## 2024-12-16 PROCEDURE — 97110 THERAPEUTIC EXERCISES: CPT

## 2024-12-16 PROCEDURE — 6370000000 HC RX 637 (ALT 250 FOR IP): Performed by: PHYSICAL MEDICINE & REHABILITATION

## 2024-12-16 PROCEDURE — 82948 REAGENT STRIP/BLOOD GLUCOSE: CPT

## 2024-12-16 PROCEDURE — 97112 NEUROMUSCULAR REEDUCATION: CPT

## 2024-12-16 PROCEDURE — 1180000000 HC REHAB R&B

## 2024-12-16 PROCEDURE — 97535 SELF CARE MNGMENT TRAINING: CPT

## 2024-12-16 PROCEDURE — 6360000002 HC RX W HCPCS: Performed by: PHYSICAL MEDICINE & REHABILITATION

## 2024-12-16 RX ADMIN — FAMOTIDINE 20 MG: 20 TABLET, FILM COATED ORAL at 08:05

## 2024-12-16 RX ADMIN — DICLOFENAC SODIUM 2 G: 10 GEL TOPICAL at 14:54

## 2024-12-16 RX ADMIN — FLUOXETINE HYDROCHLORIDE 40 MG: 20 CAPSULE ORAL at 08:04

## 2024-12-16 RX ADMIN — CYCLOBENZAPRINE 5 MG: 10 TABLET, FILM COATED ORAL at 22:06

## 2024-12-16 RX ADMIN — SENNOSIDES 8.6 MG: 8.6 TABLET, FILM COATED ORAL at 21:03

## 2024-12-16 RX ADMIN — ROPINIROLE HYDROCHLORIDE 2 MG: 1 TABLET, FILM COATED ORAL at 22:07

## 2024-12-16 RX ADMIN — DOCUSATE SODIUM 100 MG: 100 CAPSULE, LIQUID FILLED ORAL at 08:04

## 2024-12-16 RX ADMIN — ACETAMINOPHEN 650 MG: 325 TABLET ORAL at 21:02

## 2024-12-16 RX ADMIN — Medication 6 MG: at 21:03

## 2024-12-16 RX ADMIN — PYRIDOSTIGMINE BROMIDE 120 MG: 60 TABLET ORAL at 08:04

## 2024-12-16 RX ADMIN — PYRIDOSTIGMINE BROMIDE 120 MG: 60 TABLET ORAL at 14:54

## 2024-12-16 RX ADMIN — OXYCODONE HYDROCHLORIDE 5 MG: 5 TABLET ORAL at 08:05

## 2024-12-16 RX ADMIN — MYCOPHENOLATE MOFETIL 1000 MG: 250 CAPSULE ORAL at 22:08

## 2024-12-16 RX ADMIN — ENOXAPARIN SODIUM 30 MG: 100 INJECTION SUBCUTANEOUS at 21:03

## 2024-12-16 RX ADMIN — ACETAMINOPHEN 650 MG: 325 TABLET ORAL at 08:04

## 2024-12-16 RX ADMIN — DICLOFENAC SODIUM 2 G: 10 GEL TOPICAL at 08:06

## 2024-12-16 RX ADMIN — DICLOFENAC SODIUM 2 G: 10 GEL TOPICAL at 21:05

## 2024-12-16 RX ADMIN — METFORMIN HYDROCHLORIDE 500 MG: 500 TABLET ORAL at 14:54

## 2024-12-16 RX ADMIN — FAMOTIDINE 20 MG: 20 TABLET, FILM COATED ORAL at 21:03

## 2024-12-16 RX ADMIN — OXYCODONE HYDROCHLORIDE 5 MG: 5 TABLET ORAL at 23:19

## 2024-12-16 RX ADMIN — METFORMIN HYDROCHLORIDE 500 MG: 500 TABLET ORAL at 08:04

## 2024-12-16 RX ADMIN — Medication 1000 MCG: at 08:04

## 2024-12-16 RX ADMIN — PYRIDOSTIGMINE BROMIDE 120 MG: 60 TABLET ORAL at 21:30

## 2024-12-16 RX ADMIN — CALCIUM 500 MG: 500 TABLET ORAL at 08:04

## 2024-12-16 RX ADMIN — ENOXAPARIN SODIUM 30 MG: 100 INJECTION SUBCUTANEOUS at 08:03

## 2024-12-16 RX ADMIN — ACETAMINOPHEN 650 MG: 325 TABLET ORAL at 14:53

## 2024-12-16 RX ADMIN — CALCIUM 500 MG: 500 TABLET ORAL at 18:21

## 2024-12-16 RX ADMIN — OXYCODONE HYDROCHLORIDE 5 MG: 5 TABLET ORAL at 14:56

## 2024-12-16 RX ADMIN — CYCLOBENZAPRINE 5 MG: 10 TABLET, FILM COATED ORAL at 08:05

## 2024-12-16 RX ADMIN — FINASTERIDE 5 MG: 5 TABLET, FILM COATED ORAL at 08:04

## 2024-12-16 RX ADMIN — DOCUSATE SODIUM 100 MG: 100 CAPSULE, LIQUID FILLED ORAL at 21:04

## 2024-12-16 RX ADMIN — MYCOPHENOLATE MOFETIL 1000 MG: 250 CAPSULE ORAL at 08:03

## 2024-12-16 RX ADMIN — TAMSULOSIN HYDROCHLORIDE 0.4 MG: 0.4 CAPSULE ORAL at 08:04

## 2024-12-16 RX ADMIN — ATORVASTATIN CALCIUM 40 MG: 40 TABLET, FILM COATED ORAL at 08:04

## 2024-12-16 RX ADMIN — AMLODIPINE BESYLATE 5 MG: 5 TABLET ORAL at 08:04

## 2024-12-16 ASSESSMENT — ENCOUNTER SYMPTOMS
ABDOMINAL PAIN: 1
DIARRHEA: 0
BACK PAIN: 0
NAUSEA: 0
SORE THROAT: 0
COUGH: 0
SHORTNESS OF BREATH: 0
TROUBLE SWALLOWING: 0
WHEEZING: 0
VOMITING: 0
CONSTIPATION: 0
RHINORRHEA: 0

## 2024-12-16 ASSESSMENT — PAIN SCALES - GENERAL
PAINLEVEL_OUTOF10: 3
PAINLEVEL_OUTOF10: 7
PAINLEVEL_OUTOF10: 3

## 2024-12-16 ASSESSMENT — PAIN - FUNCTIONAL ASSESSMENT
PAIN_FUNCTIONAL_ASSESSMENT: PREVENTS OR INTERFERES SOME ACTIVE ACTIVITIES AND ADLS
PAIN_FUNCTIONAL_ASSESSMENT: ACTIVITIES ARE NOT PREVENTED
PAIN_FUNCTIONAL_ASSESSMENT: ACTIVITIES ARE NOT PREVENTED

## 2024-12-16 ASSESSMENT — PAIN DESCRIPTION - PAIN TYPE
TYPE: SURGICAL PAIN
TYPE: CHRONIC PAIN;SURGICAL PAIN

## 2024-12-16 ASSESSMENT — PAIN DESCRIPTION - ORIENTATION
ORIENTATION: RIGHT;OUTER;UPPER
ORIENTATION: RIGHT
ORIENTATION: RIGHT

## 2024-12-16 ASSESSMENT — PAIN DESCRIPTION - LOCATION
LOCATION: HIP

## 2024-12-16 ASSESSMENT — PAIN DESCRIPTION - DESCRIPTORS
DESCRIPTORS: ACHING
DESCRIPTORS: DULL;SHARP
DESCRIPTORS: ACHING

## 2024-12-16 ASSESSMENT — PAIN DESCRIPTION - ONSET: ONSET: ON-GOING

## 2024-12-16 ASSESSMENT — PAIN DESCRIPTION - FREQUENCY: FREQUENCY: INTERMITTENT

## 2024-12-16 NOTE — PLAN OF CARE
Problem: Safety - Adult  Goal: Free from fall injury  Flowsheets (Taken 12/16/2024 0655)  Free From Fall Injury: Instruct family/caregiver on patient safety     Problem: Pain  Goal: Verbalizes/displays adequate comfort level or baseline comfort level  Flowsheets (Taken 12/16/2024 0655)  Verbalizes/displays adequate comfort level or baseline comfort level:   Encourage patient to monitor pain and request assistance   Assess pain using appropriate pain scale   Implement non-pharmacological measures as appropriate and evaluate response     Problem: ABCDS Injury Assessment  Goal: Absence of physical injury  Flowsheets (Taken 12/14/2024 1043 by Kelsey Carrasquillo, LPN)  Absence of Physical Injury: Implement safety measures based on patient assessment

## 2024-12-16 NOTE — PROGRESS NOTES
increased time for completion  Distance: 57 ft. x1, 158 ft. x1, 5 ft. x2, 10 ft. x1  Surface: Level Tile  Device: Rolling Walker  Gait Deviations: Forward Flexed Posture, Decreased Step Length on Right, Decreased Step Length Bilaterally, Decreased Weight Shift Right, Decreased Gait Speed, Decreased Heel Strike on Right, Decreased Foot Clearance Right, Mild Path Deviations, Increased reliance on assistive device, Verbal and tactile cues for safety and sequencing required during gait training in order for a more normalized gait pattern and fall preventions. Without verbal and tactile cues, patient would require more physical assistance in order to complete task, and Cues for proximity to assistive device     Wheelchair Mobility:  Modified Independent  Extremities Used: Bilateral Upper Extremities  Type of Chair:Manual  Surface: Level Tile and Ramp  Distance: 100 ft. x1, 175 ft. x1  Quality: Decreased Fluidity and Good Velocity     Stairs:  Stairs: 6\" steps X 4 using Bilateral Handrails and Contact Guard Assistance, with cues for safety, with verbal cues , with increased time for completion.  Platform: 4\" platform X 2 using parallel bars and RW and Contact Guard Assistance, with verbal cues , with increased time for completion. 6\" platform X 2 using parallel bars and RW and Contact Guard Assistance, with verbal cues , with increased time for completion.    Balance:  Static Standing Balance: Stand By Assistance  Dynamic Standing Balance: Stand By Assistance  Single Leg Balance: Stand By Assistance, Contact Guard Assistance    *Dynamic balance exercises completed: ring toss with VC's for maintained WB'ing status requiring Contact Guard Assistance to Minimal Assistance.    Exercise:  Patient was guided in 1 set(s) 15 reps of exercises to both lower extremities: Ankle pumps, Quad sets, Heelslides, Short arc quads, Hip abduction (right side only), Seated heel/toe raises, Long arc quads, Seated isometric hip adduction,

## 2024-12-16 NOTE — PROGRESS NOTES
[facility]  Occupational Therapy  Daily Note    Discharge Recommendations: Home with assist as needed  Equipment Recommendations: Yes (shower chair)        Time In: 1354  Time Out: 1454  Timed Code Treatment Minutes: 60 Minutes  Minutes: 60          Date: 2024  Patient Name: Duane Scheiderer,   Gender: male      Room: [address] [place] [address]  MRN: [id number]  : 1950  (74 y.o.)  Referring Practitioner: Xander Phan MD  Diagnosis: Periprosthetic fracture of shaft of femur  Additional Pertinent Hx: 74 y.o. 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 2024: right total hip arthroplasty. His function was improving well postoperatively.  He began using straight cane to assist walking about 2 weeks ago. On 2024, the patient suddenly developed severe pain at the right hip radiating to right thigh to knee level when he tried to stand up to walk.  He then began having increasing difficulty to put weight on his right lower extremity to walk. On 2024 morning, while the patient went to bathroom to have bowel movement, fell forward to the ground landing on his knees with his upper body lean toward his right side over the bathtub.  His wife says the patient was

## 2024-12-16 NOTE — PROGRESS NOTES
[facility]  Occupational Therapy  Daily Note    Discharge Recommendations: Patient would benefit from continued OT at discharge  Equipment Recommendations: Yes (shower chair)        Time In: 1324  Time Out: 1354  Timed Code Treatment Minutes: 30 Minutes  Minutes: 30          Date: 2024  Patient Name: Duane Scheiderer,   Gender: male      Room: [address] [place] [address]  MRN: [id number]  : 1950  (74 y.o.)  Referring Practitioner: Xander Phan MD  Diagnosis: Periprosthetic fracture of shaft of femur  Additional Pertinent Hx: 74 y.o. 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 2024: right total hip arthroplasty. His function was improving well postoperatively.  He began using straight cane to assist walking about 2 weeks ago. On 2024, the patient suddenly developed severe pain at the right hip radiating to right thigh to knee level when he tried to stand up to walk.  He then began having increasing difficulty to put weight on his right lower extremity to walk. On 2024 morning, while the patient went to bathroom to have bowel movement, fell forward to the ground landing on his knees with his upper body lean toward his right side over the bathtub.  His wife says  Assist for Ambulation: Independent household ambulator, with or without device, Independent community ambulator, with or without device  Has the patient had two or more falls in the past year or any fall with injury in the past year?: No (only one fall per patient)    Active : Yes  Patient's  Info: not driving at this time  Mode of Transportation: Van  Occupation: Retired  Type of Occupation: Trunk   Leisure & Hobbies: walking  Additional Comments: Pt reports he was IND with use of a cane, rollator occasionally. Pt had THR on the right on Nov 12. Pt went home with  PT, OT, and RN and was still current with those services at time of admit.    SUBJECTIVE:Pt. In room and agreeable to participate.  Pleasant and cooperative throughout.     PAIN: 5-6/10: R hip    Vitals: Vitals not assessed per clinical judgement, see nursing flowsheet    COGNITION: WFL    ADL:   No ADL's completed this session..    IADL:   Meal Preparation: Pt. Completed IADL task of cooking a grilled cheese sandwich at the stove top.  Pt. Demo good recall and safety while turning stove on and off.  Pt. Gathered  completed unilateral reaching with support of RW standing x 10 mins to complete.  Minimal fatigue noted.  Activity performed to challenge Pt. Standing tolerance,balance and  overall activity tolerance to support ADL components.    Community Re-Integration: Pt. Completed IADL task of grocery shopping from prefabricated list.  Pt.'s standing tolerance, and balance challenged while reaching into all planes gathering items from list incorporating upper and lower regions within precautions. No LOB noted standing x 6 mins. Pt. Demo good adherence to TTWB on RLE throughout.      BALANCE:  Standing Balance: Stand By Assistance.      BED MOBILITY:  Not Tested    TRANSFERS:  Sit to Stand:  Stand By Assistance.    Stand to Sit: Stand By Assistance.      FUNCTIONAL MOBILITY:  Assistive Device: Rolling Walker  Assist Level:  Stand By

## 2024-12-16 NOTE — PLAN OF CARE
Problem: Chronic Conditions and Co-morbidities  Goal: Patient's chronic conditions and co-morbidity symptoms are monitored and maintained or improved  Outcome: Progressing  Flowsheets (Taken 12/16/2024 1642)  Care Plan - Patient's Chronic Conditions and Co-Morbidity Symptoms are Monitored and Maintained or Improved:   Monitor and assess patient's chronic conditions and comorbid symptoms for stability, deterioration, or improvement   Collaborate with multidisciplinary team to address chronic and comorbid conditions and prevent exacerbation or deterioration     Problem: Discharge Planning  Goal: Discharge to home or other facility with appropriate resources  Outcome: Progressing  Flowsheets (Taken 12/16/2024 1642)  Discharge to home or other facility with appropriate resources:   Identify barriers to discharge with patient and caregiver   Arrange for needed discharge resources and transportation as appropriate   Identify discharge learning needs (meds, wound care, etc)     Problem: ABCDS Injury Assessment  Goal: Absence of physical injury  12/16/2024 1642 by Chen Jefferson RN  Outcome: Progressing  Flowsheets (Taken 12/16/2024 1642)  Absence of Physical Injury: Implement safety measures based on patient assessment     Problem: Safety - Adult  Goal: Free from fall injury  12/16/2024 1642 by Chen Jefferson RN  Outcome: Progressing  Flowsheets (Taken 12/16/2024 1642)  Free From Fall Injury:   Instruct family/caregiver on patient safety   Based on caregiver fall risk screen, instruct family/caregiver to ask for assistance with transferring infant if caregiver noted to have fall risk factors       Problem: Pain  Goal: Verbalizes/displays adequate comfort level or baseline comfort level  12/16/2024 1642 by Chen Jefferson RN  Outcome: Progressing  Flowsheets (Taken 12/16/2024 1642)  Verbalizes/displays adequate comfort level or baseline comfort level:   Encourage patient to monitor pain and request assistance   Assess

## 2024-12-16 NOTE — CARE COORDINATION
Patient is alert, oriented and pleasant. He is sill complaining of pain and has been given pain medications as ordered. Vital signs taken and recorded. Patient is continent and goes to bathroom as needed with assistance. Patient is resting in his chair with his call light within reach.

## 2024-12-17 ENCOUNTER — APPOINTMENT (OUTPATIENT)
Dept: GENERAL RADIOLOGY | Age: 74
End: 2024-12-17
Attending: PHYSICAL MEDICINE & REHABILITATION
Payer: MEDICARE

## 2024-12-17 LAB
ANION GAP SERPL CALC-SCNC: 13 MEQ/L (ref 8–16)
BUN SERPL-MCNC: 19 MG/DL (ref 7–22)
CALCIUM SERPL-MCNC: 9.7 MG/DL (ref 8.5–10.5)
CHLORIDE SERPL-SCNC: 104 MEQ/L (ref 98–111)
CO2 SERPL-SCNC: 22 MEQ/L (ref 23–33)
CREAT SERPL-MCNC: 0.9 MG/DL (ref 0.4–1.2)
GFR SERPL CREATININE-BSD FRML MDRD: 90 ML/MIN/1.73M2
GLUCOSE SERPL-MCNC: 130 MG/DL (ref 70–108)
POTASSIUM SERPL-SCNC: 3.9 MEQ/L (ref 3.5–5.2)
SODIUM SERPL-SCNC: 139 MEQ/L (ref 135–145)

## 2024-12-17 PROCEDURE — 97530 THERAPEUTIC ACTIVITIES: CPT

## 2024-12-17 PROCEDURE — 71100 X-RAY EXAM RIBS UNI 2 VIEWS: CPT

## 2024-12-17 PROCEDURE — 97110 THERAPEUTIC EXERCISES: CPT

## 2024-12-17 PROCEDURE — 97116 GAIT TRAINING THERAPY: CPT

## 2024-12-17 PROCEDURE — 80048 BASIC METABOLIC PNL TOTAL CA: CPT

## 2024-12-17 PROCEDURE — 1180000000 HC REHAB R&B

## 2024-12-17 PROCEDURE — 6370000000 HC RX 637 (ALT 250 FOR IP): Performed by: PHYSICAL MEDICINE & REHABILITATION

## 2024-12-17 PROCEDURE — 36415 COLL VENOUS BLD VENIPUNCTURE: CPT

## 2024-12-17 PROCEDURE — 99232 SBSQ HOSP IP/OBS MODERATE 35: CPT | Performed by: PHYSICAL MEDICINE & REHABILITATION

## 2024-12-17 PROCEDURE — 97535 SELF CARE MNGMENT TRAINING: CPT

## 2024-12-17 PROCEDURE — 6360000002 HC RX W HCPCS: Performed by: PHYSICAL MEDICINE & REHABILITATION

## 2024-12-17 PROCEDURE — 6370000000 HC RX 637 (ALT 250 FOR IP): Performed by: FAMILY MEDICINE

## 2024-12-17 RX ORDER — METHOCARBAMOL 500 MG/1
500 TABLET, FILM COATED ORAL 3 TIMES DAILY PRN
Status: DISCONTINUED | OUTPATIENT
Start: 2024-12-17 | End: 2024-12-21 | Stop reason: HOSPADM

## 2024-12-17 RX ADMIN — MYCOPHENOLATE MOFETIL 1000 MG: 250 CAPSULE ORAL at 20:42

## 2024-12-17 RX ADMIN — METFORMIN HYDROCHLORIDE 500 MG: 500 TABLET ORAL at 09:47

## 2024-12-17 RX ADMIN — PYRIDOSTIGMINE BROMIDE 120 MG: 60 TABLET ORAL at 20:42

## 2024-12-17 RX ADMIN — ENOXAPARIN SODIUM 30 MG: 100 INJECTION SUBCUTANEOUS at 20:41

## 2024-12-17 RX ADMIN — CALCIUM 500 MG: 500 TABLET ORAL at 16:28

## 2024-12-17 RX ADMIN — MYCOPHENOLATE MOFETIL 1000 MG: 250 CAPSULE ORAL at 09:49

## 2024-12-17 RX ADMIN — DICLOFENAC SODIUM 2 G: 10 GEL TOPICAL at 20:43

## 2024-12-17 RX ADMIN — FINASTERIDE 5 MG: 5 TABLET, FILM COATED ORAL at 09:47

## 2024-12-17 RX ADMIN — PYRIDOSTIGMINE BROMIDE 120 MG: 60 TABLET ORAL at 09:48

## 2024-12-17 RX ADMIN — ACETAMINOPHEN 650 MG: 325 TABLET ORAL at 15:13

## 2024-12-17 RX ADMIN — ACETAMINOPHEN 650 MG: 325 TABLET ORAL at 09:50

## 2024-12-17 RX ADMIN — METFORMIN HYDROCHLORIDE 500 MG: 500 TABLET ORAL at 16:28

## 2024-12-17 RX ADMIN — ROPINIROLE HYDROCHLORIDE 2 MG: 1 TABLET, FILM COATED ORAL at 20:42

## 2024-12-17 RX ADMIN — ACETAMINOPHEN 650 MG: 325 TABLET ORAL at 20:42

## 2024-12-17 RX ADMIN — METHOCARBAMOL 500 MG: 500 TABLET ORAL at 20:45

## 2024-12-17 RX ADMIN — CALCIUM 500 MG: 500 TABLET ORAL at 09:47

## 2024-12-17 RX ADMIN — SENNOSIDES 8.6 MG: 8.6 TABLET, FILM COATED ORAL at 20:43

## 2024-12-17 RX ADMIN — Medication 1000 MCG: at 09:54

## 2024-12-17 RX ADMIN — PYRIDOSTIGMINE BROMIDE 120 MG: 60 TABLET ORAL at 15:12

## 2024-12-17 RX ADMIN — AMLODIPINE BESYLATE 5 MG: 5 TABLET ORAL at 09:48

## 2024-12-17 RX ADMIN — Medication 6 MG: at 20:43

## 2024-12-17 RX ADMIN — TAMSULOSIN HYDROCHLORIDE 0.4 MG: 0.4 CAPSULE ORAL at 09:47

## 2024-12-17 RX ADMIN — ERGOCALCIFEROL 50000 UNITS: 1.25 CAPSULE ORAL at 09:48

## 2024-12-17 RX ADMIN — ATORVASTATIN CALCIUM 40 MG: 40 TABLET, FILM COATED ORAL at 09:48

## 2024-12-17 RX ADMIN — ENOXAPARIN SODIUM 30 MG: 100 INJECTION SUBCUTANEOUS at 09:50

## 2024-12-17 RX ADMIN — DICLOFENAC SODIUM 2 G: 10 GEL TOPICAL at 09:48

## 2024-12-17 RX ADMIN — FAMOTIDINE 20 MG: 20 TABLET, FILM COATED ORAL at 09:50

## 2024-12-17 RX ADMIN — DOCUSATE SODIUM 100 MG: 100 CAPSULE, LIQUID FILLED ORAL at 20:43

## 2024-12-17 RX ADMIN — ONDANSETRON 4 MG: 4 TABLET, ORALLY DISINTEGRATING ORAL at 22:07

## 2024-12-17 RX ADMIN — FAMOTIDINE 20 MG: 20 TABLET, FILM COATED ORAL at 20:43

## 2024-12-17 RX ADMIN — FLUOXETINE HYDROCHLORIDE 40 MG: 20 CAPSULE ORAL at 09:47

## 2024-12-17 RX ADMIN — DOCUSATE SODIUM 100 MG: 100 CAPSULE, LIQUID FILLED ORAL at 09:50

## 2024-12-17 ASSESSMENT — PAIN SCALES - GENERAL
PAINLEVEL_OUTOF10: 0
PAINLEVEL_OUTOF10: 5
PAINLEVEL_OUTOF10: 0
PAINLEVEL_OUTOF10: 7

## 2024-12-17 ASSESSMENT — ENCOUNTER SYMPTOMS
SHORTNESS OF BREATH: 0
COUGH: 0
RHINORRHEA: 0
NAUSEA: 0
VOMITING: 0
TROUBLE SWALLOWING: 0
BACK PAIN: 0
WHEEZING: 0
ABDOMINAL PAIN: 1
SORE THROAT: 0
DIARRHEA: 0
CONSTIPATION: 0

## 2024-12-17 ASSESSMENT — PAIN - FUNCTIONAL ASSESSMENT: PAIN_FUNCTIONAL_ASSESSMENT: PREVENTS OR INTERFERES SOME ACTIVE ACTIVITIES AND ADLS

## 2024-12-17 ASSESSMENT — PAIN DESCRIPTION - LOCATION
LOCATION: RIB CAGE
LOCATION: LEG

## 2024-12-17 ASSESSMENT — PAIN DESCRIPTION - ORIENTATION
ORIENTATION: RIGHT;UPPER
ORIENTATION: RIGHT;LOWER

## 2024-12-17 ASSESSMENT — PAIN DESCRIPTION - DESCRIPTORS
DESCRIPTORS: SPASM;SHARP
DESCRIPTORS: SHARP

## 2024-12-17 ASSESSMENT — PAIN SCALES - WONG BAKER: WONGBAKER_NUMERICALRESPONSE: NO HURT

## 2024-12-17 NOTE — PLAN OF CARE
Problem: Chronic Conditions and Co-morbidities  Goal: Patient's chronic conditions and co-morbidity symptoms are monitored and maintained or improved  12/17/2024 1035 by Chen Jefferson RN  Outcome: Progressing  Flowsheets (Taken 12/17/2024 1035)  Care Plan - Patient's Chronic Conditions and Co-Morbidity Symptoms are Monitored and Maintained or Improved:   Monitor and assess patient's chronic conditions and comorbid symptoms for stability, deterioration, or improvement   Collaborate with multidisciplinary team to address chronic and comorbid conditions and prevent exacerbation or deterioration     Problem: Discharge Planning  Goal: Discharge to home or other facility with appropriate resources  12/17/2024 1035 by Chen Jefferson RN  Outcome: Progressing  Flowsheets (Taken 12/17/2024 1035)  Discharge to home or other facility with appropriate resources:   Identify barriers to discharge with patient and caregiver   Arrange for needed discharge resources and transportation as appropriate   Identify discharge learning needs (meds, wound care, etc)     Problem: ABCDS Injury Assessment  Goal: Absence of physical injury  12/17/2024 1035 by Chen Jefferson RN  Outcome: Progressing  Flowsheets (Taken 12/17/2024 1035)  Absence of Physical Injury: Implement safety measures based on patient assessment     Problem: Safety - Adult  Goal: Free from fall injury  12/17/2024 1035 by Chen Jefferson RN  Outcome: Progressing  Flowsheets (Taken 12/17/2024 1035)  Free From Fall Injury:   Instruct family/caregiver on patient safety   Based on caregiver fall risk screen, instruct family/caregiver to ask for assistance with transferring infant if caregiver noted to have fall risk factors     Problem: Pain  Goal: Verbalizes/displays adequate comfort level or baseline comfort level  12/17/2024 1035 by Chen Jefferson RN  Outcome: Progressing  Flowsheets (Taken 12/17/2024 1035)  Verbalizes/displays adequate comfort level or baseline comfort

## 2024-12-17 NOTE — PROGRESS NOTES
Patient: Duane Scheiderer  Unit/Bed: 8K-05/005-A  YOB: 1950  MRN: [id number] Acct: [id number]   Admitting Diagnosis: Periprosthetic fracture of shaft of femur [M97.8XXA, Z96.649]  Admit Date:  12/10/2024  Hospital Day: 6    Assessment:     Principal Problem:    Periprosthetic fracture of shaft of femur  Active Problems:    Controlled type 2 diabetes mellitus with complication, with long-term current use of insulin (HCC)    S/P total right hip arthroplasty    Chest wall contusion, right, initial encounter    Myasthenia gravis ([facility])    Essential hypertension    Obstructive sleep apnea syndrome    H/O thymoma    Benign prostatic hyperplasia    Class 1 obesity due to excess calories without serious comorbidity with body mass index (BMI) of 30.0 to 30.9 in adult  Resolved Problems:    * No resolved hospital problems. *      Plan:     We discussed the CS being acceptable with his insulin pump.        Subjective:     Patient has no complaint of CP or SOB..   Medication side effects: none    Scheduled Meds:   heparin (PF)  500 Units IntraCATHeter Daily    sodium chloride flush  5-40 mL IntraVENous Daily    pyRIDostigmine  120 mg Oral TID    vitamin B-12  1,000 mcg Oral Daily    Insulin Pump - Basal Dose   SubCUTAneous Daily    enoxaparin  30 mg SubCUTAneous BID    amLODIPine  5 mg Oral Daily    atorvastatin  40 mg Oral Daily    famotidine  20 mg Oral BID    finasteride  5 mg Oral Daily    FLUoxetine  40 mg Oral Daily    mycophenolate  1,000 mg Oral BID    rOPINIRole  2 mg Oral Nightly    tamsulosin  0.4 mg Oral Daily    Insulin Pump - Bolus Dose   SubCUTAneous 4x Daily AC & HS    docusate sodium  100 mg Oral BID    acetaminophen  650 mg Oral Q6H    senna  1 tablet Oral Nightly    melatonin  6 mg Oral Nightly    metFORMIN  500 mg Oral BID    [START ON 12/17/2024] vitamin D  50,000 Units Oral Weekly    diclofenac sodium  2 g Topical 4x daily    calcium elemental  500 mg Oral BID WC     Continuous Infusions:

## 2024-12-17 NOTE — PROGRESS NOTES
that he could use on the other side to get up the stairs    Ramp:  Contact Guard Assistance  With Rolling Walker  *Maintained TTWB'ing    Balance:  Static Standing Balance: Stand By Assistance  Dynamic Standing Balance: Stand By Assistance, Contact Guard Assistance    Patient stood at RW, completing single to bilateral hand release, maintaining right TTWB'ing throughout:  3 blaze pods set up with one cone in front of each.  Pt instructed to match colored cone to color of lit up blaze pod then tap blaze pod.  Patient instructed in tossing ring to target, completing single hand release.  Pt maintained TTWB'ing well.    Exercise:  Patient was guided in 1 set(s) 10 reps of exercises to both lower extremities. Exercises were completed for increased independence with functional mobility.  Supine:   -glute sets 5\" hold  -quad sets 5\" hold  -ankle pumps  -heel slides  -SLR with active assist right   *perfect served ortho and recommended pt continue with no right hip abduction at this time--education provided to patient  Seated:  -long arc quad    Functional Outcome Measures:  Not completed  Modified [place] Scale:  Not Applicable    ASSESSMENT:  Assessment: Patient progressing toward established goals.  Activity Tolerance:  Patient tolerance of  treatment:Good.  Plan: Current Treatment Recommendations: Strengthening, Balance training, Functional mobility training, Transfer training, Gait training, Safety education & training, Patient/Caregiver education & training, Equipment evaluation, education, & procurement, Therapeutic activities, Neuromuscular re-education  General Plan:  (5x/wk 90 min)    Education:  Learners: Patient  Patient Education: Transfers, Gait, Stairs, Car Transfers, Verbal Exercise Instruction,  - Patient Verbalized Understanding, - Patient Requires Continued Education    Goals:  Patient Goals : \"Be normal again without pain\"  Short Term Goals  Time Frame for Short Term Goals: 1 week  Short Term Goal 1:  Patient to perform bed mobility supine<>sit with Mod I in order to assist with getting into and out of bed.  Short Term Goal 2: Patient to perform sit<>stand transfers with use of RW and Supervision from various surface heights while maintaining TTWB on RLE in order to assist with safety with transfers in home.  Short Term Goal 3: Patient to ambulate >/=50 feet with RW and TTWB on RLE in order to assist with home mobility  Short Term Goal 4: Patient to ascend/descend 2 steps in parallel bars with BUE support and Minimal assistance in order to assist with home mobility.  Long Term Goals  Time Frame for Long Term Goals : 2 weeks from IPR evaluation  Long Term Goal 1: Patient to perform sit<>stand transfers with Mod I while maintaining TTWB on RLE in order to assist with safety with transfers in home.  Long Term Goal 2: Patient to ambulate >/=75 feet with use of RW while maintaining TTWB on RLE in order to assist with home mobility.  Long Term Goal 3: Patient to ascend/descend 4 steps with 1 HR and Supervision in order to assist with home entry.  Long Term Goal 4: Patient to perform car transfers with Supervision in order to assist with getting to and from appointments.    Following session, patient left in safe position with all fall risk precautions in place.

## 2024-12-17 NOTE — PLAN OF CARE
Problem: Chronic Conditions and Co-morbidities  Goal: Patient's chronic conditions and co-morbidity symptoms are monitored and maintained or improved  12/17/2024 0209 by Erika Carmen RN  Outcome: Progressing  Flowsheets (Taken 12/16/2024 2053)  Care Plan - Patient's Chronic Conditions and Co-Morbidity Symptoms are Monitored and Maintained or Improved: Monitor and assess patient's chronic conditions and comorbid symptoms for stability, deterioration, or improvement     Problem: Discharge Planning  Goal: Discharge to home or other facility with appropriate resources  12/17/2024 0209 by Erika Carmen, RN  Outcome: Progressing  Flowsheets (Taken 12/16/2024 2053)  Discharge to home or other facility with appropriate resources: Identify barriers to discharge with patient and caregiver     Problem: ABCDS Injury Assessment  Goal: Absence of physical injury  12/17/2024 0209 by Erika Carmen, RN  Outcome: Progressing     Problem: Safety - Adult  Goal: Free from fall injury  12/17/2024 0209 by Erika Carmen, RN  Outcome: Progressing     Problem: Pain  Goal: Verbalizes/displays adequate comfort level or baseline comfort level  12/17/2024 0209 by Erika Carmen, RN  Outcome: Progressing

## 2024-12-17 NOTE — PROGRESS NOTES
Patient is alert and oriented. He is continent and goes to bathroom as needed with assistance. He is complaining of right lateral rib cage and provider has been notified. Warm compress has been initiated to provide comfort. Patient participated in all of his therapy and tolerated them all. Patient is now on the way for Chest x-ray.

## 2024-12-17 NOTE — CARE COORDINATION
Went over safety precautions and to have staff with him during ambulation.  Declined to use his SCD's.  Ed on rationale and importance to prevent blood clots, still declined.

## 2024-12-17 NOTE — PROGRESS NOTES
[facility]  Occupational Therapy  Daily Note    Discharge Recommendations: Continue to assess pending progress and Patient would benefit from continued OT at discharge  Equipment Recommendations: Yes (shower chair)         Time In: 1025  Time Out: 1155  Timed Code Treatment Minutes: 90 Minutes  Minutes: 90          Date: 2024  Patient Name: Duane Scheiderer,   Gender: male      Room: [address] [place] [address]  MRN: [id number]  : 1950  (74 y.o.)  Referring Practitioner: Xander Phan MD  Diagnosis: Periprosthetic fracture of shaft of femur  Additional Pertinent Hx: 74 y.o. 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 2024: right total hip arthroplasty. His function was improving well postoperatively.  He began using straight cane to assist walking about 2 weeks ago. On 2024, the patient suddenly developed severe pain at the right hip radiating to right thigh to knee level when he tried to stand up to walk.  He then began having increasing difficulty to put weight on his right lower extremity to walk. On 2024 morning, while the patient went to bathroom to have bowel movement, fell forward to the ground landing on his knees with his upper body lean toward his  and small meals such as eggs)  Homemaking Responsibilities: Yes  Prior Level of Assist for Transfers: Independent  Prior Level of Assist for Ambulation: Independent household ambulator, with or without device, Independent community ambulator, with or without device  Has the patient had two or more falls in the past year or any fall with injury in the past year?: No (only one fall per patient)    Active : Yes  Patient's  Info: not driving at this time  Mode of Transportation: Van  Occupation: Retired  Type of Occupation: Trunk   Leisure & Hobbies: walking  Additional Comments: Pt reports he was IND with use of a cane, rollator occasionally. Pt had THR on the right on Nov 12. Pt went home with  PT, OT, and RN and was still current with those services at time of admit.    SUBJECTIVE: Upon arrival pt in bathroom.  Pt. Agreeable to OT session and agreeable to OT session.     PAIN: 10/10 R side abdomen pain, 6/10 RLE pain.  Dr. Phan and nursing made aware of abdomen pain.  Dr. Phan reports a chest x-ray has been conducted prior but he is ordering an additional x-ray.     Vitals: Vitals not assessed per clinical judgement, see nursing flowsheet    COGNITION: Decreased Safety Awareness    ADL:   Grooming: Supervision standing sinkside to engage in hand hygiene with cues to maintain TTWB in RLE    Toileting: Mod I artem care, Supervision clothing management    Toilet Transfer: Supervision from toilet (as pt seated on toilet prior to arrival).    IADL:   Meal Preparation: Pt. Instructed to retrieve frozen vegetables from freezer with overall SBA with cues to maintain TTWB in RLE and stood to prepare food and locate seasonings with SBA.  Pt. Required standing rest break with cues for safely maintain TTWB.  Pt. Able to tolerate standing >10 minutes before rest break required.     Pt. Engaged in leisure based dynamic task of watering plants and reaching at various levels all within precautions, with

## 2024-12-17 NOTE — CARE COORDINATION
Duane Scheiderer was evaluated today and a DME order was entered for a wheeled walker because he requires this to successfully complete daily living tasks of ambulating, dressing lower body, and meal preparation.  A wheeled walker is necessary due to the patient's unsteady gait, upper body weakness, and inability to  an ambulation device; and he can ambulate only by pushing a walker instead of a lesser assistive device such as a cane, crutch, or standard walker.  The need for this equipment was discussed with the patient and he understands and is in agreement.

## 2024-12-18 PROCEDURE — 99232 SBSQ HOSP IP/OBS MODERATE 35: CPT | Performed by: PHYSICAL MEDICINE & REHABILITATION

## 2024-12-18 PROCEDURE — 6370000000 HC RX 637 (ALT 250 FOR IP): Performed by: FAMILY MEDICINE

## 2024-12-18 PROCEDURE — 97530 THERAPEUTIC ACTIVITIES: CPT

## 2024-12-18 PROCEDURE — 97112 NEUROMUSCULAR REEDUCATION: CPT

## 2024-12-18 PROCEDURE — 6370000000 HC RX 637 (ALT 250 FOR IP): Performed by: PHYSICAL MEDICINE & REHABILITATION

## 2024-12-18 PROCEDURE — 97110 THERAPEUTIC EXERCISES: CPT

## 2024-12-18 PROCEDURE — 1180000000 HC REHAB R&B

## 2024-12-18 PROCEDURE — 97116 GAIT TRAINING THERAPY: CPT

## 2024-12-18 PROCEDURE — 6360000002 HC RX W HCPCS: Performed by: PHYSICAL MEDICINE & REHABILITATION

## 2024-12-18 PROCEDURE — 97535 SELF CARE MNGMENT TRAINING: CPT

## 2024-12-18 RX ADMIN — DOCUSATE SODIUM 100 MG: 100 CAPSULE, LIQUID FILLED ORAL at 08:37

## 2024-12-18 RX ADMIN — ACETAMINOPHEN 650 MG: 325 TABLET ORAL at 16:10

## 2024-12-18 RX ADMIN — Medication 1000 MCG: at 08:39

## 2024-12-18 RX ADMIN — METFORMIN HYDROCHLORIDE 500 MG: 500 TABLET ORAL at 08:38

## 2024-12-18 RX ADMIN — AMLODIPINE BESYLATE 5 MG: 5 TABLET ORAL at 08:40

## 2024-12-18 RX ADMIN — ENOXAPARIN SODIUM 30 MG: 100 INJECTION SUBCUTANEOUS at 08:36

## 2024-12-18 RX ADMIN — OXYCODONE HYDROCHLORIDE 2.5 MG: 5 TABLET ORAL at 22:01

## 2024-12-18 RX ADMIN — PYRIDOSTIGMINE BROMIDE 120 MG: 60 TABLET ORAL at 16:10

## 2024-12-18 RX ADMIN — ACETAMINOPHEN 650 MG: 325 TABLET ORAL at 10:20

## 2024-12-18 RX ADMIN — FAMOTIDINE 20 MG: 20 TABLET, FILM COATED ORAL at 22:02

## 2024-12-18 RX ADMIN — MYCOPHENOLATE MOFETIL 1000 MG: 250 CAPSULE ORAL at 22:02

## 2024-12-18 RX ADMIN — METFORMIN HYDROCHLORIDE 500 MG: 500 TABLET ORAL at 16:10

## 2024-12-18 RX ADMIN — Medication 6 MG: at 22:02

## 2024-12-18 RX ADMIN — DICLOFENAC SODIUM 2 G: 10 GEL TOPICAL at 22:02

## 2024-12-18 RX ADMIN — ENOXAPARIN SODIUM 30 MG: 100 INJECTION SUBCUTANEOUS at 22:02

## 2024-12-18 RX ADMIN — FAMOTIDINE 20 MG: 20 TABLET, FILM COATED ORAL at 08:37

## 2024-12-18 RX ADMIN — ACETAMINOPHEN 650 MG: 325 TABLET ORAL at 06:21

## 2024-12-18 RX ADMIN — CALCIUM 500 MG: 500 TABLET ORAL at 16:13

## 2024-12-18 RX ADMIN — ROPINIROLE HYDROCHLORIDE 2 MG: 1 TABLET, FILM COATED ORAL at 22:02

## 2024-12-18 RX ADMIN — MYCOPHENOLATE MOFETIL 1000 MG: 250 CAPSULE ORAL at 08:40

## 2024-12-18 RX ADMIN — FLUOXETINE HYDROCHLORIDE 40 MG: 20 CAPSULE ORAL at 08:39

## 2024-12-18 RX ADMIN — PYRIDOSTIGMINE BROMIDE 120 MG: 60 TABLET ORAL at 22:01

## 2024-12-18 RX ADMIN — DICLOFENAC SODIUM 2 G: 10 GEL TOPICAL at 13:18

## 2024-12-18 RX ADMIN — CALCIUM 500 MG: 500 TABLET ORAL at 08:39

## 2024-12-18 RX ADMIN — DICLOFENAC SODIUM 2 G: 10 GEL TOPICAL at 16:11

## 2024-12-18 RX ADMIN — PYRIDOSTIGMINE BROMIDE 120 MG: 60 TABLET ORAL at 08:37

## 2024-12-18 RX ADMIN — SENNOSIDES 8.6 MG: 8.6 TABLET, FILM COATED ORAL at 22:01

## 2024-12-18 RX ADMIN — FINASTERIDE 5 MG: 5 TABLET, FILM COATED ORAL at 08:38

## 2024-12-18 RX ADMIN — ATORVASTATIN CALCIUM 40 MG: 40 TABLET, FILM COATED ORAL at 08:39

## 2024-12-18 RX ADMIN — ACETAMINOPHEN 650 MG: 325 TABLET ORAL at 21:13

## 2024-12-18 RX ADMIN — OXYCODONE HYDROCHLORIDE 5 MG: 5 TABLET ORAL at 17:50

## 2024-12-18 RX ADMIN — DICLOFENAC SODIUM 2 G: 10 GEL TOPICAL at 08:40

## 2024-12-18 RX ADMIN — DOCUSATE SODIUM 100 MG: 100 CAPSULE, LIQUID FILLED ORAL at 22:02

## 2024-12-18 RX ADMIN — TAMSULOSIN HYDROCHLORIDE 0.4 MG: 0.4 CAPSULE ORAL at 08:38

## 2024-12-18 ASSESSMENT — PAIN SCALES - GENERAL
PAINLEVEL_OUTOF10: 8
PAINLEVEL_OUTOF10: 3
PAINLEVEL_OUTOF10: 6
PAINLEVEL_OUTOF10: 4
PAINLEVEL_OUTOF10: 8
PAINLEVEL_OUTOF10: 8
PAINLEVEL_OUTOF10: 9
PAINLEVEL_OUTOF10: 5

## 2024-12-18 ASSESSMENT — PAIN - FUNCTIONAL ASSESSMENT
PAIN_FUNCTIONAL_ASSESSMENT: PREVENTS OR INTERFERES SOME ACTIVE ACTIVITIES AND ADLS
PAIN_FUNCTIONAL_ASSESSMENT: PREVENTS OR INTERFERES WITH MANY ACTIVE NOT PASSIVE ACTIVITIES
PAIN_FUNCTIONAL_ASSESSMENT: PREVENTS OR INTERFERES SOME ACTIVE ACTIVITIES AND ADLS

## 2024-12-18 ASSESSMENT — PAIN DESCRIPTION - DESCRIPTORS
DESCRIPTORS: ACHING
DESCRIPTORS: ACHING;DISCOMFORT
DESCRIPTORS: ACHING;DISCOMFORT
DESCRIPTORS: ACHING
DESCRIPTORS: SHARP

## 2024-12-18 ASSESSMENT — ENCOUNTER SYMPTOMS
SHORTNESS OF BREATH: 0
TROUBLE SWALLOWING: 0
NAUSEA: 0
BACK PAIN: 0
SORE THROAT: 0
WHEEZING: 0
COUGH: 0
VOMITING: 0
DIARRHEA: 0
RHINORRHEA: 0
CONSTIPATION: 0

## 2024-12-18 ASSESSMENT — PAIN DESCRIPTION - LOCATION
LOCATION: RIB CAGE
LOCATION: RIB CAGE;BACK
LOCATION: RIB CAGE
LOCATION: RIB CAGE
LOCATION: BACK;RIB CAGE

## 2024-12-18 ASSESSMENT — PAIN DESCRIPTION - ORIENTATION
ORIENTATION: RIGHT;LOWER
ORIENTATION: RIGHT;LOWER
ORIENTATION: RIGHT

## 2024-12-18 NOTE — PROGRESS NOTES
Spiritual Health History and Assessment/Progress Note  [facility]    (P) Follow-up,  ,  ,      Name: Duane Scheiderer MRN: [id number]    Age: 74 y.o.     Sex: male   Language: English   Yazidism: Uatsdin   Periprosthetic fracture of shaft of femur     Date: 12/18/2024            Total Time Calculated: (P) 7 min              Spiritual Assessment continued in [facility]        Referral/Consult From: (P) Rounding   Encounter Overview/Reason: (P) Follow-up  Service Provided For: (P) Patient    Sakina, Belief, Meaning:   Patient identifies as spiritual and is connected with a sakina tradition or spiritual practice  Family/Friends No family/friends present      Importance and Influence:  Patient has spiritual/personal beliefs that influence decisions regarding their health and has no beliefs influential to healthcare decision-making identified during this visit  Family/Friends No family/friends present    Community:  Patient is connected with a spiritual community  Family/Friends No family/friends present    Assessment and Plan of Care:     Patient Interventions include: Facilitated expression of thoughts and feelings  Family/Friends Interventions include: No family/friends present    Patient Plan of Care: Spiritual Care available upon further referral  Family/Friends Plan of Care: No family/friends present    Electronically signed by LAUREN Latif on 12/18/2024 at 6:21 PM

## 2024-12-18 NOTE — PROGRESS NOTES
[facility]  Occupational Therapy  Progress Note    Discharge Recommendations: Home with UB HEP.   Equipment Recommendations: No Patient has shower chair, BSC, reacher, LH sponge brush. No further equpiment needs at this time      Time In: 1030  Time Out: 1200  Timed Code Treatment Minutes: 90 Minutes  Minutes: 90          Date: 2024  Patient Name: Duane Scheiderer,   Gender: male      Room: [address] [place] [address]  MRN: [id number]  : 1950  (74 y.o.)  Referring Practitioner: Xander Phan MD  Diagnosis: Periprosthetic fracture of shaft of femur  Additional Pertinent Hx: 74 y.o. 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 2024: right total hip arthroplasty. His function was improving well postoperatively.  He began using straight cane to assist walking about 2 weeks ago. On 2024, the patient suddenly developed severe pain at the right hip radiating to right thigh to knee level when he tried to stand up to walk.  He then began having increasing difficulty to put weight on his right lower extremity to walk. On 2024 morning, while the patient went to bathroom to have bowel movement, fell forward to the ground landing on his knees with his upper body lean toward  independence.  Plan: Times Per Week: 5x/week for 90 min  Current Treatment Recommendations: Strengthening, Balance training, Functional mobility training, Endurance training, Pain management, Safety education & training, Patient/Caregiver education & training, Equipment evaluation, education, & procurement, Self-Care / ADL, Positioning, Home management training    Education:  Learners: Patient  UB HEP, safety with transfers and mobility, ADL strategies    Goals  Patient goals : to be able to walk    Time Frame for Short Term Goals: 1 week  Short Term Goal 1: Pt will tolerate > 10 minutes of standing w/ supervision and 1-2 hand release to improve tolerance of standing ADLs/IADLs. GOAL NOT MET. CONTINUE.  Short Term Goal 2: Pt will complete LB dressing w/ Mod I and LHAE PRN. GOAL NOT MET. CONTINUE.  Short Term Goal 3: Pt will verbalize at least 3 energy conservation techniques to improve tolerance of daily occupations. GOAL MET AND DISCONTINUED  Short Term Goal 4: Pt with verbalize and adhere to TTWB status during ADLs w/ 0 verbal cues. GOAL MET AND DISCONTINUED        Time Frame for Long Term Goals : 3 weeks  Long Term Goal 1: Pt will demo improved occupational perforance as demonstrated by score of 90/100 on the Modified Barthel Index. GOAL NOT MET. CONTINUE.  Long Term Goal 2: Pt will complete simple IADL tasks with supervision assist to faciliate tasks within home/community environments. GOAL NOT MET. CONTINUE.  Long Term Goal 3: Pt will complete community re-entry task with no more than supervision assist. GOAL NOT MET. CONTINUE.          Revised Short-Term Goals  Short Term Goals  Time Frame for Short Term Goals: 1 week  Short Term Goal 1: Pt will tolerate > 10 minutes of standing w/ supervision and 1-2 hand release to improve tolerance of standing ADLs/IADLs.  Short Term Goal 2: Pt will complete LB dressing w/ Mod I and LHAE PRN.  Short Term Goal 3: -  Short Term Goal 4: -  Long Term Goals  Time Frame for Long

## 2024-12-18 NOTE — PLAN OF CARE
Problem: Chronic Conditions and Co-morbidities  Goal: Patient's chronic conditions and co-morbidity symptoms are monitored and maintained or improved  Outcome: Progressing  Flowsheets (Taken 12/17/2024 2026)  Care Plan - Patient's Chronic Conditions and Co-Morbidity Symptoms are Monitored and Maintained or Improved: Monitor and assess patient's chronic conditions and comorbid symptoms for stability, deterioration, or improvement     Problem: Discharge Planning  Goal: Discharge to home or other facility with appropriate resources  Outcome: Progressing  Flowsheets (Taken 12/17/2024 2026)  Discharge to home or other facility with appropriate resources: Identify barriers to discharge with patient and caregiver     Problem: ABCDS Injury Assessment  Goal: Absence of physical injury  Outcome: Progressing     Problem: Safety - Adult  Goal: Free from fall injury  Outcome: Progressing     Problem: Pain  Goal: Verbalizes/displays adequate comfort level or baseline comfort level  Outcome: Progressing  Flowsheets (Taken 12/17/2024 2145)  Verbalizes/displays adequate comfort level or baseline comfort level:   Encourage patient to monitor pain and request assistance   Assess pain using appropriate pain scale   Administer analgesics based on type and severity of pain and evaluate response   Implement non-pharmacological measures as appropriate and evaluate response

## 2024-12-18 NOTE — PROGRESS NOTES
Physical Medicine & Rehabilitation Progress Note    Chief Complaint:  Right femur periprosthetic fracture second to fall     Subjective:    Duane Scheiderer is a 74 y.o.  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     The patient says he developed gradual onset right hip and groin pain starting in late 2022 to early 2023.  The painful symptom progressively worsened and occasionally radiating down to his right lower extremity foot area.  He says the pain was particularly worse when he weightbearing and walk on his right lower extremity.  An x-ray of right hip done at [facility] on 12/7/2023 was reported to show mild osteoarthritic change of right hip.  Because of worsening symptoms he had right hip MRI done at the [facility] on 8/29/2024 and revealed severe right hip osteoarthritis with full-thickness chondral defect, moderate joint effusion, and degenerative signal and fraying of the acetabular labrum with paralabral cyst.  Therefore right hip replacement surgery was recommended.  The patient then was admitted to [facility] on 11/12/2024 and underwent right total hip

## 2024-12-18 NOTE — PROGRESS NOTES
[facility]  INPATIENT PHYSICAL THERAPY  PROGRESS NOTE  [facility] - 8K-05/005-A      Discharge Recommendations: Continue to assess pending progress and Patient would benefit from continued PT at discharge  Equipment Recommendations:Equipment Needed: Yes (Continue to assess pending progress (Patient has rollator will likely need RW))      Time In: 0700  Time Out: 0830  Timed Code Treatment Minutes: 90 Minutes  Minutes: 90          Date: 2024  Patient Name: Duane Scheiderer,  Gender:  male        MRN: [id number]  : 1950  (74 y.o.)     Referring Practitioner: Xander Phan MD  Diagnosis: Periprosthetic fracture of shaft of femur  Additional Pertinent Hx: 74 y.o. 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 2024: right total hip arthroplasty. His function was improving well postoperatively.  He began using straight cane to assist walking about 2 weeks ago. On 2024, the patient suddenly developed severe pain at the right hip radiating to right thigh to knee level when he tried to stand up to walk.  He then began having increasing difficulty to put weight on his right lower extremity to walk. On 2024 morning, while the patient went to bathroom to have bowel

## 2024-12-18 NOTE — CARE COORDINATION
Zofran given this shift and states it was effective.  C/O of nausea after taking his HS meds.  Goal is to continue to work with therapy.  Continent of b&b

## 2024-12-18 NOTE — PLAN OF CARE
Problem: Chronic Conditions and Co-morbidities  Goal: Patient's chronic conditions and co-morbidity symptoms are monitored and maintained or improved  12/18/2024 1327 by Margoth Carbajal RN  Outcome: Progressing  Flowsheets (Taken 12/17/2024 2026 by Erika Carmen RN)  Care Plan - Patient's Chronic Conditions and Co-Morbidity Symptoms are Monitored and Maintained or Improved: Monitor and assess patient's chronic conditions and comorbid symptoms for stability, deterioration, or improvement  12/18/2024 0116 by Erika Carmen RN  Outcome: Progressing  Flowsheets (Taken 12/17/2024 2026)  Care Plan - Patient's Chronic Conditions and Co-Morbidity Symptoms are Monitored and Maintained or Improved: Monitor and assess patient's chronic conditions and comorbid symptoms for stability, deterioration, or improvement     Problem: Discharge Planning  Goal: Discharge to home or other facility with appropriate resources  12/18/2024 1327 by Margoth Carbajal RN  Outcome: Progressing  Flowsheets (Taken 12/17/2024 2026 by Erika Carmen RN)  Discharge to home or other facility with appropriate resources: Identify barriers to discharge with patient and caregiver  12/18/2024 0116 by Erika Carmen RN  Outcome: Progressing  Flowsheets (Taken 12/17/2024 2026)  Discharge to home or other facility with appropriate resources: Identify barriers to discharge with patient and caregiver     Problem: ABCDS Injury Assessment  Goal: Absence of physical injury  12/18/2024 1327 by Margoth Carbajal RN  Outcome: Progressing  Flowsheets (Taken 12/17/2024 1035 by Chen Jefferson RN)  Absence of Physical Injury: Implement safety measures based on patient assessment  12/18/2024 0116 by Erika Carmen RN  Outcome: Progressing     Problem: Safety - Adult  Goal: Free from fall injury  12/18/2024 1327 by Margoth Carbajal RN  Outcome: Progressing  Flowsheets (Taken 12/17/2024 1035 by Chen Jefferson RN)  Free From Fall Injury:   Instruct

## 2024-12-18 NOTE — PROGRESS NOTES
Patient: Duane Scheiderer  Unit/Bed: 8K-05/005-A  YOB: 1950  MRN: [id number] Acct: [id number]   Admitting Diagnosis: Periprosthetic fracture of shaft of femur [M97.8XXA, Z96.649]  Admit Date:  12/10/2024  Hospital Day: 8    Assessment:     Principal Problem:    Periprosthetic fracture of shaft of femur  Active Problems:    Controlled type 2 diabetes mellitus with complication, with long-term current use of insulin ([facility])    S/P total right hip arthroplasty    Chest wall contusion, right, initial encounter    Myasthenia gravis ([facility])    Essential hypertension    Obstructive sleep apnea syndrome    H/O thymoma    Benign prostatic hyperplasia    Class 1 obesity due to excess calories without serious comorbidity with body mass index (BMI) of 30.0 to 30.9 in adult  Resolved Problems:    * No resolved hospital problems. *      Plan:     Reviewed the CS looking acceptable         Subjective:     Patient has no complaint of CP or SOB..   Medication side effects: none    Scheduled Meds:   pyRIDostigmine  120 mg Oral TID    vitamin B-12  1,000 mcg Oral Daily    Insulin Pump - Basal Dose   SubCUTAneous Daily    enoxaparin  30 mg SubCUTAneous BID    amLODIPine  5 mg Oral Daily    atorvastatin  40 mg Oral Daily    famotidine  20 mg Oral BID    finasteride  5 mg Oral Daily    FLUoxetine  40 mg Oral Daily    mycophenolate  1,000 mg Oral BID    rOPINIRole  2 mg Oral Nightly    tamsulosin  0.4 mg Oral Daily    Insulin Pump - Bolus Dose   SubCUTAneous 4x Daily AC & HS    docusate sodium  100 mg Oral BID    acetaminophen  650 mg Oral Q6H    senna  1 tablet Oral Nightly    melatonin  6 mg Oral Nightly    metFORMIN  500 mg Oral BID    vitamin D  50,000 Units Oral Weekly    diclofenac sodium  2 g Topical 4x daily    calcium elemental  500 mg Oral BID WC     Continuous Infusions:   sodium chloride      dextrose       PRN Meds:methocarbamol, pyRIDostigmine, sodium chloride, potassium chloride **OR** potassium alternative  oral replacement **OR** potassium chloride, magnesium sulfate, polyethylene glycol, glucose, dextrose bolus **OR** dextrose bolus, glucagon (rDNA), dextrose, acetaminophen, oxyCODONE **OR** oxyCODONE, ondansetron, bisacodyl, magnesium hydroxide, traZODone    Review of Systems  Pertinent items are noted in HPI.    Objective:     Patient Vitals for the past 8 hrs:   BP Temp Temp src Pulse Resp SpO2   12/18/24 0835 131/67 97.9 °F (36.6 °C) Oral 79 18 96 %     I/O last 3 completed shifts:  In: 2130 [P.O.:2130]  Out: 625 [Urine:625]  No intake/output data recorded.    /67   Pulse 79   Temp 97.9 °F (36.6 °C) (Oral)   Resp 18   Ht 1.905 m (6' 3\")   Wt 114.7 kg (252 lb 13.9 oz)   SpO2 96%   BMI 31.61 kg/m²     General appearance: alert, appears stated age, and cooperative  Head: Normocephalic, without obvious abnormality, atraumatic  Lungs: clear to auscultation bilaterally  Chest wall:  right rib soreness  Heart: regular rate and rhythm, S1, S2 normal, no murmur, click, rub or gallop  Abdomen: soft, non-tender; bowel sounds normal; no masses,  no organomegaly  Extremities: extremities normal, atraumatic, no cyanosis or edema  Skin: Skin color, texture, turgor normal. No rashes or lesions  Neurologic:  weak    Electronically signed by Jean Liu MD on 12/18/2024 at 11:38 AM

## 2024-12-18 NOTE — PROGRESS NOTES
[facility]  INPATIENT REHABILITATION  TEAM CONFERENCE NOTE    Conference Date: 2024  Admit Date:  12/10/2024  1:32 PM  Patient Name: Duane Scheiderer    MRN: [id number]    : 1950  (74 y.o.)  Rehabilitation Admitting Diagnosis:  Periprosthetic fracture of shaft of femur [M97.8XXA, Z96.649]  Referring Practitioner: Xander Phan MD      CASE MANAGEMENT  Current issues/needs regarding patient and family discharge status: Patient continues to be motivated and progressing with therapy. Patient plans to be discharged on Saturday,  with home health vs outpatient therapy pending patient's progress. SW will follow and maintain involvement in discharge planning.     PHYSICAL THERAPY  Mr Barcenas met 3/4 STG's and 2/4 LTG's.  Patient is demonstrating good progress in PT, and over the last week has progressed sit <  > stand from CGA to supervision, gait from CGA to SBA/supervision, and supine < > sit from SBA to modified independence.  Patient requires CGA to ascend/descend steps with 2 hand rails.  Pt continues to be limited by TTWB'ing status of his right LE and requires verbal cues at times to maintain TTWB'ing.  Duane will benefit from continued skilled PT services to continue to improve his ability to complete functional mobility, reduce his risk for falls and allow patient to return to home safely.  Equipment Needed: Yes (Continue to assess pending progress (Patient has rollator will likely need RW))    SPEECH THERAPY       OCCUPATIONAL THERAPY  Duane is making good steady progress towards therapy goals during IPR stay. He is able to complete functional transfers with supervision and short distance mobility with use of RW and supervision. Longer distance mobility patient requires more SBA. Duane is able to complete UB dressing with Jh and LB dressing with supervision. He is able to complete bathing tasks with supervision. Duane scored a 84/100 on the modified barthel index  oxygen: No  Patient/Family Education Focus: safety   Barriers to Education: none    No results for input(s): \"POCGLU\" in the last 72 hours.      No results found for: \"LDLDIRECT\"      Vitals:    12/18/24 2100 12/18/24 2201 12/18/24 2231 12/19/24 0847   BP: 123/73   123/69   Pulse: 71   75   Resp: 18 18 18 18   Temp: 97.7 °F (36.5 °C)   98.4 °F (36.9 °C)   TempSrc: Oral   Oral   SpO2: 98%   98%   Weight:       Height:              Family Education: Family available and participating in education   Fall Risk:  Falling star program initiated  Is the patient appropriate for a stay in the functional apartment? no    Discharge Plan   Estimated Discharge Date:  12/21/2024    Destination: outpatient therapies and discharge home with supervision  Services at Discharge: Outpatient Physical Therapy 3x week  Is patient appropriate for an outpatient driving evaluation? No. But need orthopedic surgeon's clearance   Equipment at Discharge: Other: Patient has shower chair, BSC, reacher, LH sponge brush. No further equpiment needs at this time  Factors facilitating achievement of predicted outcomes: Family support, Motivated, Cooperative, and Pleasant  Barriers to the achievement of predicted outcomes: Pain, Limited family support, Limited insight into deficits, Unrealistic expectations, Decreased endurance, Lower extremity weakness, Stairs at home, Skin Care, and Wound Care, weightbearing restriction  Follow up with physiatrist? no  If yes, what timeframe? N/A    Team Members Present at Conference:  :ISSAC Crane  Occupational Therapist:Christiane Bowles OTR/L [id number]  Physical Therapist:Mar Webber, [place] [id number]  Speech Therapist:N/A  Nurse:Audrey Londono RN   Psychologist: N/A    I approve the established interdisciplinary plan of care as documented within the medical record of Duane Scheiderer. I was present and led the interdisciplinary care conference.    KRISTIN BEAVER MD  Electronically signed by KRISTIN ARMSTRONG

## 2024-12-19 PROBLEM — S22.41XA CLOSED FRACTURE OF MULTIPLE RIBS OF RIGHT SIDE: Status: ACTIVE | Noted: 2024-12-19

## 2024-12-19 PROCEDURE — 97110 THERAPEUTIC EXERCISES: CPT

## 2024-12-19 PROCEDURE — 99232 SBSQ HOSP IP/OBS MODERATE 35: CPT | Performed by: PHYSICAL MEDICINE & REHABILITATION

## 2024-12-19 PROCEDURE — 97535 SELF CARE MNGMENT TRAINING: CPT

## 2024-12-19 PROCEDURE — 97530 THERAPEUTIC ACTIVITIES: CPT

## 2024-12-19 PROCEDURE — 6360000002 HC RX W HCPCS: Performed by: PHYSICAL MEDICINE & REHABILITATION

## 2024-12-19 PROCEDURE — 97116 GAIT TRAINING THERAPY: CPT

## 2024-12-19 PROCEDURE — 6370000000 HC RX 637 (ALT 250 FOR IP): Performed by: FAMILY MEDICINE

## 2024-12-19 PROCEDURE — 1180000000 HC REHAB R&B

## 2024-12-19 PROCEDURE — 6370000000 HC RX 637 (ALT 250 FOR IP): Performed by: PHYSICAL MEDICINE & REHABILITATION

## 2024-12-19 RX ADMIN — ACETAMINOPHEN 650 MG: 325 TABLET ORAL at 14:46

## 2024-12-19 RX ADMIN — ENOXAPARIN SODIUM 30 MG: 100 INJECTION SUBCUTANEOUS at 08:57

## 2024-12-19 RX ADMIN — DICLOFENAC SODIUM 2 G: 10 GEL TOPICAL at 17:29

## 2024-12-19 RX ADMIN — ATORVASTATIN CALCIUM 40 MG: 40 TABLET, FILM COATED ORAL at 08:58

## 2024-12-19 RX ADMIN — DOCUSATE SODIUM 100 MG: 100 CAPSULE, LIQUID FILLED ORAL at 20:19

## 2024-12-19 RX ADMIN — PYRIDOSTIGMINE BROMIDE 120 MG: 60 TABLET ORAL at 14:45

## 2024-12-19 RX ADMIN — Medication 6 MG: at 20:19

## 2024-12-19 RX ADMIN — METFORMIN HYDROCHLORIDE 500 MG: 500 TABLET ORAL at 17:29

## 2024-12-19 RX ADMIN — DICLOFENAC SODIUM 2 G: 10 GEL TOPICAL at 08:57

## 2024-12-19 RX ADMIN — FINASTERIDE 5 MG: 5 TABLET, FILM COATED ORAL at 08:58

## 2024-12-19 RX ADMIN — ROPINIROLE HYDROCHLORIDE 2 MG: 1 TABLET, FILM COATED ORAL at 20:19

## 2024-12-19 RX ADMIN — CALCIUM 500 MG: 500 TABLET ORAL at 08:58

## 2024-12-19 RX ADMIN — DICLOFENAC SODIUM 2 G: 10 GEL TOPICAL at 20:20

## 2024-12-19 RX ADMIN — ENOXAPARIN SODIUM 30 MG: 100 INJECTION SUBCUTANEOUS at 20:20

## 2024-12-19 RX ADMIN — DICLOFENAC SODIUM 2 G: 10 GEL TOPICAL at 14:46

## 2024-12-19 RX ADMIN — CALCIUM 500 MG: 500 TABLET ORAL at 17:29

## 2024-12-19 RX ADMIN — Medication 1000 MCG: at 08:58

## 2024-12-19 RX ADMIN — FLUOXETINE HYDROCHLORIDE 40 MG: 20 CAPSULE ORAL at 08:58

## 2024-12-19 RX ADMIN — ACETAMINOPHEN 650 MG: 325 TABLET ORAL at 08:58

## 2024-12-19 RX ADMIN — DOCUSATE SODIUM 100 MG: 100 CAPSULE, LIQUID FILLED ORAL at 08:58

## 2024-12-19 RX ADMIN — SENNOSIDES 8.6 MG: 8.6 TABLET, FILM COATED ORAL at 20:19

## 2024-12-19 RX ADMIN — PYRIDOSTIGMINE BROMIDE 120 MG: 60 TABLET ORAL at 20:19

## 2024-12-19 RX ADMIN — MYCOPHENOLATE MOFETIL 1000 MG: 250 CAPSULE ORAL at 20:19

## 2024-12-19 RX ADMIN — FAMOTIDINE 20 MG: 20 TABLET, FILM COATED ORAL at 20:20

## 2024-12-19 RX ADMIN — OXYCODONE HYDROCHLORIDE 5 MG: 5 TABLET ORAL at 21:26

## 2024-12-19 RX ADMIN — TAMSULOSIN HYDROCHLORIDE 0.4 MG: 0.4 CAPSULE ORAL at 08:58

## 2024-12-19 RX ADMIN — MYCOPHENOLATE MOFETIL 1000 MG: 250 CAPSULE ORAL at 08:57

## 2024-12-19 RX ADMIN — METFORMIN HYDROCHLORIDE 500 MG: 500 TABLET ORAL at 08:58

## 2024-12-19 RX ADMIN — PYRIDOSTIGMINE BROMIDE 120 MG: 60 TABLET ORAL at 08:58

## 2024-12-19 RX ADMIN — FAMOTIDINE 20 MG: 20 TABLET, FILM COATED ORAL at 08:58

## 2024-12-19 RX ADMIN — ACETAMINOPHEN 650 MG: 325 TABLET ORAL at 20:20

## 2024-12-19 RX ADMIN — AMLODIPINE BESYLATE 5 MG: 5 TABLET ORAL at 08:58

## 2024-12-19 ASSESSMENT — ENCOUNTER SYMPTOMS
BACK PAIN: 0
RHINORRHEA: 0
NAUSEA: 0
ABDOMINAL PAIN: 0
SHORTNESS OF BREATH: 0
SORE THROAT: 0
TROUBLE SWALLOWING: 0
COUGH: 0
DIARRHEA: 0
VOMITING: 0
CONSTIPATION: 0
WHEEZING: 0

## 2024-12-19 ASSESSMENT — PAIN DESCRIPTION - ONSET: ONSET: ON-GOING

## 2024-12-19 ASSESSMENT — PAIN DESCRIPTION - ORIENTATION
ORIENTATION: RIGHT
ORIENTATION: RIGHT

## 2024-12-19 ASSESSMENT — PAIN SCALES - GENERAL
PAINLEVEL_OUTOF10: 7
PAINLEVEL_OUTOF10: 5
PAINLEVEL_OUTOF10: 6

## 2024-12-19 ASSESSMENT — PAIN DESCRIPTION - DESCRIPTORS
DESCRIPTORS: ACHING
DESCRIPTORS: STABBING;SHARP

## 2024-12-19 ASSESSMENT — PAIN DESCRIPTION - LOCATION
LOCATION: RIB CAGE
LOCATION: HIP

## 2024-12-19 ASSESSMENT — PAIN - FUNCTIONAL ASSESSMENT: PAIN_FUNCTIONAL_ASSESSMENT: PREVENTS OR INTERFERES SOME ACTIVE ACTIVITIES AND ADLS

## 2024-12-19 ASSESSMENT — PAIN DESCRIPTION - PAIN TYPE: TYPE: ACUTE PAIN

## 2024-12-19 ASSESSMENT — PAIN DESCRIPTION - FREQUENCY: FREQUENCY: INTERMITTENT

## 2024-12-19 NOTE — PROGRESS NOTES
[facility]  Recreational Therapy  Daily Note  [facility]    Time Spent with Patient: 0 minutes    Date:  12/19/2024       Patient Name: Duane Scheiderer      MRN: [id number]      YOB: 1950 (74 y.o.)       Gender: male     Referring Practitioner: Xander Phan MD    RESTRICTIONS/PRECAUTIONS:  Restrictions/Precautions: Weight Bearing, ROM Restrictions, General Precautions, Fall Risk     Hearing: Within functional limits    PAIN: N/A    SUBJECTIVE:  I will help you    OBJECTIVE:  Pt was going to help RT wrap gifts for an adoptive family for [place] but his wife and friends came in and he has sat on the north pod with them for lunch and they have enjoyed visiting this afternoon-         Patient Education  New Education Provided: Importance of Leisure,     Electronically signed by: Sidra Escalante, CTRS  Date: 12/19/2024

## 2024-12-19 NOTE — PROGRESS NOTES
[facility]  INPATIENT PHYSICAL THERAPY  DAILY NOTE  [facility] - 8K-05/005-A      Discharge Recommendations: Continue to assess pending progress and Patient would benefit from continued PT at discharge  Equipment Recommendations: Yes (Continue to assess pending progress (Patient has rollator will likely need RW))               Time In: 0900  Time Out: 1030  Timed Code Treatment Minutes: 90 Minutes  Minutes: 90          Date: 2024  Patient Name: Duane Scheiderer,  Gender:  male        MRN: [id number]  : 1950  (74 y.o.)     Referring Practitioner: Xander Phan MD  Diagnosis: Periprosthetic fracture of shaft of femur  Additional Pertinent Hx: 74 y.o. 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 2024: right total hip arthroplasty. His function was improving well postoperatively.  He began using straight cane to assist walking about 2 weeks ago. On 2024, the patient suddenly developed severe pain at the right hip radiating to right thigh to knee level when he tried to stand up to walk.  He then began having increasing difficulty to put weight on his right lower extremity to walk. On 2024 morning, while the patient went to bathroom to have bowel movement, fell  Exercises were completed for increased independence with functional mobility.    Functional Outcome Measures:  Not completed  Modified [place]ozal Scale:  Not Applicable    ASSESSMENT:  Assessment: Patient progressing toward established goals.  Activity Tolerance:  Patient tolerance of  treatment:Good.  Plan: Current Treatment Recommendations: Strengthening, Balance training, Functional mobility training, Transfer training, Gait training, Safety education & training, Patient/Caregiver education & training, Equipment evaluation, education, & procurement, Therapeutic activities, Neuromuscular re-education  General Plan:  (5x/wk 90 min)    Education:  Learners: Patient  Patient Education: Plan of Care, Precautions/Restrictions, Transfers, Gait, Stairs, Verbal Exercise Instruction    Goals:  Patient Goals : \"Be normal again without pain\"  Short Term Goals  Time Frame for Short Term Goals: 1 week  Short Term Goal 1: Patient to perform bed mobility supine<>sit with Mod I in order to assist with getting into and out of bed. GOAL MET  Short Term Goal 2: Patient to perform sit<>stand transfers with use of RW and Supervision from various surface heights while maintaining TTWB on RLE in order to assist with safety with transfers in home.  Short Term Goal 3: Patient to ambulate >/=50 feet with RW and TTWB on RLE in order to assist with home mobility  Short Term Goal 4: Patient to ascend/descend 4 steps with 2 hand rails with BUE support and Minimal assistance in order to assist with home mobility.  Long Term Goals  Time Frame for Long Term Goals : 2 weeks from IPR evaluation  Long Term Goal 1: Patient to perform sit<>stand transfers with Mod I while maintaining TTWB on RLE in order to assist with safety with transfers in home.  Long Term Goal 2: Patient to ambulate >/=75 feet with use of RW with modified independence while maintaining TTWB on RLE in order to assist with home mobility.  Long Term Goal 3: Patient to ascend/descend 4

## 2024-12-19 NOTE — PROGRESS NOTES
Discharge pain assessment:    Complete within 3 days of discharge.      Pain Effect on Sleep ()   Ask patient: \"Over the past 5 days, how much of the time has pain made it hard for you to sleep at night?\" 4.  Almost constantly     If no pain is reported, end interview.  If pain is reported, continue with the additional questions.   Pain Interference with Therapy Activities   Ask patient: \"Over the past 5 days, how often have you limited your participation in rehabilitation therapy sessions due to pain?\" 1.  Rarely or not at all   Pain Interference with Day to Day Activities   Ask patient: \"Over the past 5 days, how often have you limited your day to day activities (excluding rehabilitation therapy sessions) because of pain?\" 1.  Rarely or not at all

## 2024-12-19 NOTE — PLAN OF CARE
Problem: Discharge Planning  Goal: Discharge to home or other facility with appropriate resources  12/19/2024 1224 by Jenna Russell LISW-S  Note: Team conference held Thursday, 12/19. Recommendations of the team were explained to the patient and Hannah by Dr Phan and SW. Team is recommending that patient continue on acute inpatient rehab for PT and OT, with expected discharge date of Saturday, 12/21. Following discharge, team is recommending outpatient PT. Patient prefers outpatient therapy at [facility]. Care plan reviewed with patient and Hannah. Patient and Hannah verbalized understanding of the plan of care and contributed to goal setting. SW to follow and maintain involvement in discharge planning.

## 2024-12-19 NOTE — PROGRESS NOTES
; no rale: No crackles; no rhonchi; tenderness at right lower lateral anterior rib cage edge, intensity increased with coughing but not deep inspiration  Gastrointestinal : soft, protruded abdomen; tenderness at right lateral upper abdomen near the rib cage; normal bowel sound present   Back : no tenderness; no muscle spasm; presence of surgical scar at midline of lumbar spine  Skin: no skin lesion or rash ; no pitting edema at all 4 extremities; presence of new surgical scar at right lateral hip to upper thigh with scab without visible discharge with diminishing mild erythema surrounding the scar; healed old surgical scar at the anterior left knee  Musculoskeletal : no limb asymmetry; slightly obese limbs; no limb deformity; mild tenderness at right proximal thigh muscle; no tenderness at the risks of bilateral upper & lower extremities; no palpable mass at limbs ; right hip joint laxity not assessed; no joints laxity or crepitation ; right shoulder flexion and abduction passive ROM reaching 165 degrees; left shoulder flexion and abduction passive ROM reaching 170 degrees; left hip flexion passive ROM reaching 110 degrees; right hip flexion passive ROM reaching 90 degrees; ankle dorsiflexion passive ROM reaching 15 degrees bilaterally; normal functional joints ROM at the rest of bilateral upper & lower extremities  Cerebral :  alert ; awake ; oriented to place, person and time; follow 2-3 step verbal command  Cerebellum : no dysmetria with bilateral finger-to-nose test ; heel-to-shin test not performed  Cranial Nerves :  grossly intact CN II to XII function  Sensory : intact and symmetrical light touch and pin prick sensation at bilateral upper extremities; intact and symmetrical light touch sensation at the bilateral lower extremities; slightly reduced pinprick sensation at left medial knee and right medial ankle area compared to the opposite side  Motor : normal tone at bilateral upper & lower extremities ; 4+/5  inherent in voice recognition technology

## 2024-12-19 NOTE — PROGRESS NOTES
[facility]  Occupational Therapy  Daily Note    Discharge Recommendations:  Home with UB HEP  Equipment Recommendations: No Patient has shower chair, BSC, reacher, LH sponge brush. No further equpiment needs at this time      Time In: 0700  Time Out: 0830  Timed Code Treatment Minutes: 90 Minutes  Minutes: 90          Date: 2024  Patient Name: Duane Scheiderer,   Gender: male      Room: [address] [place] [address]  MRN: [id number]  : 1950  (74 y.o.)  Referring Practitioner: Xander Phan MD  Diagnosis: Periprosthetic fracture of shaft of femur  Additional Pertinent Hx: 74 y.o. 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 2024: right total hip arthroplasty. His function was improving well postoperatively.  He began using straight cane to assist walking about 2 weeks ago. On 2024, the patient suddenly developed severe pain at the right hip radiating to right thigh to knee level when he tried to stand up to walk.  He then began having increasing difficulty to put weight on his right lower extremity to walk. On 2024 morning, while the patient went to bathroom to have bowel movement, fell forward to the ground landing on his knees with his upper body lean toward his  Equipment evaluation, education, & procurement, Self-Care / ADL, Positioning, Home management training    Education:  Learners: Patient and Family  ADL's, IADL's, Home Exercise Program, Precautions, Family Education, Reviewed Prior Education, Home Safety, Fall Prevention, and Assistive Device Safety    Goals  Short Term Goals  Time Frame for Short Term Goals: 1 week  Short Term Goal 1: Pt will tolerate > 10 minutes of standing w/ supervision and 1-2 hand release to improve tolerance of standing ADLs/IADLs.  Short Term Goal 2: Pt will complete LB dressing w/ Mod I and LHAE PRN.  Short Term Goal 3: -  Short Term Goal 4: -  Long Term Goals  Time Frame for Long Term Goals : 3 weeks  Long Term Goal 1: Pt will demo improved occupational perforance as demonstrated by score of 90/100 on the Modified Barthel Index.  Long Term Goal 2: Pt will complete simple IADL tasks with supervision assist to faciliate tasks within home/community environments.  Long Term Goal 3: Pt will complete community re-entry task with no more than supervision assist.    Following session, patient left in safe position with all fall risk precautions in place.

## 2024-12-19 NOTE — PLAN OF CARE
Problem: Discharge Planning  Goal: Discharge to home or other facility with appropriate resources  12/19/2024 0136 by Derrell Jefferson, RN  Outcome: Progressing  Flowsheets (Taken 12/18/2024 2100)  Discharge to home or other facility with appropriate resources: Identify barriers to discharge with patient and caregiver     Problem: ABCDS Injury Assessment  Goal: Absence of physical injury  12/19/2024 0136 by Derrell Jefferson, RN  Outcome: Progressing     Problem: Safety - Adult  Goal: Free from fall injury  12/19/2024 0136 by Derrell Jefferson, RN  Outcome: Progressing     Problem: Pain  Goal: Verbalizes/displays adequate comfort level or baseline comfort level  12/19/2024 0136 by Derrell Jefferson, RN  Outcome: Progressing  Flowsheets (Taken 12/18/2024 2113)  Verbalizes/displays adequate comfort level or baseline comfort level: Encourage patient to monitor pain and request assistance

## 2024-12-19 NOTE — PLAN OF CARE
Problem: Chronic Conditions and Co-morbidities  Goal: Patient's chronic conditions and co-morbidity symptoms are monitored and maintained or improved  12/19/2024 1335 by Vonda Alfredo LPN  Outcome: Progressing  Flowsheets (Taken 12/19/2024 1335)  Care Plan - Patient's Chronic Conditions and Co-Morbidity Symptoms are Monitored and Maintained or Improved: Monitor and assess patient's chronic conditions and comorbid symptoms for stability, deterioration, or improvement     Problem: Discharge Planning  Goal: Discharge to home or other facility with appropriate resources  12/19/2024 1335 by Vonda Alfredo LPN  Outcome: Progressing  Flowsheets (Taken 12/19/2024 1335)  Discharge to home or other facility with appropriate resources: Identify barriers to discharge with patient and caregiver  Note: Patient is planning to be discharged 12/21/24.     Problem: ABCDS Injury Assessment  Goal: Absence of physical injury  12/19/2024 1335 by Vonda Alfredo LPN  Outcome: Progressing  Flowsheets (Taken 12/19/2024 1335)  Absence of Physical Injury: Implement safety measures based on patient assessment     Problem: Safety - Adult  Goal: Free from fall injury  12/19/2024 1335 by Vonda Alfredo LPN  Outcome: Progressing  Flowsheets (Taken 12/19/2024 1335)  Free From Fall Injury:   Instruct family/caregiver on patient safety   Based on caregiver fall risk screen, instruct family/caregiver to ask for assistance with transferring infant if caregiver noted to have fall risk factors     Problem: Pain  Goal: Verbalizes/displays adequate comfort level or baseline comfort level  12/19/2024 1335 by Vonda Alfredo LPN  Outcome: Progressing  Flowsheets (Taken 12/19/2024 1335)  Verbalizes/displays adequate comfort level or baseline comfort level:   Encourage patient to monitor pain and request assistance   Assess pain using appropriate pain scale   Administer analgesics based on type and severity of pain and  evaluate response   Implement non-pharmacological measures as appropriate and evaluate response

## 2024-12-20 PROCEDURE — 97530 THERAPEUTIC ACTIVITIES: CPT

## 2024-12-20 PROCEDURE — 99232 SBSQ HOSP IP/OBS MODERATE 35: CPT | Performed by: PHYSICAL MEDICINE & REHABILITATION

## 2024-12-20 PROCEDURE — 97535 SELF CARE MNGMENT TRAINING: CPT

## 2024-12-20 PROCEDURE — 6370000000 HC RX 637 (ALT 250 FOR IP): Performed by: PHYSICAL MEDICINE & REHABILITATION

## 2024-12-20 PROCEDURE — 1180000000 HC REHAB R&B

## 2024-12-20 PROCEDURE — 97112 NEUROMUSCULAR REEDUCATION: CPT

## 2024-12-20 PROCEDURE — 6370000000 HC RX 637 (ALT 250 FOR IP): Performed by: FAMILY MEDICINE

## 2024-12-20 PROCEDURE — 97110 THERAPEUTIC EXERCISES: CPT

## 2024-12-20 PROCEDURE — 97116 GAIT TRAINING THERAPY: CPT

## 2024-12-20 PROCEDURE — 6360000002 HC RX W HCPCS: Performed by: PHYSICAL MEDICINE & REHABILITATION

## 2024-12-20 RX ORDER — OXYCODONE HYDROCHLORIDE 5 MG/1
2.5-5 TABLET ORAL EVERY 6 HOURS PRN
Qty: 28 TABLET | Refills: 0 | Status: SHIPPED | OUTPATIENT
Start: 2024-12-20 | End: 2024-12-27

## 2024-12-20 RX ORDER — FINASTERIDE 5 MG/1
5 TABLET, FILM COATED ORAL DAILY
Qty: 30 TABLET | Refills: 3 | Status: SHIPPED | OUTPATIENT
Start: 2024-12-20

## 2024-12-20 RX ORDER — POLYETHYLENE GLYCOL 3350 17 G/17G
17 POWDER, FOR SOLUTION ORAL DAILY PRN
Qty: 527 G | Refills: 3 | Status: SHIPPED | OUTPATIENT
Start: 2024-12-20

## 2024-12-20 RX ORDER — METHOCARBAMOL 500 MG/1
500 TABLET, FILM COATED ORAL 3 TIMES DAILY PRN
Qty: 90 TABLET | Refills: 2 | Status: SHIPPED | OUTPATIENT
Start: 2024-12-20

## 2024-12-20 RX ORDER — SENNOSIDES A AND B 8.6 MG/1
1 TABLET, FILM COATED ORAL NIGHTLY
Qty: 30 TABLET | Refills: 3 | Status: SHIPPED | OUTPATIENT
Start: 2024-12-21

## 2024-12-20 RX ORDER — CALCIUM CARBONATE 500(1250)
500 TABLET ORAL 2 TIMES DAILY WITH MEALS
Qty: 60 TABLET | Refills: 3 | Status: SHIPPED | OUTPATIENT
Start: 2024-12-21

## 2024-12-20 RX ORDER — PSEUDOEPHEDRINE HCL 30 MG
100 TABLET ORAL 2 TIMES DAILY
Qty: 60 CAPSULE | Refills: 3 | Status: SHIPPED | OUTPATIENT
Start: 2024-12-21

## 2024-12-20 RX ADMIN — DOCUSATE SODIUM 100 MG: 100 CAPSULE, LIQUID FILLED ORAL at 07:49

## 2024-12-20 RX ADMIN — METFORMIN HYDROCHLORIDE 500 MG: 500 TABLET ORAL at 16:50

## 2024-12-20 RX ADMIN — DICLOFENAC SODIUM 2 G: 10 GEL TOPICAL at 07:53

## 2024-12-20 RX ADMIN — FAMOTIDINE 20 MG: 20 TABLET, FILM COATED ORAL at 07:50

## 2024-12-20 RX ADMIN — SENNOSIDES 8.6 MG: 8.6 TABLET, FILM COATED ORAL at 21:32

## 2024-12-20 RX ADMIN — ACETAMINOPHEN 650 MG: 325 TABLET ORAL at 07:50

## 2024-12-20 RX ADMIN — OXYCODONE HYDROCHLORIDE 5 MG: 5 TABLET ORAL at 21:33

## 2024-12-20 RX ADMIN — FAMOTIDINE 20 MG: 20 TABLET, FILM COATED ORAL at 21:28

## 2024-12-20 RX ADMIN — MYCOPHENOLATE MOFETIL 1000 MG: 250 CAPSULE ORAL at 21:26

## 2024-12-20 RX ADMIN — DICLOFENAC SODIUM 2 G: 10 GEL TOPICAL at 12:53

## 2024-12-20 RX ADMIN — DOCUSATE SODIUM 100 MG: 100 CAPSULE, LIQUID FILLED ORAL at 21:34

## 2024-12-20 RX ADMIN — METFORMIN HYDROCHLORIDE 500 MG: 500 TABLET ORAL at 07:49

## 2024-12-20 RX ADMIN — ROPINIROLE HYDROCHLORIDE 2 MG: 1 TABLET, FILM COATED ORAL at 21:25

## 2024-12-20 RX ADMIN — METHOCARBAMOL 500 MG: 500 TABLET ORAL at 21:24

## 2024-12-20 RX ADMIN — PYRIDOSTIGMINE BROMIDE 120 MG: 60 TABLET ORAL at 16:50

## 2024-12-20 RX ADMIN — DICLOFENAC SODIUM 2 G: 10 GEL TOPICAL at 21:35

## 2024-12-20 RX ADMIN — CALCIUM 500 MG: 500 TABLET ORAL at 07:49

## 2024-12-20 RX ADMIN — TAMSULOSIN HYDROCHLORIDE 0.4 MG: 0.4 CAPSULE ORAL at 07:49

## 2024-12-20 RX ADMIN — MYCOPHENOLATE MOFETIL 1000 MG: 250 CAPSULE ORAL at 07:49

## 2024-12-20 RX ADMIN — FINASTERIDE 5 MG: 5 TABLET, FILM COATED ORAL at 07:50

## 2024-12-20 RX ADMIN — ENOXAPARIN SODIUM 30 MG: 100 INJECTION SUBCUTANEOUS at 21:32

## 2024-12-20 RX ADMIN — ENOXAPARIN SODIUM 30 MG: 100 INJECTION SUBCUTANEOUS at 07:53

## 2024-12-20 RX ADMIN — PYRIDOSTIGMINE BROMIDE 120 MG: 60 TABLET ORAL at 07:49

## 2024-12-20 RX ADMIN — ACETAMINOPHEN 650 MG: 325 TABLET ORAL at 14:25

## 2024-12-20 RX ADMIN — Medication 6 MG: at 21:32

## 2024-12-20 RX ADMIN — CALCIUM 500 MG: 500 TABLET ORAL at 16:50

## 2024-12-20 RX ADMIN — PYRIDOSTIGMINE BROMIDE 120 MG: 60 TABLET ORAL at 21:24

## 2024-12-20 RX ADMIN — ATORVASTATIN CALCIUM 40 MG: 40 TABLET, FILM COATED ORAL at 07:50

## 2024-12-20 RX ADMIN — Medication 1000 MCG: at 07:49

## 2024-12-20 RX ADMIN — FLUOXETINE HYDROCHLORIDE 40 MG: 20 CAPSULE ORAL at 07:49

## 2024-12-20 RX ADMIN — AMLODIPINE BESYLATE 5 MG: 5 TABLET ORAL at 07:50

## 2024-12-20 RX ADMIN — DICLOFENAC SODIUM 2 G: 10 GEL TOPICAL at 16:51

## 2024-12-20 RX ADMIN — OXYCODONE HYDROCHLORIDE 5 MG: 5 TABLET ORAL at 07:56

## 2024-12-20 RX ADMIN — ACETAMINOPHEN 650 MG: 325 TABLET ORAL at 21:32

## 2024-12-20 ASSESSMENT — PAIN DESCRIPTION - ORIENTATION
ORIENTATION: RIGHT

## 2024-12-20 ASSESSMENT — PAIN DESCRIPTION - DESCRIPTORS
DESCRIPTORS: SHARP
DESCRIPTORS: SHARP
DESCRIPTORS: ACHING;DISCOMFORT;SPASM
DESCRIPTORS: ACHING;DISCOMFORT
DESCRIPTORS: SHARP

## 2024-12-20 ASSESSMENT — PAIN DESCRIPTION - ONSET
ONSET: ON-GOING
ONSET: ON-GOING

## 2024-12-20 ASSESSMENT — PAIN DESCRIPTION - LOCATION
LOCATION: ABDOMEN
LOCATION: LEG
LOCATION: RIB CAGE
LOCATION: RIB CAGE
LOCATION: ABDOMEN

## 2024-12-20 ASSESSMENT — PAIN DESCRIPTION - FREQUENCY
FREQUENCY: CONTINUOUS
FREQUENCY: CONTINUOUS

## 2024-12-20 ASSESSMENT — PAIN DESCRIPTION - PAIN TYPE
TYPE: ACUTE PAIN
TYPE: ACUTE PAIN

## 2024-12-20 ASSESSMENT — PAIN SCALES - GENERAL
PAINLEVEL_OUTOF10: 7
PAINLEVEL_OUTOF10: 8
PAINLEVEL_OUTOF10: 9
PAINLEVEL_OUTOF10: 8
PAINLEVEL_OUTOF10: 6

## 2024-12-20 ASSESSMENT — PAIN SCALES - WONG BAKER: WONGBAKER_NUMERICALRESPONSE: NO HURT

## 2024-12-20 NOTE — PROGRESS NOTES
Patient: Duane Scheiderer  Unit/Bed: 8K-05/005-A  YOB: 1950  MRN: [id number] Acct: [id number]   Admitting Diagnosis: Periprosthetic fracture of shaft of femur [M97.8XXA, Z96.649]  Admit Date:  12/10/2024  Hospital Day: 10    Assessment:     Principal Problem:    Periprosthetic fracture of shaft of femur  Active Problems:    Controlled type 2 diabetes mellitus with complication, with long-term current use of insulin ([facility])    S/P total right hip arthroplasty    Chest wall contusion, right, initial encounter    Myasthenia gravis ([facility])    Essential hypertension    Obstructive sleep apnea syndrome    H/O thymoma    Benign prostatic hyperplasia    Class 1 obesity due to excess calories without serious comorbidity with body mass index (BMI) of 30.0 to 30.9 in adult    Closed fracture of multiple ribs of right side  Resolved Problems:    * No resolved hospital problems. *      Plan:     Medically stable for discharge tomorrow        Subjective:     Patient has no complaint of CP or SOB..   Medication side effects: none    Scheduled Meds:   pyRIDostigmine  120 mg Oral TID    vitamin B-12  1,000 mcg Oral Daily    Insulin Pump - Basal Dose   SubCUTAneous Daily    enoxaparin  30 mg SubCUTAneous BID    amLODIPine  5 mg Oral Daily    atorvastatin  40 mg Oral Daily    famotidine  20 mg Oral BID    finasteride  5 mg Oral Daily    FLUoxetine  40 mg Oral Daily    mycophenolate  1,000 mg Oral BID    rOPINIRole  2 mg Oral Nightly    tamsulosin  0.4 mg Oral Daily    Insulin Pump - Bolus Dose   SubCUTAneous 4x Daily AC & HS    docusate sodium  100 mg Oral BID    acetaminophen  650 mg Oral Q6H    senna  1 tablet Oral Nightly    melatonin  6 mg Oral Nightly    metFORMIN  500 mg Oral BID    vitamin D  50,000 Units Oral Weekly    diclofenac sodium  2 g Topical 4x daily    calcium elemental  500 mg Oral BID WC     Continuous Infusions:   sodium chloride      dextrose       PRN Meds:methocarbamol, pyRIDostigmine, sodium

## 2024-12-20 NOTE — PLAN OF CARE
Problem: Discharge Planning  Goal: Discharge to home or other facility with appropriate resources  12/20/2024 1241 by Jenna Russell LISW-S  Note: Patient to be discharged on Saturday, 12/21 to home. Patient will be under the supervision of his wife, Hannah. Family education was provided throughout patient's stay. Patient will be receiving services through [facility]. REGIS provided information to [facility] on 12/20 via fax.      REGIS spoke with Amber at [facility] to confirm patient's discharge and no need for home health services.    No outstanding needs at this time.    Transportation:   Has transportation kept you from medical appointments, meetings, work, or from getting things needed for daily living? (Check all that apply)  No.      Health Literacy:   How often do you need to have someone help you when you read instructions, pamphlets, or other written material from your doctor or pharmacy?  0. - Never    Social Isolation:  How often do you feel lonely or isolated from those around you?  0. Never      Patient Mood Interview (PHQ-2 to 9) (from [employer]©)   Say to Patient: \"Over the last 2 weeks, have you been bothered by any of the following problems?\"   If symptom is present, enter 1 (yes) in column 1 (Symptom Presence)  If yes in column 1, then ask the patient: “About how often have you been bothered by this?”  Read and show the patient a card with the symptom frequency choices.    Indicate response in column 2, Symptom Frequency.   Symptom Presence  No (enter 0 in column 2)   Yes (enter 0-3 in column 2)  9.    No response (leave column 2 blank) Symptom Frequency  Never or 1 day  2-6 days (several days)  7-11 days (half or more of the days)  12-14 days (nearly every day    Symptom Presence Symptom Frequency   Little interest or pleasure in doing things 1. Yes 1. 2-6 days (several days)   Feeling down, depressed, or hopeless 0. No 0. Never or 1 day

## 2024-12-20 NOTE — PROGRESS NOTES
[facility]  Diagnosis List for Inpatient Rehab facility (IRF) - Patient Assessment Instrument (NATHAN)    Patient Name: Duane Scheiderer        MRN: [id number]    : 1950  (74 y.o.)  Gender: male     Primary impairment requiring rehabilitation: 8.2 Femur (Shaft) Fracture     Etiologic Diagnosis that led to the condition: Proximal right femur nondisplaced oblique periprosthetic fracture    Comorbid conditions affecting rehabilitation:  Status post fall on 2024 resulting  Proximal right femur nondisplaced oblique periprosthetic fracture  Right lateral lower chest wall and abdomen contusion  Status post right total hip arthroplasty surgery on 2024 for severe osteoarthritis  Diabetes mellitus of type II with hyperglycemia  History of thymoma with myasthenia gravis requiring thymectomy  Sleep apnea  Hypertension  Hyperlipidemia  Class I obesity with BMI of 30.56  Benign prostate hypertrophy  History of left total hip arthroplasty surgery  History of left knee total knee arthroplasty surgery  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  History of 2 cervical spine and 4 lumbar spine surgeries  History of status post infusion port placement     KRISTIN BEAVER MD

## 2024-12-20 NOTE — PROGRESS NOTES
Physical Medicine & Rehabilitation Progress Note    Chief Complaint:  Right femur periprosthetic fracture second to fall     Subjective:    Duane Scheiderer is a 74 y.o.  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     The patient says he developed gradual onset right hip and groin pain starting in late 2022 to early 2023.  The painful symptom progressively worsened and occasionally radiating down to his right lower extremity foot area.  He says the pain was particularly worse when he weightbearing and walk on his right lower extremity.  An x-ray of right hip done at [facility] on 12/7/2023 was reported to show mild osteoarthritic change of right hip.  Because of worsening symptoms he had right hip MRI done at the [facility] on 8/29/2024 and revealed severe right hip osteoarthritis with full-thickness chondral defect, moderate joint effusion, and degenerative signal and fraying of the acetabular labrum with paralabral cyst.  Therefore right hip replacement surgery was recommended.  The patient then was admitted to [facility] on 11/12/2024 and underwent right total hip

## 2024-12-20 NOTE — PROGRESS NOTES
[facility]  Occupational Therapy  Daily Note    Discharge Recommendations: Home with Home Exercise Program  Equipment Recommendations: No Patient has shower chair, BSC, reacher, LH sponge brush. No further equpiment needs at this time      Time In: 1040  Time Out: 1210  Timed Code Treatment Minutes: 90 Minutes  Minutes: 90          Date: 2024  Patient Name: Duane Scheiderer,   Gender: male      Room: [address] [place] [address]  MRN: [id number]  : 1950  (74 y.o.)  Referring Practitioner: Xander Phan MD  Diagnosis: Periprosthetic fracture of shaft of femur  Additional Pertinent Hx: 74 y.o. 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 2024: right total hip arthroplasty. His function was improving well postoperatively.  He began using straight cane to assist walking about 2 weeks ago. On 2024, the patient suddenly developed severe pain at the right hip radiating to right thigh to knee level when he tried to stand up to walk.  He then began having increasing difficulty to put weight on his right lower extremity to walk. On 2024 morning, while the patient went to bathroom to have bowel movement, fell forward to the ground landing on his knees with his upper body  able to complete hip mgmt with BUE release..  CARE Score: 6.       FOOTWEAR:Independent  to doff/don B slip on shoes while seated..  CARE Score: 6.       TOILET TRANSFER: Independent.  <> raised toilet seat. CARE Score: 6.        Shower Transfer: Supervision.  <> walk in shower. Pt engaged in dry run shower transfer .    IADL:   Meal Preparation: Pt engaged in IADL cooking task preparing egg, toast and glass of water all within appropriate time of one another to increase standing balance, activity tolerance, sequencing in order to improve ease with BADL routine. Pt tolerated task > 15 min with supervision. Pt able to gather and clean up all materials required. Pt demo'ed good safety transporting items throughout therapy apartment. Pt demo'ed good attn to RLE precautions throughout activity. Pt transported meal to table with use of RW tray. Pt engaged in education for purchasing AE. Pt karieo'michael good understanding.      Basic Housekeeping: Pt engaged in IADL task washing dishes with BUE release to increase standing balance, activity tolerance in order to improve ease with grooming tasks. Pt tolerated > 8 min standing at sink. Pt completed with mod I and corey'michael no LOB.   Modified Barthel Index administered this date with pt scoring 98/100 indicating Slight Dependence (91-99) with daily task completion.     BALANCE:  Sitting Balance:  Independent.    Standing Balance: Modified Independent.      BED MOBILITY:  Not Tested    TRANSFERS:  Sit to Stand:  Modified Independent. To low kitchen chair with no arms   Stand to Sit: Modified Independent. Pt required supervision to low chair with no arm rests     FUNCTIONAL MOBILITY:  Assistive Device: Rolling Walker  Assist Level:  Supervision.   Distance: To and from bathroom and within therapy apartment.      ADDITIONAL ACTIVITIES:  Patient completed BUE strengthening exercises with skilled education on HEP: completed x10 reps x1 set with a 8# dowel rg in all joints and all

## 2024-12-20 NOTE — PLAN OF CARE
Problem: Discharge Planning  Goal: Discharge to home or other facility with appropriate resources  12/20/2024 1024 by Janee Herrera RN  Outcome: Progressing  Flowsheets (Taken 12/20/2024 1024)  Discharge to home or other facility with appropriate resources:   Identify barriers to discharge with patient and caregiver   Identify discharge learning needs (meds, wound care, etc)   Arrange for needed discharge resources and transportation as appropriate   Arrange for interpreters to assist at discharge as needed     Problem: ABCDS Injury Assessment  Goal: Absence of physical injury  12/20/2024 1024 by Janee Herrera RN  Outcome: Progressing  Flowsheets (Taken 12/20/2024 1024)  Absence of Physical Injury: Implement safety measures based on patient assessment     Problem: Safety - Adult  Goal: Free from fall injury  12/20/2024 1024 by Janee Herrera RN  Outcome: Progressing  Flowsheets (Taken 12/20/2024 1024)  Free From Fall Injury:   Instruct family/caregiver on patient safety   Based on caregiver fall risk screen, instruct family/caregiver to ask for assistance with transferring infant if caregiver noted to have fall risk factors     Problem: Pain  Goal: Verbalizes/displays adequate comfort level or baseline comfort level  12/20/2024 1024 by Janee Herrrea RN  Outcome: Progressing  Flowsheets (Taken 12/20/2024 1024)  Verbalizes/displays adequate comfort level or baseline comfort level:   Encourage patient to monitor pain and request assistance   Administer analgesics based on type and severity of pain and evaluate response   Assess pain using appropriate pain scale   Implement non-pharmacological measures as appropriate and evaluate response   Care plan reviewed with Pt. Pt verbalizes understanding of plan of care and contributes to goal setting.

## 2024-12-20 NOTE — PROGRESS NOTES
[facility]  INPATIENT PHYSICAL THERAPY  DAILY NOTE  [facility] - 8K-05/005-A      Discharge Recommendations: Home with Outpatient PT and Patient would benefit from continued PT at discharge  Equipment Recommendations: Yes (Continue to assess pending progress (Patient has rollator will likely need RW))               Time In: 1500  Time Out: 1630  Timed Code Treatment Minutes: 90 Minutes  Minutes: 90          Date: 2024  Patient Name: Duane Scheiderer,  Gender:  male        MRN: [id number]  : 1950  (74 y.o.)     Referring Practitioner: Xander Phan MD  Diagnosis: Periprosthetic fracture of shaft of femur  Additional Pertinent Hx: 74 y.o. 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 2024: right total hip arthroplasty. His function was improving well postoperatively.  He began using straight cane to assist walking about 2 weeks ago. On 2024, the patient suddenly developed severe pain at the right hip radiating to right thigh to knee level when he tried to stand up to walk.  He then began having increasing difficulty to put weight on his right lower extremity to walk. On 2024 morning, while the patient went to bathroom to have bowel movement, fell forward to

## 2024-12-20 NOTE — PROGRESS NOTES
[facility]  Recreational Therapy  Discharge Note  Inpatient Rehabilitation Unit         Date:  12/20/2024       Patient Name: Duane Scheiderer      MRN: [id number]       YOB: 1950 (74 y.o.)       Gender: male     Referring Practitioner: Xander Phan MD    Patient discharged from Recreational Therapy at this time.  See recreational therapy notes for details.    Electronically signed by: Sidra Escalante, CTRS  Date: 12/20/2024

## 2024-12-20 NOTE — PLAN OF CARE
Problem: Chronic Conditions and Co-morbidities  Goal: Patient's chronic conditions and co-morbidity symptoms are monitored and maintained or improved  12/19/2024 2311 by Misty Weaver, RN  Outcome: Progressing  Flowsheets (Taken 12/19/2024 2311)  Care Plan - Patient's Chronic Conditions and Co-Morbidity Symptoms are Monitored and Maintained or Improved:   Monitor and assess patient's chronic conditions and comorbid symptoms for stability, deterioration, or improvement   Collaborate with multidisciplinary team to address chronic and comorbid conditions and prevent exacerbation or deterioration   Update acute care plan with appropriate goals if chronic or comorbid symptoms are exacerbated and prevent overall improvement and discharge     Problem: Discharge Planning  Goal: Discharge to home or other facility with appropriate resources  12/19/2024 2311 by Misty Weaver, RN  Outcome: Progressing  Flowsheets (Taken 12/19/2024 2311)  Discharge to home or other facility with appropriate resources:   Identify discharge learning needs (meds, wound care, etc)   Identify barriers to discharge with patient and caregiver   Arrange for needed discharge resources and transportation as appropriate   Refer to discharge planning if patient needs post-hospital services based on physician order or complex needs related to functional status, cognitive ability or social support system     Problem: ABCDS Injury Assessment  Goal: Absence of physical injury  12/19/2024 2311 by Misty Weaver, RN  Outcome: Progressing  Flowsheets (Taken 12/19/2024 2311)  Absence of Physical Injury: Implement safety measures based on patient assessment     Problem: Safety - Adult  Goal: Free from fall injury  12/19/2024 2311 by Misty Weaver, RN  Outcome: Progressing  Flowsheets (Taken 12/19/2024 2311)  Free From Fall Injury:   Based on caregiver fall risk screen, instruct family/caregiver to ask for assistance with transferring infant if caregiver  noted to have fall risk factors   Instruct family/caregiver on patient safety     Problem: Pain  Goal: Verbalizes/displays adequate comfort level or baseline comfort level  12/19/2024 2311 by Misty Weaver RN  Outcome: Progressing  Flowsheets (Taken 12/19/2024 2311)  Verbalizes/displays adequate comfort level or baseline comfort level:   Encourage patient to monitor pain and request assistance   Administer analgesics based on type and severity of pain and evaluate response   Implement non-pharmacological measures as appropriate and evaluate response   Assess pain using appropriate pain scale   Care plan reviewed with patient. Patient verbalized understanding of plan of care and contributes to goal setting.

## 2024-12-20 NOTE — PROGRESS NOTES
Patient: Duane Scheiderer  Unit/Bed: 8K-05/005-A  YOB: 1950  MRN: [id number] Acct: [id number]   Admitting Diagnosis: Periprosthetic fracture of shaft of femur [M97.8XXA, Z96.649]  Admit Date:  12/10/2024  Hospital Day: 10    Assessment:     Principal Problem:    Periprosthetic fracture of shaft of femur  Active Problems:    Controlled type 2 diabetes mellitus with complication, with long-term current use of insulin ([facility])    S/P total right hip arthroplasty    Chest wall contusion, right, initial encounter    Myasthenia gravis ([facility])    Essential hypertension    Obstructive sleep apnea syndrome    H/O thymoma    Benign prostatic hyperplasia    Class 1 obesity due to excess calories without serious comorbidity with body mass index (BMI) of 30.0 to 30.9 in adult    Closed fracture of multiple ribs of right side  Resolved Problems:    * No resolved hospital problems. *      Plan:     The CS on the phone look well.        Subjective:     Patient has no complaint of CP or SOB..   Medication side effects: none    Scheduled Meds:   pyRIDostigmine  120 mg Oral TID    vitamin B-12  1,000 mcg Oral Daily    Insulin Pump - Basal Dose   SubCUTAneous Daily    enoxaparin  30 mg SubCUTAneous BID    amLODIPine  5 mg Oral Daily    atorvastatin  40 mg Oral Daily    famotidine  20 mg Oral BID    finasteride  5 mg Oral Daily    FLUoxetine  40 mg Oral Daily    mycophenolate  1,000 mg Oral BID    rOPINIRole  2 mg Oral Nightly    tamsulosin  0.4 mg Oral Daily    Insulin Pump - Bolus Dose   SubCUTAneous 4x Daily AC & HS    docusate sodium  100 mg Oral BID    acetaminophen  650 mg Oral Q6H    senna  1 tablet Oral Nightly    melatonin  6 mg Oral Nightly    metFORMIN  500 mg Oral BID    vitamin D  50,000 Units Oral Weekly    diclofenac sodium  2 g Topical 4x daily    calcium elemental  500 mg Oral BID WC     Continuous Infusions:   sodium chloride      dextrose       PRN Meds:methocarbamol, pyRIDostigmine, sodium chloride,  potassium chloride **OR** potassium alternative oral replacement **OR** potassium chloride, magnesium sulfate, polyethylene glycol, glucose, dextrose bolus **OR** dextrose bolus, glucagon (rDNA), dextrose, acetaminophen, oxyCODONE **OR** oxyCODONE, ondansetron, bisacodyl, magnesium hydroxide, traZODone    Review of Systems  Pertinent items are noted in HPI.    Objective:     Patient Vitals for the past 8 hrs:   BP Temp Temp src Pulse Resp SpO2   12/20/24 0730 126/62 97.7 °F (36.5 °C) Oral 72 18 98 %     I/O last 3 completed shifts:  In: 920 [P.O.:920]  Out: 350 [Urine:350]  No intake/output data recorded.    /62   Pulse 72   Temp 97.7 °F (36.5 °C) (Oral)   Resp 18   Ht 1.905 m (6' 3\")   Wt 114.7 kg (252 lb 13.9 oz)   SpO2 98%   BMI 31.61 kg/m²     General appearance: alert, appears stated age, and cooperative  Head: Normocephalic, without obvious abnormality, atraumatic  Lungs: clear to auscultation bilaterally  Chest wall: right sided soreness from the broken ribs  Heart: regular rate and rhythm, S1, S2 normal, no murmur, click, rub or gallop  Abdomen: soft, non-tender; bowel sounds normal; no masses,  no organomegaly  Extremities: extremities normal, atraumatic, no cyanosis or edema  Skin: Skin color, texture, turgor normal. No rashes or lesions  Neurologic:  weak    Electronically signed by Jean Liu MD on 12/20/2024 at 8:14 AM

## 2024-12-21 VITALS
TEMPERATURE: 97.3 F | RESPIRATION RATE: 16 BRPM | HEIGHT: 75 IN | BODY MASS INDEX: 31.44 KG/M2 | WEIGHT: 252.87 LBS | OXYGEN SATURATION: 98 % | DIASTOLIC BLOOD PRESSURE: 59 MMHG | SYSTOLIC BLOOD PRESSURE: 133 MMHG | HEART RATE: 72 BPM

## 2024-12-21 PROCEDURE — 6370000000 HC RX 637 (ALT 250 FOR IP): Performed by: FAMILY MEDICINE

## 2024-12-21 PROCEDURE — 97116 GAIT TRAINING THERAPY: CPT

## 2024-12-21 PROCEDURE — 6370000000 HC RX 637 (ALT 250 FOR IP): Performed by: PHYSICAL MEDICINE & REHABILITATION

## 2024-12-21 PROCEDURE — 97530 THERAPEUTIC ACTIVITIES: CPT

## 2024-12-21 PROCEDURE — 99239 HOSP IP/OBS DSCHRG MGMT >30: CPT | Performed by: PHYSICAL MEDICINE & REHABILITATION

## 2024-12-21 PROCEDURE — 97535 SELF CARE MNGMENT TRAINING: CPT

## 2024-12-21 PROCEDURE — 6360000002 HC RX W HCPCS: Performed by: PHYSICAL MEDICINE & REHABILITATION

## 2024-12-21 RX ADMIN — FAMOTIDINE 20 MG: 20 TABLET, FILM COATED ORAL at 09:25

## 2024-12-21 RX ADMIN — TAMSULOSIN HYDROCHLORIDE 0.4 MG: 0.4 CAPSULE ORAL at 09:25

## 2024-12-21 RX ADMIN — TRAZODONE HYDROCHLORIDE 50 MG: 50 TABLET ORAL at 00:00

## 2024-12-21 RX ADMIN — AMLODIPINE BESYLATE 5 MG: 5 TABLET ORAL at 09:24

## 2024-12-21 RX ADMIN — ENOXAPARIN SODIUM 30 MG: 100 INJECTION SUBCUTANEOUS at 09:24

## 2024-12-21 RX ADMIN — METFORMIN HYDROCHLORIDE 500 MG: 500 TABLET ORAL at 09:24

## 2024-12-21 RX ADMIN — CALCIUM 500 MG: 500 TABLET ORAL at 09:24

## 2024-12-21 RX ADMIN — PYRIDOSTIGMINE BROMIDE 120 MG: 60 TABLET ORAL at 09:24

## 2024-12-21 RX ADMIN — ACETAMINOPHEN 650 MG: 325 TABLET ORAL at 09:25

## 2024-12-21 RX ADMIN — FINASTERIDE 5 MG: 5 TABLET, FILM COATED ORAL at 09:25

## 2024-12-21 RX ADMIN — Medication 1000 MCG: at 09:24

## 2024-12-21 RX ADMIN — ATORVASTATIN CALCIUM 40 MG: 40 TABLET, FILM COATED ORAL at 09:24

## 2024-12-21 RX ADMIN — DICLOFENAC SODIUM 2 G: 10 GEL TOPICAL at 09:31

## 2024-12-21 RX ADMIN — FLUOXETINE HYDROCHLORIDE 40 MG: 20 CAPSULE ORAL at 09:24

## 2024-12-21 NOTE — PROGRESS NOTES
Occupational Therapy  [facility]  Occupational Therapy  Discharge Note    Discharge Recommendations: Home with Home Exercise Program  Equipment Recommendations: No Patient has shower chair, BSC, reacher, LH sponge brush. No further equpiment needs at this time    Time In: 0730  Time Out: 0800  Timed Code Treatment Minutes: 30 Minutes  Minutes: 30          Date: 2024  Patient Name: Duane Scheiderer,   Gender: male      Room: [address] [place] [address]  MRN: [id number]  : 1950  (74 y.o.)  Referring Practitioner: Xander Phan MD  Diagnosis: Periprosthetic fracture of shaft of femur  Additional Pertinent Hx: 74 y.o. 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 2024: right total hip arthroplasty. His function was improving well postoperatively.  He began using straight cane to assist walking about 2 weeks ago. On 2024, the patient suddenly developed severe pain at the right hip radiating to right thigh to knee level when he tried to stand up to walk.  He then began having increasing difficulty to put weight on his right lower extremity to walk. On 2024 morning, while the patient went to bathroom to have bowel movement, fell forward to the ground landing on his  knees with his upper body lean toward his right side over the bathtub.  His wife says the patient was unresponsive briefly when she came to assist him. X-ray of right hip, pelvis and right femur with downward on 12/6/2024 and revealed status post right hip arthroplasty, and thin spiral periprosthetic fracture line along the femoral prosthesis.  Orthopedic was consulted on 12/6/2024. The patient was placed on right lower extremity toe-touch weightbearing restriction with a walker.  CT of the right hip without contrast was ordered by Ortho and performed on 12/6/2024 showing nondisplaced oblique periprosthetic fracture in proximal right femur, and 11.8 x 8 x 4 cm collection adjacent to the proximal right femur. PM&R was consulted on 12/8/2020 for an inpatient rehabilitation treatment was recommended.    Restrictions/Precautions:  Restrictions/Precautions: Weight Bearing, ROM Restrictions, General Precautions, Fall Risk  Right Lower Extremity Weight Bearing: Toe Touch Weight Bearing  Position Activity Restriction  Hip Precautions: No hip flexion > 90 degrees, No ADduction, No hip internal rotation, No ABduction  Other Position/Activity Restrictions: Right THR Nov 12, 2024, clarified with Dulce with ortho on 12/17/24--continue to hold on right hip abduction at this time    Social/Functional History:  Lives With: Spouse (brother in law)  Type of Home: House  Home Layout: Two level  Home Access: Stairs to enter with rails  Entrance Stairs - Number of Steps: 4 RUPESH with 1 handrail. Pt has a lift gait over the steps if needed.  Home Equipment: Cane, Walker - 4-Wheeled, Wheelchair - Electric (Transport Chair)   Bathroom Shower/Tub: Walk-in shower  Bathroom Toilet: Handicap height  Bathroom Equipment: Hand-held shower, Toilet raiser  Bathroom Accessibility: Walker accessible       Prior Level of Assist for ADLs: Needs assistance  Prior Level of Assist for Homemaking: Needs assistance (Patient spouse performs IADL's however

## 2024-12-21 NOTE — CARE COORDINATION
Up in the W/C until around 0100. Trazodone 50mg given due to not being able to sleep. Patient states he has too much going through his mind since he is leaving today. Wife remains present in the recliner and is supportive. Resting quietly in bed at this time.

## 2024-12-21 NOTE — PLAN OF CARE
Problem: Chronic Conditions and Co-morbidities  Goal: Patient's chronic conditions and co-morbidity symptoms are monitored and maintained or improved  12/21/2024 0941 by Tipton, Rylee, LPN  Outcome: Completed  Flowsheets (Taken 12/21/2024 0937)  Care Plan - Patient's Chronic Conditions and Co-Morbidity Symptoms are Monitored and Maintained or Improved:   Collaborate with multidisciplinary team to address chronic and comorbid conditions and prevent exacerbation or deterioration   Monitor and assess patient's chronic conditions and comorbid symptoms for stability, deterioration, or improvement     Problem: Discharge Planning  Goal: Discharge to home or other facility with appropriate resources  12/21/2024 0941 by Tipton, Rylee, LPN  Outcome: Completed  Flowsheets (Taken 12/21/2024 0937)  Discharge to home or other facility with appropriate resources:   Identify barriers to discharge with patient and caregiver   Identify discharge learning needs (meds, wound care, etc)     Problem: ABCDS Injury Assessment  Goal: Absence of physical injury  12/21/2024 0941 by Tipton, Rylee, LPN  Outcome: Completed     Problem: Safety - Adult  Goal: Free from fall injury  12/21/2024 0941 by Tipton, Rylee, LPN  Outcome: Completed     Problem: Pain  Goal: Verbalizes/displays adequate comfort level or baseline comfort level  12/21/2024 0941 by Tipton, Rylee, LPN  Outcome: Completed  Flowsheets (Taken 12/21/2024 0900)  Verbalizes/displays adequate comfort level or baseline comfort level:   Encourage patient to monitor pain and request assistance   Assess pain using appropriate pain scale   Administer analgesics based on type and severity of pain and evaluate response   Implement non-pharmacological measures as appropriate and evaluate response

## 2024-12-21 NOTE — PROGRESS NOTES
[facility]  INPATIENT PHYSICAL THERAPY  DISCHARGE NOTE  [facility] - [id number]      Discharge Recommendations: Home with Outpatient PT  Equipment Recommendations: Yes (Continue to assess pending progress (Patient has rollator will likely need RW))               Time In: 703  Time Out: 730  Timed Code Treatment Minutes: 27 Minutes  Minutes: 27          Date: 2024  Patient Name: Duane Scheiderer,  Gender:  male        MRN: [id number]  : 1950  (74 y.o.)     Referring Practitioner: Xander Phan MD  Diagnosis: Periprosthetic fracture of shaft of femur  Additional Pertinent Hx: 74 y.o. 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 2024: right total hip arthroplasty. His function was improving well postoperatively.  He began using straight cane to assist walking about 2 weeks ago. On 2024, the patient suddenly developed severe pain at the right hip radiating to right thigh to knee level when he tried to stand up to walk.  He then began having increasing difficulty to put weight on his right lower extremity to walk. On 2024 morning, while the patient went to bathroom to have bowel movement, fell forward to the ground landing on his knees with his upper body  with home mobility GOAL MET, SEE LTG  Short Term Goal 4: Patient to ascend/descend 4 steps with 2 hand rails with BUE support and Minimal assistance in order to assist with home mobility. GOAL MET, SEE LTG                          Long Term Goals  Time Frame for Long Term Goals : 2 weeks from IPR evaluation  Long Term Goal 1: Patient to perform sit<>stand transfers with Mod I while maintaining TTWB on RLE in order to assist with safety with transfers in home. GOAL MET  Long Term Goal 2: Patient to ambulate >/=75 feet with use of RW with modified independence while maintaining TTWB on RLE in order to assist with home mobility. GOAL MET  Long Term Goal 3: Patient to ascend/descend 4 steps with 1 HR and Supervision in order to assist with home entry. GOAL MET  Long Term Goal 4: Patient to perform car transfers with modified independence in order to assist with getting to and from appointments. GOAL MET                              Following session, patient left in safe position with all fall risk precautions in place.

## 2024-12-21 NOTE — PLAN OF CARE
Problem: Chronic Conditions and Co-morbidities  Goal: Patient's chronic conditions and co-morbidity symptoms are monitored and maintained or improved  Outcome: Progressing  Flowsheets (Taken 12/20/2024 2044)  Care Plan - Patient's Chronic Conditions and Co-Morbidity Symptoms are Monitored and Maintained or Improved: Monitor and assess patient's chronic conditions and comorbid symptoms for stability, deterioration, or improvement     Problem: Discharge Planning  Goal: Discharge to home or other facility with appropriate resources  Outcome: Progressing  Flowsheets (Taken 12/20/2024 2044)  Discharge to home or other facility with appropriate resources: Identify barriers to discharge with patient and caregiver     Problem: ABCDS Injury Assessment  Goal: Absence of physical injury  Outcome: Progressing     Problem: Safety - Adult  Goal: Free from fall injury  Outcome: Progressing     Problem: Pain  Goal: Verbalizes/displays adequate comfort level or baseline comfort level  Outcome: Progressing  Flowsheets (Taken 12/20/2024 2115)  Verbalizes/displays adequate comfort level or baseline comfort level: Encourage patient to monitor pain and request assistance

## 2024-12-21 NOTE — DISCHARGE SUMMARY
Physical Medicine & Rehabilitation   Discharge Summary     Patient Identification:  Duane Scheiderer  : 1950  Admit date: 12/10/2024  Discharge date: 2024  Attending provider: Xander Phan MD        Primary care provider: Katy Chaudhary DO     Discharge Diagnoses:   Status post fall on 2024 resulting  Proximal right femur nondisplaced oblique periprosthetic fracture  Right lateral lower chest wall and abdomen contusion resulting right 7th & 8th ribs nondisplaced fractures  Status post right total hip arthroplasty surgery on 2024 for severe osteoarthritis  Diabetes mellitus of type II with hyperglycemia  History of thymoma with myasthenia gravis requiring thymectomy  Sleep apnea  Hypertension  Hyperlipidemia  Class I obesity with BMI of 30.56  Benign prostate hypertrophy  History of left total hip arthroplasty surgery  History of left knee total knee arthroplasty surgery  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  History of 2 cervical spine and 4 lumbar spine surgeries  History of status post infusion port placement        Discharge Functional Status:    Physical therapy:  Bed Mobility:  Rolling to Left: Modified Independent   Rolling to Right: Modified Independent   Supine to Sit: Modified Independent  Sit to Supine: Modified Independent      Transfers:  Sit to Stand: Modified Independent  Stand to Sit:Modified Independent  To/From Bed and Chair: Modified Independent  Car: pt deferred and stated he felt comfortable getting in/out of the car   *transfers performed from EOB     Ambulation:  Modified Independent  Distance: 150' x 1, 50' x 1, 20' x 2, up/down 5' ramp, multiple other distances throughout rehab gym  Surface: Level Tile and Ramp  Device: Rolling Walker  Gait Deviations: Forward Flexed Posture, Decreased Weight Shift Right, Decreased Gait Speed, and Increased reliance on assistive device     Modified

## 2024-12-21 NOTE — PROGRESS NOTES
Patient discharged in stable condition as per order of attending physician to Home per staff at Time: 1050 and Via Wheelchair to car.    AVS provided by LPN at time of discharge, which includes all necessary medical information pertaining to the patients current course of illness, treatment, medications, post-discharge goals of care, and treatment preferences.     Patient/ family verbalize understanding of discharge plan and are in agreement with goal/plan/treatment preferences.     Belongings including  , cell phone, personal care items  sent with patient.      Home medications sent home with patient Yes    Availability of \"My Chart\" offered to patient as a tool for updated health record.  Steps for activation discussed with patient as mentioned on AVS.

## 2025-03-24 RX ORDER — CYANOCOBALAMIN (VITAMIN B-12) 1000 MCG
1 TABLET ORAL DAILY
Qty: 90 TABLET | Refills: 2 | OUTPATIENT
Start: 2025-03-24

## 2025-03-27 RX ORDER — METHOCARBAMOL 500 MG/1
500 TABLET, FILM COATED ORAL 3 TIMES DAILY PRN
Qty: 90 TABLET | Refills: 2 | OUTPATIENT
Start: 2025-03-27

## (undated) DEVICE — COVER XR CASS W21XL40IN UNIV ADH MICROSHIELD

## (undated) DEVICE — COVER ARMBRD W13XL28.5IN IMPERV BLU FOR OP RM

## (undated) DEVICE — GEL ULTRASOUND 20GM OVERWRAPPED NON STEN

## (undated) DEVICE — BLADE SAW  6 13X90X1.27MM

## (undated) DEVICE — PADDING CAST W6INXL4YD COT LO LINTING WYTEX

## (undated) DEVICE — PAD,NON-ADHERENT,3X8,STERILE,LF,1/PK: Brand: MEDLINE

## (undated) DEVICE — CLOSURE SKIN FLX NONINVASIVE PRELOC TECHNOLOGY FOR 24IN

## (undated) DEVICE — MIXER BNE CEM 120GM 3 MIX OPTVAC VAC SYS 417300] ZB

## (undated) DEVICE — SUTURE PDS II SZ 2-0 L27IN ABSRB VLT L26MM CT-2 1/2 CIR Z333H

## (undated) DEVICE — PATTERSON TOTAL JOINT: Brand: MEDLINE INDUSTRIES, INC.

## (undated) DEVICE — BANDAGE ADH W1XL3IN NAT FAB WVN FLX DURABLE N ADH PD SEAL

## (undated) DEVICE — 3M™ IOBAN™ 2 ANTIMICROBIAL INCISE DRAPE 6650EZ: Brand: IOBAN™ 2

## (undated) DEVICE — SKIN AFFIX SURG ADHESIVE 72/CS 0.55ML: Brand: MEDLINE

## (undated) DEVICE — GLOVE SURG SZ 75 L12IN FNGR THK94MIL TRNSLUC YEL LTX

## (undated) DEVICE — SOLUTION IV IRRIG WATER 1000ML POUR BRL 2F7114

## (undated) DEVICE — ELECTRODE BLDE L4IN NONINSULATED EDGE

## (undated) DEVICE — VORTEX VACUUM MIXING SYSTEM: Brand: VORTEX

## (undated) DEVICE — GOWN,SIRUS,NON REINFRCD,LARGE,SET IN SL: Brand: MEDLINE

## (undated) DEVICE — GLOVE ORANGE PI 7   MSG9070

## (undated) DEVICE — SPONGE GZ W4XL4IN COT 12 PLY TYP VII WVN C FLD DSGN

## (undated) DEVICE — SUTURE VCRL SZ 1 L36IN ABSRB UD CTX L48MM 1/2 CIR J977H

## (undated) DEVICE — PAD,ABDOMINAL,5"X9",ST,LF,25/BX: Brand: MEDLINE INDUSTRIES, INC.

## (undated) DEVICE — PAD HIP IMMOB

## (undated) DEVICE — GLOVE ORANGE PI 8   MSG9080

## (undated) DEVICE — Device

## (undated) DEVICE — SYRINGE MED 10ML LUERLOCK TIP W/O SFTY DISP

## (undated) DEVICE — GLOVE ORANGE PI 8 1/2   MSG9085

## (undated) DEVICE — TRAY EPI 25GA L3.5IN 0.75% BIPIVCAIN 8.25% D CONTAIN BPA

## (undated) DEVICE — SPLINT KNEE UNIV FOR LESS THAN 36IN L24IN FOAM LAM E CNTCT

## (undated) DEVICE — 3M™ WARMING BLANKET, UPPER BODY, 10 PER CASE, 42268: Brand: BAIR HUGGER™

## (undated) DEVICE — DUAL CUT SAGITTAL BLADE

## (undated) DEVICE — 450 ML BOTTLE OF 0.05% CHLORHEXIDINE GLUCONATE IN 99.95% STERILE WATER FOR IRRIGATION, USP AND APPLICATOR.: Brand: IRRISEPT ANTIMICROBIAL WOUND LAVAGE

## (undated) DEVICE — BASIC SINGLE BASIN BTC-LF: Brand: MEDLINE INDUSTRIES, INC.

## (undated) DEVICE — 35 ML SYRINGE LUER-LOCK TIP: Brand: MONOJECT

## (undated) DEVICE — GLOVE ORANGE PI 7 1/2   MSG9075

## (undated) DEVICE — IMPREGNATED GAUZE DRESSING: Brand: CUTICERIN 7.5X20CM CTN 50

## (undated) DEVICE — DRAPE C ARM W36XL30IN RECTANG BND BG AND TAPE

## (undated) DEVICE — SUTURE VICRYL + SZ 1-0 L36IN ABSRB UD CTX 1/2 CIR TAPR PNT VCP977H

## (undated) DEVICE — CONTAINER,SPECIMEN,PNEU TUBE,4OZ,OR STRL: Brand: MEDLINE

## (undated) DEVICE — SUTURE VICRYL + SZ 2-0 L27IN ABSRB UD CP-1 1/2 CIR REV CUT VCP266H

## (undated) DEVICE — PACK-MAJOR

## (undated) DEVICE — GLOVE SURG SZ 65 THK91MIL LTX FREE SYN POLYISOPRENE

## (undated) DEVICE — DRAPE,EXTREMITY,89X128,STERILE: Brand: MEDLINE

## (undated) DEVICE — CHLORAPREP 26ML ORANGE

## (undated) DEVICE — SOLUTION IV 1000ML 0.9% SOD CHL PH 5 INJ USP VIAFLX PLAS

## (undated) DEVICE — SUTURE STRATAFIX SPRL SZ 1 L14IN ABSRB VLT L48CM CTX 1/2 SXPD2B405

## (undated) DEVICE — SUTURE VCRL SZ 2-0 L27IN ABSRB UD L36MM CP-1 1/2 CIR REV J266H

## (undated) DEVICE — CLIP ON PATIENT PROTECTIVE PADS FOR USE WITH THE ULP (6 PER CASE): Brand: CLIP-ON™ PATIENT PROTECTIVE PADS

## (undated) DEVICE — 3M™ TRANSPORE™ WHITE SURGICAL TAPE 1534-2, 2 INCH X 10 YARD (5CM X 9,1M), 6 ROLLS/CARTON 10 CARTONS/CASE: Brand: 3M™ TRANSPORE™

## (undated) DEVICE — SOLUTION IV 100ML 0.9% SOD CHL PLAS CONT USP VIAFLX 1 PER

## (undated) DEVICE — SUTURE PDS II SZ 1 L36IN ABSRB VLT L48MM CTX 1/2 CIR Z371T

## (undated) DEVICE — DRAPE,HIP,W/POUCHES,STERILE: Brand: MEDLINE

## (undated) DEVICE — DRESSING PETRO W3XL3IN OIL EMUL N ADH GZ KNIT IMPREG CELOS

## (undated) DEVICE — SPONGE GZ W4XL4IN COT 12 PLY TYP VII WVN C FLD DSGN STERILE

## (undated) DEVICE — DRESSING PETRO W3XL8IN OIL EMUL N ADH GZ KNIT IMPREG CELOS

## (undated) DEVICE — TETRA-FLEX CF WOVEN LATEX FREE ELASTIC BANDAGE 6" X 5.5 YD: Brand: TETRA-FLEX™CF

## (undated) DEVICE — SMITH & NEPHEW CEM MIX BOWL                                    W/SPATULA DISPOSABLE

## (undated) DEVICE — Z DISCONTINUED USE 2717541 SUTURE STRATAFIX SZ 3-0 L30CM NONABSORBABLE UD L26MM FS 3/8

## (undated) DEVICE — PADDING,UNDERCAST,COTTON, 4"X4YD STERILE: Brand: MEDLINE

## (undated) DEVICE — BRACE KNEE AD ALUM FOAM FULL UNIV HNG STRP CLSR ADJ X-ACT

## (undated) DEVICE — ROYAL SILK SURGICAL GOWN, XXL: Brand: CONVERTORS

## (undated) DEVICE — BANDAGE COMPR M W6INXL10YD WHT BGE VELC E MTRX HK AND LOOP

## (undated) DEVICE — SUTURE FIBERWIRE SZ 2 W/ TAPERED NEEDLE BLUE L38IN NONABSORB BLU L26.5MM 1/2 CIRCLE AR7200

## (undated) DEVICE — FOAM BUMP ROUND LARGE: Brand: MEDLINE INDUSTRIES, INC.

## (undated) DEVICE — SUTURE VICRYL SZ 1 L36IN ABSRB UD CTX L48MM 1/2 CIR J977H

## (undated) DEVICE — SUTURE ABSORBABLE MONOFILAMENT 1 CTX 36 CM 48 MM VIO PDS +

## (undated) DEVICE — DRESSING TRNSPAR W8XL12IN FLM SURESITE 123

## (undated) DEVICE — 3M™ COBAN™ NL STERILE NON-LATEX SELF-ADHERENT WRAP, 2084S, 4 IN X 5 YD (10 CM X 4,5 M), 18 ROLLS/CASE: Brand: 3M™ COBAN™

## (undated) DEVICE — NEEDLE ECHOGENIC 22GA L2IN INSUL W/ 30DEG BVL EXTN SET

## (undated) DEVICE — GLOVE SURG SZ 7.5 L11.73IN FNGR THK9.8MIL STRW LTX POLYMER